# Patient Record
Sex: MALE | Race: WHITE | Employment: OTHER | URBAN - METROPOLITAN AREA
[De-identification: names, ages, dates, MRNs, and addresses within clinical notes are randomized per-mention and may not be internally consistent; named-entity substitution may affect disease eponyms.]

---

## 2019-09-27 ENCOUNTER — OFFICE VISIT (OUTPATIENT)
Dept: NEUROLOGY | Age: 69
End: 2019-09-27

## 2019-09-27 VITALS
RESPIRATION RATE: 12 BRPM | DIASTOLIC BLOOD PRESSURE: 60 MMHG | OXYGEN SATURATION: 97 % | HEART RATE: 105 BPM | HEIGHT: 65 IN | SYSTOLIC BLOOD PRESSURE: 134 MMHG | WEIGHT: 177 LBS | BODY MASS INDEX: 29.49 KG/M2

## 2019-09-27 DIAGNOSIS — G70.00 OCULAR MYASTHENIA GRAVIS (HCC): Primary | ICD-10-CM

## 2019-09-27 DIAGNOSIS — H53.2 DIPLOPIA: ICD-10-CM

## 2019-09-27 RX ORDER — PYRIDOSTIGMINE BROMIDE 60 MG/1
TABLET ORAL
Qty: 135 TAB | Refills: 2 | Status: SHIPPED | OUTPATIENT
Start: 2019-09-27 | End: 2019-12-13 | Stop reason: SDUPTHER

## 2019-09-27 NOTE — PROGRESS NOTES
Chief Complaint   Patient presents with    Neurologic Problem     Double vision       Referred by: Dr. Hannah Cortez      HPI    Mr. Mahnaz Bruno is a 56-year-old gentleman with diabetes who is here referred by ophthalmology for double vision. I reviewed the medical records and he has been having intermittent double vision for about 6 months and left eye drooping. He has a history of prostate cancer in remission. Ophthalmology did a ice pack test with immediate reversal of symptoms consistent with myasthenia. Blood work ordered and is pending. MRI pending. He denies any shortness of breath or difficulty swallowing. No weakness in the extremities. 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 Review of Systems   Eyes: Positive for double vision. Respiratory: Negative for shortness of breath. Neurological: Negative for weakness. All other systems reviewed and are negative.       Past Medical History:   Diagnosis Date    Anxiety disorder     Cancer ([facility] [address]     PROSTATE; BCCA    Diabetes ([facility] [address]     GERD (gastroesophageal reflux disease)     Hypertension     Neurological disorder      Family History   Problem Relation Age of Onset    Cancer Brother         LUNG; THROAT    Anesth Problems Neg Hx      Social History     Socioeconomic History    Marital status:      Spouse name: Not on file    Number of children: Not on file    Years of education: Not on file    Highest education level: Not on file   Occupational History    Not on file   Social Needs    Financial resource strain: Not on file    Food insecurity:     Worry: Not on file     Inability: Not on file    Transportation needs:     Medical: Not on file     Non-medical: Not on file   Tobacco Use    Smoking status: Never Smoker    Smokeless tobacco: Never Used   Substance and Sexual Activity    Alcohol use: Yes     Comment: RARE    Drug use: No    Sexual activity: Yes     Partners: Female   Lifestyle    Physical activity:     Days per week: Not on file     Minutes per session: Not on file    Stress: Not on file   Relationships    Social connections:     Talks on phone: Not on file     Gets together: Not on file     Attends Anabaptism service: Not on file     Active member of club or organization: Not on file     Attends meetings of clubs or organizations: Not on file     Relationship status: Not on file    Intimate partner violence:     Fear of current or ex partner: Not on file     Emotionally abused: Not on file     Physically abused: Not on file     Forced sexual activity: Not on file   Other Topics Concern    Not on file   Social History Narrative    Not on file     Current Outpatient Medications   Medication Sig    pyridostigmine (MESTINON) 60 mg tablet 1/2 tab three times daily    omeprazole (PRILOSEC) 40 mg capsule Take 40 mg by mouth daily.  lisinopril (PRINIVIL, ZESTRIL) 20 mg tablet Take  by mouth daily.  atorvastatin (LIPITOR) 40 mg tablet Take  by mouth daily.  glipiZIDE (GLUCOTROL) 5 mg tablet Take  by mouth daily.  MULTIVITAMIN PO Take  by mouth daily.  HYDROcodone-acetaminophen (NORCO) 7.5-325 mg per tablet Take 1 Tab by mouth every six (6) hours as needed for Pain. Max Daily Amount: 4 Tabs.  ZOLPIDEM TARTRATE (AMBIEN PO) Take  by mouth as needed. No current facility-administered medications for this visit. Allergies   Allergen Reactions    Codeine Nausea Only         Neurologic Exam     Mental Status   Oriented to person, place, and time. Cranial Nerves   Cranial nerves II through XII intact. Left ptosis with decreased adduction left eye     Motor Exam   Muscle bulk: normal    Strength   Strength 5/5 throughout.      Sensory Exam   Light touch normal.     Gait, Coordination, and Reflexes     Gait  Gait: normal    Coordination   Romberg: negative  Finger to nose coordination: normal    Physical Exam   Constitutional: He is oriented to person, place, and time. He appears well-developed and well-nourished. Cardiovascular: Normal rate. Pulmonary/Chest: Effort normal.   Neurological: He is oriented to person, place, and time. He has normal strength. He has a normal Finger-Nose-Finger Test and a normal Romberg Test. Gait normal.   Skin: Skin is warm and dry. Psychiatric: His behavior is normal.   Vitals reviewed. Visit Vitals  /60   Pulse (!) 105   Resp 12   Ht 5' 5\" (1.651 m)   Wt 80.3 kg (177 lb)   SpO2 97%   BMI 29.45 kg/m²       Lab Results   Component Value Date/Time    WBC 17.1 (H) 03/10/2015 07:45 AM    HGB 10.6 (L) 03/11/2015 03:19 AM    HCT 31.9 (L) 03/11/2015 03:19 AM    PLATELET 197 96/13/0001 07:45 AM    MCV 90.5 03/10/2015 07:45 AM     Lab Results   Component Value Date/Time    Glucose 147 (H) 03/10/2015 07:45 AM    Glucose (POC) 129 (H) 03/11/2015 11:25 AM    Creatinine 1.20 (H) 03/10/2015 07:45 AM      No results found for: CHOL, CHOLPOCT, HDL, LDL, LDLC, LDLCPOC, LDLCEXT, TRIGL, TGLPOCT, CHHD, CHHDX  Lab Results   Component Value Date/Time    ALT (SGPT) 21 03/09/2015 01:38 PM    AST (SGOT) 13 (L) 03/09/2015 01:38 PM    Alk. phosphatase 43 (L) 03/09/2015 01:38 PM    Bilirubin, total 0.7 03/09/2015 01:38 PM    Albumin 3.5 03/10/2015 07:45 AM    Protein, total 6.0 (L) 03/09/2015 01:38 PM    INR 1.0 02/24/2015 10:59 AM    Prothrombin time 10.2 02/24/2015 10:59 AM    PLATELET 157 06/20/5412 07:45 AM        CT Results (maximum last 3): Results from [place] encounter on 01/23/15   CT ABD PELV W CONT    Narrative **Final Report**       ICD Codes / Adm. Diagnosis: 185   / Malignant neoplasm of prostate    Examination:  CT ABD PELVIS W CON  - [id number] - Jan 23 2015  2:45PM  Accession No:  [id number]  Reason:  Malignant neoplasm of prostate      REPORT:  INDICATION: Prostate cancer. COMPARISON: None.     TECHNIQUE:   Multislice helical CT was performed from the diaphragm to the symphysis   pubis during uneventful rapid bolus intravenous administration of 97 mL of   Isovue-370. . Oral contrast was also administered. Delayed images of the   abdomen were acquired. Contiguous 5 mm axial images were reconstructed and   lung and soft tissue windows were generated. Coronal reformations were   generated. FINDINGS:  The visualized portions of the lung bases are clear. There are no focal abnormalities within the spleen pancreas or adrenal   glands. There is a vague 4 mm low-density in the liver in segment 5 too   small to characterize but could represent tiny cyst. No renal masses are   identified. There is a 4 mm calcification lower pole left kidney. There is a   retroaortic left renal vein. There is an accessory right hepatic artery off of the SMA. .    The aorta tapers without aneurysm. There is no retroperitoneal adenopathy or   mass. The bowel is normal. The appendix is normal. There is no ascites or free   intraperitoneal air. There is no pelvic mass or adenopathy. IMPRESSION:   1. No evidence of metastatic disease. No adenopathy is identified. 2. 4 mm nonobstructing calculus lower pole left kidney. 3. There is a 4 mm low-density in segment 5 of the liver which is too small   to characterize but most likely a tiny cyst..              Signing/Reading Doctor: Portia Bello ([id number])    Approved: Portia Bello ([id number])  Jan 23 2015  3:44PM                                    MRI Results (maximum last 3): No results found for this or any previous visit. PET Results (maximum last 3): No results found for this or any previous visit. Assessment and Plan   Diagnoses and all orders for this visit:    1. Ocular myasthenia gravis ([facility] [address]  -     CT CHEST W WO CONT; Future    2. Diplopia    Other orders  -     pyridostigmine (MESTINON) 60 mg tablet; 1/2 tab three times daily      71-year-old gentleman with ocular myasthenia clinically who did respond to an ice pack test very appropriately. The remainder of his exam is benign. There may be some subtle right triceps weakness but nothing convincing to suggest generalized myasthenia today. We had a long conversation about the disease in general.  At this juncture because he is minimally symptomatic I will start Mestinon 30 mg 3 times daily for now to see if we can control the symptoms with this medication exclusively. He is diabetic and I would like to avoid steroids if possible but if he does start to show any generalization of symptoms such as weakness or shortness of breath or difficulty swallowing we are going to have to proceed with steroids immediately. I will await the MRI which is pending. CT chest ordered rule out thymoma. We will try to obtain the labs done last week. Questions answered in detail and I will see him at his next visit. I reviewed and decided to continue the current medications. This clinical note was dictated with an electronic dictation software that can make unintentional errors. If there are any questions, please contact me directly for clarification. A notice of this visit/encounter being completed has been sent electronically to the patient's PCP and/or referring provider.      [address] [place] [address], [facility] [address]  Diplomate ABPN

## 2019-09-27 NOTE — PATIENT INSTRUCTIONS
Computed Tomography (CT) Scan: About This Test 
What is it? A computed tomography (CT) scan uses X-rays to make detailed pictures of parts of your body and the structures inside your body. During the test, you will lie on a table that is attached to the [employer]. The CT scanner is a large doughnut-shaped machine. Why is this test done? Doctors use CT scans to study areas of the body, such as the brain, chest, or belly. CT scans are also used to assist or check on the success of a procedure or surgery. How can you prepare for the test? 
Talk to your doctor about all your health conditions before the test. For example, tell your doctor if: 
· You are or might be pregnant. · You are breastfeeding. · You have diabetes. · You take metformin. · You are allergic to any medicines. · You have had an X-ray test using barium contrast material in the past 4 days. · You get nervous in confined spaces. You may need medicine to help you relax. What happens before the test? 
· You may be asked to take off your jewelry. · You will take off all or most of your clothes and then change into a gown. · If you do leave some clothes on, make sure you take everything out of your pockets. · You may have contrast materials (dye) put into an IV in your arm. In some cases, you may have to drink a contrast material. Contrast material helps doctors see specific organs, blood vessels, and most tumors. What happens during the test? 
· You will lie on a table that is attached to the CT scanner. · The table slides into the round opening of the scanner. The table will move during the scan. The scanner moves within the doughnut-shaped casing around your body. · You will be asked to hold still during the scan. You may be asked to hold your breath for short periods.  
· You may be alone in the scanning room, but a technologist will be watching you through a window and talking with you during the test. 
 What else should you know about the test? 
· A CT scan does not hurt. · If a dye is used, you may feel a quick sting or pinch when the IV is started. The dye may make you feel warm and flushed and give you a metallic taste in your mouth. Some people feel sick to their stomach or get a headache. · If you breastfeed and are concerned about whether the dye used in this test is safe, talk to your doctor. Most experts believe that very little dye passes into breast milk and even less is passed on to the baby. But if you prefer, you can store some of your breast milk ahead of time and use it for a day or two after the test. 
· There is a small chance of getting cancer from some types of CT scans. The risk is higher in children, young adults, and people who have many radiation tests. If you are concerned about this risk, talk to your doctor about the benefits and risks of a CT scan and confirm that the test is needed. How long does the test take? The test will take about 30 to 60 minutes. Most of this time is spent getting ready for the scan. The actual test takes about 10 minutes. What happens after the test? 
· Depending on the reason for the test, you will probably be able to go home right away. · If contrast material was used, drink lots of liquids for 24 hours after the test unless your doctor tells you not to. Follow-up care is a key part of your treatment and safety. Be sure to make and go to all appointments, and call your doctor if you are having problems. It's also a good idea to keep a list of the medicines you take. Ask your doctor when you can expect to have your test results. Where can you learn more? Go to http://beatriz-lev.info/. Enter [id number] in the search box to learn more about \"Computed Tomography (CT) Scan: About This Test.\" Current as of: March 28, 2019 Content Version: 12.2 © 7369-4633 [employer].  Care instructions adapted under license by [address] (which disclaims liability or warranty for this information). If you have questions about a medical condition or this instruction, always ask your healthcare professional. [address] any warranty or liability for your use of this information.

## 2019-10-05 ENCOUNTER — HOSPITAL ENCOUNTER (OUTPATIENT)
Dept: MRI IMAGING | Age: 69
Discharge: HOME | End: 2019-10-05
Attending: OPHTHALMOLOGY
Payer: MEDICARE

## 2019-10-05 VITALS — WEIGHT: 170 LBS | BODY MASS INDEX: 28.29 KG/M2

## 2019-10-05 DIAGNOSIS — H53.2 DIPLOPIA: ICD-10-CM

## 2019-10-05 DIAGNOSIS — H02.402 UNSPECIFIED PTOSIS OF LEFT EYELID: ICD-10-CM

## 2019-10-05 PROCEDURE — 74011250636 HC RX REV CODE- 250/636

## 2019-10-05 PROCEDURE — A9575 INJ GADOTERATE MEGLUMI 0.1ML: HCPCS

## 2019-10-05 PROCEDURE — 70553 MRI BRAIN STEM W/O & W/DYE: CPT

## 2019-10-05 RX ORDER — GADOTERATE MEGLUMINE 376.9 MG/ML
15 INJECTION INTRAVENOUS
Status: COMPLETED | OUTPATIENT
Start: 2019-10-05 | End: 2019-10-05

## 2019-10-05 RX ADMIN — GADOTERATE MEGLUMINE 15 ML: 376.9 INJECTION INTRAVENOUS at 18:00

## 2019-10-07 ENCOUNTER — HOSPITAL ENCOUNTER (OUTPATIENT)
Dept: CT IMAGING | Age: 69
Discharge: HOME | End: 2019-10-07
Attending: PSYCHIATRY & NEUROLOGY
Payer: MEDICARE

## 2019-10-07 DIAGNOSIS — G70.00 OCULAR MYASTHENIA GRAVIS (HCC): ICD-10-CM

## 2019-10-07 LAB — CREAT BLD-MCNC: 0.9 MG/DL (ref 0.6–1.3)

## 2019-10-07 PROCEDURE — 82565 ASSAY OF CREATININE: CPT

## 2019-10-07 PROCEDURE — 74011636320 HC RX REV CODE- 636/320: Performed by: RADIOLOGY

## 2019-10-07 PROCEDURE — 71260 CT THORAX DX C+: CPT

## 2019-10-07 PROCEDURE — 74011000258 HC RX REV CODE- 258: Performed by: RADIOLOGY

## 2019-10-07 RX ORDER — SODIUM CHLORIDE 0.9 % (FLUSH) 0.9 %
10 SYRINGE (ML) INJECTION
Status: COMPLETED | OUTPATIENT
Start: 2019-10-07 | End: 2019-10-07

## 2019-10-07 RX ADMIN — SODIUM CHLORIDE 100 ML: 900 INJECTION, SOLUTION INTRAVENOUS at 14:22

## 2019-10-07 RX ADMIN — IOPAMIDOL 100 ML: 612 INJECTION, SOLUTION INTRAVENOUS at 14:22

## 2019-10-07 RX ADMIN — Medication 10 ML: at 14:22

## 2019-11-04 ENCOUNTER — TELEPHONE (OUTPATIENT)
Dept: NEUROLOGY | Age: 69
End: 2019-11-04

## 2019-11-04 NOTE — TELEPHONE ENCOUNTER
Pt's PCP. Dr. Martinez Friendly, calling to speak with Dr. Nadir Resendiz re pt. Pt is in the office now.  Please call back

## 2019-12-13 ENCOUNTER — OFFICE VISIT (OUTPATIENT)
Dept: NEUROLOGY | Age: 69
End: 2019-12-13

## 2019-12-13 VITALS
OXYGEN SATURATION: 95 % | SYSTOLIC BLOOD PRESSURE: 152 MMHG | HEIGHT: 65 IN | HEART RATE: 82 BPM | WEIGHT: 177 LBS | BODY MASS INDEX: 29.49 KG/M2 | RESPIRATION RATE: 14 BRPM | DIASTOLIC BLOOD PRESSURE: 80 MMHG

## 2019-12-13 DIAGNOSIS — G70.00 MYASTHENIA GRAVIS (HCC): Primary | ICD-10-CM

## 2019-12-13 RX ORDER — PREDNISONE 20 MG/1
40 TABLET ORAL
Qty: 180 TAB | Refills: 1 | Status: SHIPPED | OUTPATIENT
Start: 2019-12-13 | End: 2020-07-20

## 2019-12-13 RX ORDER — MYCOPHENOLATE MOFETIL 500 MG/1
500 TABLET ORAL 2 TIMES DAILY
Qty: 180 TAB | Refills: 1 | Status: SHIPPED | OUTPATIENT
Start: 2019-12-13 | End: 2020-12-07 | Stop reason: SDUPTHER

## 2019-12-13 RX ORDER — PYRIDOSTIGMINE BROMIDE 60 MG/1
TABLET ORAL
Qty: 135 TAB | Refills: 2 | Status: SHIPPED | OUTPATIENT
Start: 2019-12-13 | End: 2020-12-07 | Stop reason: SDUPTHER

## 2019-12-13 NOTE — PROGRESS NOTES
Chief Complaint   Patient presents with    Neurologic Problem     Myasthenia       HPI    Jeff Swan is a 56-year-old gentleman here to follow-up. He is antibody positive (B/B/B, anti-striatal positivity) myasthenia gravis here to follow-up. CT negative for thymoma. Since I saw him last he remains on Mestinon half tablet 3 times daily with minimal benefit short-lived. Intermittent GI symptoms. Today he is symptomatic with left ptosis and double vision. No weakness in extremities or shortness of breath. Review of Systems   Eyes: Positive for double vision. Respiratory: Negative for shortness of breath. Neurological: Negative for weakness. All other systems reviewed and are negative.       Past Medical History:   Diagnosis Date    Anxiety disorder     Cancer ([facility] [address]     PROSTATE; BCCA    Diabetes ([facility] [address]     GERD (gastroesophageal reflux disease)     Hypertension     Neurological disorder      Family History   Problem Relation Age of Onset    Cancer Brother         LUNG; THROAT    Anesth Problems Neg Hx      Social History     Socioeconomic History    Marital status:      Spouse name: Not on file    Number of children: Not on file    Years of education: Not on file    Highest education level: Not on file   Occupational History    Not on file   Social Needs    Financial resource strain: Not on file    Food insecurity:     Worry: Not on file     Inability: Not on file    Transportation needs:     Medical: Not on file     Non-medical: Not on file   Tobacco Use    Smoking status: Never Smoker    Smokeless tobacco: Never Used   Substance and Sexual Activity    Alcohol use: Yes     Comment: RARE    Drug use: No    Sexual activity: Yes     Partners: Female   Lifestyle    Physical activity:     Days per week: Not on file     Minutes per session: Not on file    Stress: Not on file   Relationships    Social connections:     Talks on phone: Not on file     Gets together: Not on file Attends Yazidism service: Not on file     Active member of club or organization: Not on file     Attends meetings of clubs or organizations: Not on file     Relationship status: Not on file    Intimate partner violence:     Fear of current or ex partner: Not on file     Emotionally abused: Not on file     Physically abused: Not on file     Forced sexual activity: Not on file   Other Topics Concern    Not on file   Social History Narrative    Not on file     Allergies   Allergen Reactions    Codeine Nausea Only         Current Outpatient Medications   Medication Sig    pyridostigmine (MESTINON) 60 mg tablet 1/2 tab three times daily    predniSONE (DELTASONE) 20 mg tablet Take 40 mg by mouth daily (with breakfast).  mycophenolate (CELLCEPT) 500 mg tablet Take 1 Tab by mouth two (2) times a day.  omeprazole (PRILOSEC) 40 mg capsule Take 40 mg by mouth daily.  lisinopril (PRINIVIL, ZESTRIL) 20 mg tablet Take  by mouth daily.  atorvastatin (LIPITOR) 40 mg tablet Take  by mouth daily.  glipiZIDE (GLUCOTROL) 5 mg tablet Take  by mouth daily.  MULTIVITAMIN PO Take  by mouth daily.  HYDROcodone-acetaminophen (NORCO) 7.5-325 mg per tablet Take 1 Tab by mouth every six (6) hours as needed for Pain. Max Daily Amount: 4 Tabs.  ZOLPIDEM TARTRATE (AMBIEN PO) Take  by mouth as needed. No current facility-administered medications for this visit. Neurologic Exam     Mental Status   WD/WN adult in NAD, normal grooming  VSS  A&O x 3    PERRL, nonicteric, left ptosis, right reduced adduction  Face is symmetric, tongue midline  Speech is fluent and clear  No limb ataxia. No abnl movements.   5/5 strength in the extremities  Moving all extemities spontaneously and symmetric  Normal gait    CVS RRR  Lungs nonlabored  Skin is warm and dry         Visit Vitals  /80   Pulse 82   Resp 14   Ht 5' 5\" (1.651 m)   Wt 80.3 kg (177 lb)   SpO2 95%   BMI 29.45 kg/m²       Assessment and Plan Diagnoses and all orders for this visit:    1. Myasthenia gravis (HCC)  -     mycophenolate (CELLCEPT) 500 mg tablet; Take 1 Tab by mouth two (2) times a day. Other orders  -     pyridostigmine (MESTINON) 60 mg tablet; 1/2 tab three times daily  -     predniSONE (DELTASONE) 20 mg tablet; Take 40 mg by mouth daily (with breakfast). 28-year-old gentleman with ocular myasthenia symptomatic currently despite Mestinon. We now have to add immune management and we will start with prednisone and bridge while we get CellCept approved and on board. He has no weakness in extremities which is reassuring. If he does develop any shortness of breath or weakness I need him to come to the emergency room. He is at risk for generalization of his condition. I will see him in 3 months or sooner if anything changes. We spoke about his disease in detail to include the diagnostic work-up and the current medications and plans moving forward. Careful management of his diabetes. I reviewed and decided to continue the current medications. This clinical note was dictated with an electronic dictation software that can make unintentional errors. If there are any questions, please contact me directly for clarification.       [address] [facility], [facility] [address]  Diplomate SUNITAN

## 2019-12-13 NOTE — LETTER
12/13/19 Patient: Roxanne Welch YOB: 1950 Date of Visit: 12/13/2019 Morris Torres MD 
1 [address] [address] VIA Facsimile: [phone] Dear Morris Torres MD, Thank you for referring Mr. Roxanne Welch to [address] [place] [address] for evaluation. My notes for this consultation are attached. If you have questions, please do not hesitate to call me. I look forward to following your patient along with you. Sincerely, [address] [place] [address], DO 
 
12/13/2019 Patient:  Roxanne Welch YOB: 1950 Date of Visit: 12/13/2019 Dear Morris Torres MD 
1 [address] [address] VIA Facsimile: [phone] Nicole James I was requested by Sosa Green MD to evaluate Mr. Roxanne Welch  for Chief Complaint Patient presents with  Neurologic Problem Myasthenia Devante Leader I am recommending the following:  
 
Diagnoses and all orders for this visit: 
 
1. Myasthenia gravis (HCC) 
-     mycophenolate (CELLCEPT) 500 mg tablet; Take 1 Tab by mouth two (2) times a day. Other orders -     pyridostigmine (MESTINON) 60 mg tablet; 1/2 tab three times daily -     predniSONE (DELTASONE) 20 mg tablet; Take 40 mg by mouth daily (with breakfast). ---------------------------------------------------------------------------------------------------------------------- Below is my encounter: Chief Complaint Patient presents with  Neurologic Problem Myasthenia KEYSHA Lopez is a 52-year-old gentleman here to follow-up. He is antibody positive (B/B/B, anti-striatal positivity) myasthenia gravis here to follow-up. CT negative for thymoma. Since I saw him last he remains on Mestinon half tablet 3 times daily with minimal benefit short-lived. Intermittent GI symptoms.   Today he is symptomatic with left ptosis and double vision. No weakness in extremities or shortness of breath. Review of Systems Eyes: Positive for double vision. Respiratory: Negative for shortness of breath. Neurological: Negative for weakness. All other systems reviewed and are negative. Past Medical History:  
Diagnosis Date  Anxiety disorder  Cancer ([facility] PROSTATE; BCCA  Diabetes ([facility]  GERD (gastroesophageal reflux disease)  Hypertension  Neurological disorder Family History Problem Relation Age of Onset  Cancer Brother LUNG; THROAT  Anesth Problems Neg Hx Social History Socioeconomic History  Marital status:  Spouse name: Not on file  Number of children: Not on file  Years of education: Not on file  Highest education level: Not on file Occupational History  Not on file Social Needs  Financial resource strain: Not on file  Food insecurity:  
  Worry: Not on file Inability: Not on file  Transportation needs:  
  Medical: Not on file Non-medical: Not on file Tobacco Use  Smoking status: Never Smoker  Smokeless tobacco: Never Used Substance and Sexual Activity  Alcohol use: Yes Comment: RARE  Drug use: No  
 Sexual activity: Yes  
  Partners: Female Lifestyle  Physical activity:  
  Days per week: Not on file Minutes per session: Not on file  Stress: Not on file Relationships  Social connections:  
  Talks on phone: Not on file Gets together: Not on file Attends Jewish service: Not on file Active member of club or organization: Not on file Attends meetings of clubs or organizations: Not on file Relationship status: Not on file  Intimate partner violence:  
  Fear of current or ex partner: Not on file Emotionally abused: Not on file Physically abused: Not on file Forced sexual activity: Not on file Other Topics Concern  Not on file Social History Narrative  Not on file Allergies Allergen Reactions  Codeine Nausea Only Current Outpatient Medications Medication Sig  pyridostigmine (MESTINON) 60 mg tablet 1/2 tab three times daily  predniSONE (DELTASONE) 20 mg tablet Take 40 mg by mouth daily (with breakfast).  mycophenolate (CELLCEPT) 500 mg tablet Take 1 Tab by mouth two (2) times a day.  omeprazole (PRILOSEC) 40 mg capsule Take 40 mg by mouth daily.  lisinopril (PRINIVIL, ZESTRIL) 20 mg tablet Take  by mouth daily.  atorvastatin (LIPITOR) 40 mg tablet Take  by mouth daily.  glipiZIDE (GLUCOTROL) 5 mg tablet Take  by mouth daily.  MULTIVITAMIN PO Take  by mouth daily.  HYDROcodone-acetaminophen (NORCO) 7.5-325 mg per tablet Take 1 Tab by mouth every six (6) hours as needed for Pain. Max Daily Amount: 4 Tabs.  ZOLPIDEM TARTRATE (AMBIEN PO) Take  by mouth as needed. No current facility-administered medications for this visit. Neurologic Exam  
 
Mental Status WD/WN adult in NAD, normal grooming VSS 
A&O x 3 PERRL, nonicteric, left ptosis, right reduced adduction Face is symmetric, tongue midline Speech is fluent and clear No limb ataxia. No abnl movements. 5/5 strength in the extremities Moving all extemities spontaneously and symmetric Normal gait CVS RRR Lungs nonlabored Skin is warm and dry Visit Vitals /80 Pulse 82 Resp 14 Ht 5' 5\" (1.651 m) Wt 80.3 kg (177 lb) SpO2 95% BMI 29.45 kg/m² Assessment and Plan Diagnoses and all orders for this visit: 
 
1. Myasthenia gravis (HCC) 
-     mycophenolate (CELLCEPT) 500 mg tablet; Take 1 Tab by mouth two (2) times a day. Other orders -     pyridostigmine (MESTINON) 60 mg tablet; 1/2 tab three times daily -     predniSONE (DELTASONE) 20 mg tablet; Take 40 mg by mouth daily (with breakfast).  
 
 
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 We now have to add immune management and we will start with prednisone and bridge while we get CellCept approved and on board. He has no weakness in extremities which is reassuring. If he does develop any shortness of breath or weakness I need him to come to the emergency room. He is at risk for generalization of his condition. I will see him in 3 months or sooner if anything changes. We spoke about his disease in detail to include the diagnostic work-up and the current medications and plans moving forward. Careful management of his diabetes. I reviewed and decided to continue the current medications. This clinical note was dictated with an electronic dictation software that can make unintentional errors. If there are any questions, please contact me directly for clarification. Thank you for giving me the opportunity to assist in the care of Mr. Jessica Barron. If you have questions, please do not hesitate to contact me. Sincerely, 2 [facility], DO Neurologist 
Brain Injury Medicine Diplomate STEFANIA

## 2020-07-06 ENCOUNTER — TELEPHONE (OUTPATIENT)
Dept: NEUROLOGY | Age: 70
End: 2020-07-06

## 2020-07-06 NOTE — TELEPHONE ENCOUNTER
----- Message from Jacky Dada Rhoda sent at 7/6/2020 12:58 PM EDT -----  Regarding: dr anderson/ refill  Medication Refill    Caller (if not patient): Jackson Huerta      Relationship of caller (if not patient): spouse      Best contact number(s): [phone]      Name of medication and dosage if known: prednisone 20mg tablets      Is patient out of this medication (yes/no): yes      Pharmacy name: [facility]     Pharmacy listed in chart? (yes/no): yes  Pharmacy phone number: [phone]      Details to clarify the request:      Jacky LuxHellen

## 2020-07-07 NOTE — TELEPHONE ENCOUNTER
Yes, he should remain on that medication until he is seen in the office. If he would like a sooner appointment he can see the nurse practitioner in advance of September.

## 2020-07-20 ENCOUNTER — OFFICE VISIT (OUTPATIENT)
Dept: URGENT CARE | Age: 70
End: 2020-07-20

## 2020-07-20 VITALS — OXYGEN SATURATION: 94 % | RESPIRATION RATE: 20 BRPM | HEART RATE: 78 BPM | TEMPERATURE: 98.3 F

## 2020-07-20 DIAGNOSIS — Z20.822 SUSPECTED COVID-19 VIRUS INFECTION: Primary | ICD-10-CM

## 2020-07-20 RX ORDER — PREDNISONE 20 MG/1
TABLET ORAL
Qty: 180 TAB | Refills: 1 | Status: SHIPPED | OUTPATIENT
Start: 2020-07-20 | End: 2020-12-07 | Stop reason: SDUPTHER

## 2020-07-20 NOTE — PROGRESS NOTES
This patient was seen in [facility] at [address] [place] [address] Urgent Care while in their vehicle due to COVID-19 pandemic with PPE and focused examination in order to decrease community viral transmission. The patient/guardian gave verbal consent to treat. The history is provided by the patient.       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    Past Medical History:   Diagnosis Date    Anxiety disorder     Cancer ([facility]     PROSTATE; BCCA    Diabetes ([facility]     GERD (gastroesophageal reflux disease)     Hypertension     Neurological disorder         Past Surgical History:   Procedure Laterality Date    HX OTHER SURGICAL      Horse Accident-surgery to face for fx    HX OTHER SURGICAL Right     Ear Surgery post facial surgery for scar tissue removal    HX UROLOGICAL           Family History   Problem Relation Age of Onset    Cancer Brother         LUNG; THROAT    Anesth Problems Neg Hx         Social History     Socioeconomic History    Marital status:      Spouse name: Not on file    Number of children: Not on file    Years of education: Not on file    Highest education level: Not on file   Occupational History    Not on file   Social Needs    Financial resource strain: Not on file    Food insecurity     Worry: Not on file     Inability: Not on file    Transportation needs     Medical: Not on file     Non-medical: Not on file   Tobacco Use    Smoking status: Never Smoker    Smokeless tobacco: Never Used   Substance and Sexual Activity    Alcohol use: Yes     Comment: RARE    Drug use: No    Sexual activity: Yes     Partners: Female   Lifestyle    Physical activity     Days per week: Not on file     Minutes per session: Not on file    Stress: Not on file   Relationships    Social connections     Talks on phone: Not on file     Gets together: Not on file     Attends Mormon service: Not on file     Active member of club or organization: Not on file Attends meetings of clubs or organizations: Not on file     Relationship status: Not on file    Intimate partner violence     Fear of current or ex partner: Not on file     Emotionally abused: Not on file     Physically abused: Not on file     Forced sexual activity: Not on file   Other Topics Concern    Not on file   Social History Narrative    Not on file                ALLERGIES: Codeine    Review of Systems   All other systems reviewed and are negative. Vitals:    07/20/20 1356   Pulse: 78   Resp: 20   Temp: 98.3 °F (36.8 °C)   SpO2: 94%       Physical Exam  Vitals signs and nursing note reviewed. Constitutional:       General: He is not in acute distress. Appearance: He is not ill-appearing. Pulmonary:      Effort: Pulmonary effort is normal. No respiratory distress. Breath sounds: Normal breath sounds. MDM    Procedures      ICD-10-CM ICD-9-CM    1. Suspected COVID-19 virus infection  Z20.828 V01.79 NOVEL CORONAVIRUS (COVID-19)      Tested for Covid-19 and advised to quarantine until result comes back. Use Mucinex DM twice a day    No orders of the defined types were placed in this encounter. No results found for any visits on 07/20/20. The patients condition was discussed with the patient and they understand. The patient is to follow up with primary care doctor. If signs and symptoms become worse the pt is to go to the ER. The patient is to take medications as prescribed.

## 2020-07-24 LAB — SARS-COV-2, NAA: NOT DETECTED

## 2020-10-09 ENCOUNTER — TRANSCRIBE ORDER (OUTPATIENT)
Dept: GENERAL RADIOLOGY | Age: 70
End: 2020-10-09

## 2020-10-09 ENCOUNTER — HOSPITAL ENCOUNTER (OUTPATIENT)
Dept: GENERAL RADIOLOGY | Age: 70
Discharge: HOME | End: 2020-10-09
Payer: MEDICARE

## 2020-10-09 DIAGNOSIS — M54.2 NECK PAIN: Primary | ICD-10-CM

## 2020-10-09 DIAGNOSIS — M25.512 LEFT ANTERIOR SHOULDER PAIN: ICD-10-CM

## 2020-10-09 DIAGNOSIS — M54.2 NECK PAIN: ICD-10-CM

## 2020-10-09 DIAGNOSIS — M54.50 ACUTE RIGHT-SIDED LOW BACK PAIN WITHOUT SCIATICA: ICD-10-CM

## 2020-10-09 DIAGNOSIS — M25.512 LEFT ANTERIOR SHOULDER PAIN: Primary | ICD-10-CM

## 2020-10-09 PROCEDURE — 73030 X-RAY EXAM OF SHOULDER: CPT | Performed by: NURSE PRACTITIONER

## 2020-10-09 PROCEDURE — 72100 X-RAY EXAM L-S SPINE 2/3 VWS: CPT | Performed by: NURSE PRACTITIONER

## 2020-10-09 PROCEDURE — 72050 X-RAY EXAM NECK SPINE 4/5VWS: CPT | Performed by: NURSE PRACTITIONER

## 2020-12-07 ENCOUNTER — OFFICE VISIT (OUTPATIENT)
Dept: NEUROLOGY | Age: 70
End: 2020-12-07
Payer: MEDICARE

## 2020-12-07 VITALS
HEART RATE: 88 BPM | BODY MASS INDEX: 29.16 KG/M2 | WEIGHT: 175 LBS | SYSTOLIC BLOOD PRESSURE: 132 MMHG | HEIGHT: 65 IN | DIASTOLIC BLOOD PRESSURE: 84 MMHG | OXYGEN SATURATION: 98 %

## 2020-12-07 DIAGNOSIS — G70.00 MYASTHENIA GRAVIS (HCC): ICD-10-CM

## 2020-12-07 DIAGNOSIS — G70.00 OCULAR MYASTHENIA GRAVIS (HCC): Primary | ICD-10-CM

## 2020-12-07 PROCEDURE — 3017F COLORECTAL CA SCREEN DOC REV: CPT | Performed by: PSYCHIATRY & NEUROLOGY

## 2020-12-07 PROCEDURE — 1101F PT FALLS ASSESS-DOCD LE1/YR: CPT | Performed by: PSYCHIATRY & NEUROLOGY

## 2020-12-07 PROCEDURE — G8510 SCR DEP NEG, NO PLAN REQD: HCPCS | Performed by: PSYCHIATRY & NEUROLOGY

## 2020-12-07 PROCEDURE — G8427 DOCREV CUR MEDS BY ELIG CLIN: HCPCS | Performed by: PSYCHIATRY & NEUROLOGY

## 2020-12-07 PROCEDURE — G8419 CALC BMI OUT NRM PARAM NOF/U: HCPCS | Performed by: PSYCHIATRY & NEUROLOGY

## 2020-12-07 PROCEDURE — 99214 OFFICE O/P EST MOD 30 MIN: CPT | Performed by: PSYCHIATRY & NEUROLOGY

## 2020-12-07 PROCEDURE — G8536 NO DOC ELDER MAL SCRN: HCPCS | Performed by: PSYCHIATRY & NEUROLOGY

## 2020-12-07 RX ORDER — MYCOPHENOLATE MOFETIL 500 MG/1
500 TABLET ORAL 2 TIMES DAILY
Qty: 180 TAB | Refills: 2 | Status: SHIPPED | OUTPATIENT
Start: 2020-12-07 | End: 2021-04-21 | Stop reason: SDUPTHER

## 2020-12-07 RX ORDER — PREDNISONE 20 MG/1
TABLET ORAL
Qty: 180 TAB | Refills: 1 | Status: SHIPPED | OUTPATIENT
Start: 2020-12-07 | End: 2021-04-21

## 2020-12-07 RX ORDER — PYRIDOSTIGMINE BROMIDE 60 MG/1
TABLET ORAL
Qty: 135 TAB | Refills: 2 | Status: SHIPPED | OUTPATIENT
Start: 2020-12-07 | End: 2021-04-21 | Stop reason: SDUPTHER

## 2020-12-07 NOTE — PROGRESS NOTES
Chief Complaint   Patient presents with    Neurologic Problem     Myasthenia Gravis, follow up     Visit Vitals  /84 (BP 1 Location: Left arm, BP Patient Position: Sitting)   Pulse 88   Ht 5' 5\" (1.651 m)   Wt 175 lb (79.4 kg)   SpO2 98%   BMI 29.12 kg/m²

## 2020-12-07 NOTE — PROGRESS NOTES
Chief Complaint   Patient presents with    Neurologic Problem     Myasthenia Gravis, follow up       HPI        Rosie Quiñones is a 80-year-old gentleman here to follow-up. He is antibody positive (B/B/B, anti-striatal positivity) myasthenia gravis here to follow-up. CT negative for thymoma. Since I saw him last he remains on Mestinon half tablet 3 times daily with minimal benefit short-lived. Intermittent GI symptoms. Additionally he has run out of his medication both prednisone and CellCept and has become symptomatic. Left eye ptosis with diplopia. No shortness of breath or weakness. Review of Systems   Eyes: Positive for double vision. Respiratory: Negative for shortness of breath. All other systems reviewed and are negative.       Past Medical History:   Diagnosis Date    Anxiety disorder     Cancer ([facility]     PROSTATE; BCCA    Diabetes ([facility]     GERD (gastroesophageal reflux disease)     Hypertension     Neurological disorder      Family History   Problem Relation Age of Onset    Cancer Brother         LUNG; THROAT    Anesth Problems Neg Hx      Social History     Socioeconomic History    Marital status:      Spouse name: Not on file    Number of children: Not on file    Years of education: Not on file    Highest education level: Not on file   Occupational History    Not on file   Social Needs    Financial resource strain: Not on file    Food insecurity     Worry: Not on file     Inability: Not on file   [employer] needs     Medical: Not on file     Non-medical: Not on file   Tobacco Use    Smoking status: Never Smoker    Smokeless tobacco: Never Used   Substance and Sexual Activity    Alcohol use: Yes     Comment: RARE    Drug use: No    Sexual activity: Yes     Partners: Female   Lifestyle    Physical activity     Days per week: Not on file     Minutes per session: Not on file    Stress: Not on file   Relationships    Social connections     Talks on phone: Not on file     Gets together: Not on file     Attends Lutheran service: Not on file     Active member of club or organization: Not on file     Attends meetings of clubs or organizations: Not on file     Relationship status: Not on file    Intimate partner violence     Fear of current or ex partner: Not on file     Emotionally abused: Not on file     Physically abused: Not on file     Forced sexual activity: Not on file   Other Topics Concern    Not on file   Social History Narrative    Not on file     Allergies   Allergen Reactions    Codeine Nausea Only         Current Outpatient Medications   Medication Sig    mycophenolate (CELLCEPT) 500 mg tablet Take 1 Tab by mouth two (2) times a day.  predniSONE (DELTASONE) 20 mg tablet 2 tablets daily for 90 days then reduce to 1 tablet daily and continue    pyridostigmine (MESTINON) 60 mg tablet 1/2 tab three times daily    omeprazole (PRILOSEC) 40 mg capsule Take 40 mg by mouth daily.  lisinopril (PRINIVIL, ZESTRIL) 20 mg tablet Take  by mouth daily.  atorvastatin (LIPITOR) 40 mg tablet Take  by mouth daily.  glipiZIDE (GLUCOTROL) 5 mg tablet Take  by mouth daily.  MULTIVITAMIN PO Take  by mouth daily.  HYDROcodone-acetaminophen (NORCO) 7.5-325 mg per tablet Take 1 Tab by mouth every six (6) hours as needed for Pain. Max Daily Amount: 4 Tabs.  ZOLPIDEM TARTRATE (AMBIEN PO) Take  by mouth as needed. No current facility-administered medications for this visit. Neurologic Exam     Mental Status   WD/WN adult in NAD, normal grooming  VSS  A&O x 3    PERRL, nonicteric, EOMI, left ptosis fluctuating during exam  Face is symmetric, tongue midline  Speech is fluent and clear  No limb ataxia. No abnl movements.   Moving all extemities spontaneously and symmetric  Normal gait    CVS RRR  Lungs nonlabored  Skin is warm and dry         Visit Vitals  /84 (BP 1 Location: Left arm, BP Patient Position: Sitting)   Pulse 88   Ht 5' 5\" (1.651 m) Wt 175 lb (79.4 kg)   SpO2 98%   BMI 29.12 kg/m²       Assessment and Plan   Diagnoses and all orders for this visit:    1. Ocular myasthenia gravis ([address]    2. Myasthenia gravis (HCC)  -     mycophenolate (CELLCEPT) 500 mg tablet; Take 1 Tab by mouth two (2) times a day. Other orders  -     predniSONE (DELTASONE) 20 mg tablet; 2 tablets daily for 90 days then reduce to 1 tablet daily and continue  -     pyridostigmine (MESTINON) 60 mg tablet; 1/2 tab three times daily      72year-old gentleman with myasthenia symptomatic today since he has been off of prednisone and CellCept. We will resume today 40 mg daily for 90 days then reduce to 20 mg daily and continue. He will start CellCept concomitantly. Continue Mestinon. ER precautions discussed, if he has any shortness of breath difficulty swallowing or weakness please come to the hospital immediately. I will see him in about 3-4 months. I reviewed and decided to continue the current medications. This clinical note was dictated with an electronic dictation software that can make unintentional errors. If there are any questions, please contact me directly for clarification.       2 [facility], [facility] [address]  Diplomate SUNITAN

## 2020-12-07 NOTE — LETTER
12/7/20 Patient: Tete Galaviz YOB: 1950 Date of Visit: 12/7/2020 Rebel Mazariegos MD 
1 [address] [address] VIA Facsimile: [phone] Dear Rebel Mazariegos MD, Thank you for referring Mr. Tete Galaviz to [address] [place] [address] for evaluation. My notes for this consultation are attached. If you have questions, please do not hesitate to call me. I look forward to following your patient along with you. Sincerely, 2 [facility], DO 
 
12/7/2020 Patient:  Tete Galaviz YOB: 1950 Date of Visit: 12/7/2020 Dear Rebel Mazariegos MD 
1 [address] [address] VIA Facsimile: [phone]: I was requested by Kaleb Garces MD to evaluate Mr. Tete Galaviz  for Chief Complaint Patient presents with  Neurologic Problem Myasthenia Gravis, follow up Dangelo Fong I am recommending the following:  
 
Diagnoses and all orders for this visit: 
 
1. Ocular myasthenia gravis ([address] 2. Myasthenia gravis (HCC) 
-     mycophenolate (CELLCEPT) 500 mg tablet; Take 1 Tab by mouth two (2) times a day. Other orders -     predniSONE (DELTASONE) 20 mg tablet; 2 tablets daily for 90 days then reduce to 1 tablet daily and continue -     pyridostigmine (MESTINON) 60 mg tablet; 1/2 tab three times daily 
 
 
 
---------------------------------------------------------------------------------------------------------------------- Below is my encounter: Chief Complaint Patient presents with  Neurologic Problem Myasthenia Gravis, follow up KEYSHA Qamar Lambert is a 27-year-old gentleman here to follow-up. He is antibody positive (B/B/B, anti-striatal positivity) myasthenia gravis here to follow-up. CT negative for thymoma. Since I saw him last he remains on Mestinon half tablet 3 times daily with minimal benefit short-lived. Intermittent GI symptoms. Additionally he has run out of his medication both prednisone and CellCept and has become symptomatic. Left eye ptosis with diplopia. No shortness of breath or weakness. Review of Systems Eyes: Positive for double vision. Respiratory: Negative for shortness of breath. All other systems reviewed and are negative. Past Medical History:  
Diagnosis Date  Anxiety disorder  [facility] PROSTATE; BCCA  Diabetes ([facility]  GERD (gastroesophageal reflux disease)  Hypertension  Neurological disorder Family History Problem Relation Age of Onset  Cancer Brother LUNG; THROAT  Anesth Problems Neg Hx Social History Socioeconomic History  Marital status:  Spouse name: Not on file  Number of children: Not on file  Years of education: Not on file  Highest education level: Not on file Occupational History  Not on file Social Needs  Financial resource strain: Not on file  Food insecurity Worry: Not on file Inability: Not on file  Transportation needs Medical: Not on file Non-medical: Not on file Tobacco Use  Smoking status: Never Smoker  Smokeless tobacco: Never Used Substance and Sexual Activity  Alcohol use: Yes Comment: RARE  Drug use: No  
 Sexual activity: Yes  
  Partners: Female Lifestyle  Physical activity Days per week: Not on file Minutes per session: Not on file  Stress: Not on file Relationships  Social connections Talks on phone: Not on file Gets together: Not on file Attends Orthodoxy service: Not on file Active member of club or organization: Not on file Attends meetings of clubs or organizations: Not on file Relationship status: Not on file  Intimate partner violence Fear of current or ex partner: Not on file Emotionally abused: Not on file Physically abused: Not on file Forced sexual activity: Not on file Other Topics Concern  Not on file Social History Narrative  Not on file Allergies Allergen Reactions  Codeine Nausea Only Current Outpatient Medications Medication Sig  
 mycophenolate (CELLCEPT) 500 mg tablet Take 1 Tab by mouth two (2) times a day.  predniSONE (DELTASONE) 20 mg tablet 2 tablets daily for 90 days then reduce to 1 tablet daily and continue  pyridostigmine (MESTINON) 60 mg tablet 1/2 tab three times daily  omeprazole (PRILOSEC) 40 mg capsule Take 40 mg by mouth daily.  lisinopril (PRINIVIL, ZESTRIL) 20 mg tablet Take  by mouth daily.  atorvastatin (LIPITOR) 40 mg tablet Take  by mouth daily.  glipiZIDE (GLUCOTROL) 5 mg tablet Take  by mouth daily.  MULTIVITAMIN PO Take  by mouth daily.  HYDROcodone-acetaminophen (NORCO) 7.5-325 mg per tablet Take 1 Tab by mouth every six (6) hours as needed for Pain. Max Daily Amount: 4 Tabs.  ZOLPIDEM TARTRATE (AMBIEN PO) Take  by mouth as needed. No current facility-administered medications for this visit. Neurologic Exam  
 
Mental Status WD/WN adult in NAD, normal grooming VSS 
A&O x 3 PERRL, nonicteric, EOMI, left ptosis fluctuating during exam 
Face is symmetric, tongue midline Speech is fluent and clear No limb ataxia. No abnl movements. Moving all extemities spontaneously and symmetric Normal gait CVS RRR Lungs nonlabored Skin is warm and dry Visit Vitals /84 (BP 1 Location: Left arm, BP Patient Position: Sitting) Pulse 88 Ht 5' 5\" (1.651 m) Wt 175 lb (79.4 kg) SpO2 98% BMI 29.12 kg/m² Assessment and Plan Diagnoses and all orders for this visit: 
 
1. Ocular myasthenia gravis ([facility] 2. Myasthenia gravis (HCC) 
-     mycophenolate (CELLCEPT) 500 mg tablet; Take 1 Tab by mouth two (2) times a day. Other orders -     predniSONE (DELTASONE) 20 mg tablet; 2 tablets daily for 90 days then reduce to 1 tablet daily and continue -     pyridostigmine (MESTINON) 60 mg tablet; 1/2 tab three times daily 41-year-old gentleman with myasthenia symptomatic today since he has been off of prednisone and CellCept. We will resume today 40 mg daily for 90 days then reduce to 20 mg daily and continue. He will start CellCept concomitantly. Continue Mestinon. ER precautions discussed, if he has any shortness of breath difficulty swallowing or weakness please come to the hospital immediately. I will see him in about 3-4 months. I reviewed and decided to continue the current medications. This clinical note was dictated with an electronic dictation software that can make unintentional errors. If there are any questions, please contact me directly for clarification. Thank you for giving me the opportunity to assist in the care of Mr. Jean Pierre De Leon. If you have questions, please do not hesitate to contact me. Sincerely, [address] [facility], [facility], DO Neurologist 
Brain Injury Medicine Diplomate STEFANIA

## 2021-04-21 ENCOUNTER — OFFICE VISIT (OUTPATIENT)
Dept: NEUROLOGY | Age: 71
End: 2021-04-21
Payer: MEDICARE

## 2021-04-21 VITALS
HEART RATE: 76 BPM | OXYGEN SATURATION: 98 % | SYSTOLIC BLOOD PRESSURE: 154 MMHG | DIASTOLIC BLOOD PRESSURE: 84 MMHG | RESPIRATION RATE: 18 BRPM

## 2021-04-21 DIAGNOSIS — G70.00 MYASTHENIA GRAVIS (HCC): ICD-10-CM

## 2021-04-21 DIAGNOSIS — G70.00 OCULAR MYASTHENIA GRAVIS (HCC): Primary | ICD-10-CM

## 2021-04-21 PROCEDURE — 99214 OFFICE O/P EST MOD 30 MIN: CPT | Performed by: PSYCHIATRY & NEUROLOGY

## 2021-04-21 PROCEDURE — G8536 NO DOC ELDER MAL SCRN: HCPCS | Performed by: PSYCHIATRY & NEUROLOGY

## 2021-04-21 PROCEDURE — G8419 CALC BMI OUT NRM PARAM NOF/U: HCPCS | Performed by: PSYCHIATRY & NEUROLOGY

## 2021-04-21 PROCEDURE — 1101F PT FALLS ASSESS-DOCD LE1/YR: CPT | Performed by: PSYCHIATRY & NEUROLOGY

## 2021-04-21 PROCEDURE — G8432 DEP SCR NOT DOC, RNG: HCPCS | Performed by: PSYCHIATRY & NEUROLOGY

## 2021-04-21 PROCEDURE — 3017F COLORECTAL CA SCREEN DOC REV: CPT | Performed by: PSYCHIATRY & NEUROLOGY

## 2021-04-21 PROCEDURE — G8427 DOCREV CUR MEDS BY ELIG CLIN: HCPCS | Performed by: PSYCHIATRY & NEUROLOGY

## 2021-04-21 RX ORDER — ROSUVASTATIN CALCIUM 20 MG/1
20 TABLET, COATED ORAL
COMMUNITY
Start: 2020-12-02

## 2021-04-21 RX ORDER — TRAZODONE HYDROCHLORIDE 50 MG/1
50 TABLET ORAL
COMMUNITY
Start: 2020-12-01

## 2021-04-21 RX ORDER — MYCOPHENOLATE MOFETIL 500 MG/1
500 TABLET ORAL 2 TIMES DAILY
Qty: 180 TAB | Refills: 2 | Status: SHIPPED | OUTPATIENT
Start: 2021-04-21 | End: 2021-10-05

## 2021-04-21 RX ORDER — PREDNISONE 10 MG/1
10 TABLET ORAL DAILY
Qty: 90 TAB | Refills: 3 | Status: ON HOLD | OUTPATIENT
Start: 2021-04-21 | End: 2021-05-13

## 2021-04-21 RX ORDER — PYRIDOSTIGMINE BROMIDE 60 MG/1
TABLET ORAL
Qty: 135 TAB | Refills: 2 | Status: SHIPPED | OUTPATIENT
Start: 2021-04-21 | End: 2021-10-05 | Stop reason: SDUPTHER

## 2021-04-21 RX ORDER — ESCITALOPRAM OXALATE 20 MG/1
20 TABLET ORAL DAILY
COMMUNITY
Start: 2021-03-11 | End: 2021-05-24

## 2021-04-21 RX ORDER — LISINOPRIL AND HYDROCHLOROTHIAZIDE 20; 25 MG/1; MG/1
1 TABLET ORAL DAILY
COMMUNITY
Start: 2021-03-12 | End: 2021-05-24

## 2021-04-21 NOTE — PATIENT INSTRUCTIONS
PRESCRIPTION REFILL POLICY [facility] Statement to Patients April 1, 2014 In an effort to ensure the large volume of patient prescription refills is processed in the most efficient and expeditious manner, we are asking our patients to assist us by calling your Pharmacy for all prescription refills, this will include also your  Mail Order Pharmacy. The pharmacy will contact our office electronically to continue the refill process. Please do not wait until the last minute to call your pharmacy. We need at least 48 hours (2days) to fill prescriptions. We also encourage you to call your pharmacy before going to  your prescription to make sure it is ready. With regard to controlled substance prescription refill requests (narcotic refills) that need to be picked up at our office, we ask your cooperation by providing us with at least 72 hours (3days) notice that you will need a refill. We will not refill narcotic prescription refill requests after 4:00pm on any weekday, Monday through Thursday, or after 2:00pm on Fridays, or on the weekends. We encourage everyone to explore another way of getting your prescription refill request processed using [employer], our patient web portal through our electronic medical record system. [employer] is an efficient and effective way to communicate your medication request directly to the office and  downloadable as an trinidad on your smart phone . [employer] also features a review functionality that allows you to view your medication list as well as leave messages for your physician. Are you ready to get connected? If so please review the attatched instructions or speak to any of our staff to get you set up right away! Thank you so much for your cooperation. Should you have any questions please contact our Practice Administrator. The Physicians and Staff,  [facility]

## 2021-04-21 NOTE — PROGRESS NOTES
Chief Complaint   Patient presents with    Neurologic Problem     Myasthenia       HPI        Tabitha Azar is a 81-year-old gentleman here to follow-up. He has antibody positive (B/B/B, anti-striatal positivity) myasthenia gravis   CT negative for thymoma. Since I saw him last he actually has had some new developments. In late December or early January he contracted Covid resulting in Covid pneumonia. He was at a local hospital in the ICU but did not require ventilation. He was there for 2 weeks. He did receive IVIG while he was there. He was discharged and is still recovering. He has some scarring in the lung he tells me which is slowly getting better. No unusual shortness of breath or difficulty swallowing at this time. No diplopia. Having some left ptosis. He ran out of CellCept a couple weeks ago. He is taking prednisone 20 mg daily. He is using Mestinon only twice a day without needing midday dosing. During his hospitalization he lost about 20 pounds of weight which has been beneficial for him. Review of Systems   Eyes: Negative for double vision. Left ptosis   Respiratory: Negative for shortness of breath. Neurological: Positive for focal weakness. All other systems reviewed and are negative.       Past Medical History:   Diagnosis Date    Anxiety disorder     Cancer ([address]     PROSTATE; BCCA    Diabetes ([facility]     GERD (gastroesophageal reflux disease)     Hypertension     Neurological disorder      Family History   Problem Relation Age of Onset    Cancer Brother         LUNG; THROAT    Anesth Problems Neg Hx      Social History     Socioeconomic History    Marital status:      Spouse name: Not on file    Number of children: Not on file    Years of education: Not on file    Highest education level: Not on file   Occupational History    Not on file   Social Needs    Financial resource strain: Not on file    Food insecurity     Worry: Not on file     Inability: Not on file    Transportation needs     Medical: Not on file     Non-medical: Not on file   Tobacco Use    Smoking status: Never Smoker    Smokeless tobacco: Never Used   Substance and Sexual Activity    Alcohol use: Yes     Comment: RARE    Drug use: No    Sexual activity: Yes     Partners: Female   Lifestyle    Physical activity     Days per week: Not on file     Minutes per session: Not on file    Stress: Not on file   Relationships    Social connections     Talks on phone: Not on file     Gets together: Not on file     Attends Confucianism service: Not on file     Active member of club or organization: Not on file     Attends meetings of clubs or organizations: Not on file     Relationship status: Not on file    Intimate partner violence     Fear of current or ex partner: Not on file     Emotionally abused: Not on file     Physically abused: Not on file     Forced sexual activity: Not on file   Other Topics Concern    Not on file   Social History Narrative    Not on file     Allergies   Allergen Reactions    Codeine Nausea Only         Current Outpatient Medications   Medication Sig    escitalopram oxalate (LEXAPRO) 20 mg tablet TAKE 1 TABLET BY MOUTH EVERY DAY    rosuvastatin (CRESTOR) 20 mg tablet TK 1 T PO QD    lisinopril-hydroCHLOROthiazide (PRINZIDE, ZESTORETIC) 20-25 mg per tablet TAKE 1 TABLET BY MOUTH EVERY DAY    traZODone (DESYREL) 50 mg tablet TK 1 T PO QD HS PRN    mycophenolate (CELLCEPT) 500 mg tablet Take 1 Tab by mouth two (2) times a day.  predniSONE (DELTASONE) 10 mg tablet Take 10 mg by mouth daily.  pyridostigmine (MESTINON) 60 mg tablet 1/2 tab three times daily    ZOLPIDEM TARTRATE (AMBIEN PO) Take  by mouth as needed.  omeprazole (PRILOSEC) 40 mg capsule Take 40 mg by mouth daily.  lisinopril (PRINIVIL, ZESTRIL) 20 mg tablet Take  by mouth daily.  atorvastatin (LIPITOR) 40 mg tablet Take  by mouth daily.     glipiZIDE (GLUCOTROL) 5 mg tablet Take  by mouth daily.    MULTIVITAMIN PO Take  by mouth daily.  HYDROcodone-acetaminophen (NORCO) 7.5-325 mg per tablet Take 1 Tab by mouth every six (6) hours as needed for Pain. Max Daily Amount: 4 Tabs. No current facility-administered medications for this visit. Neurologic Exam     Mental Status   WD/WN adult in NAD, normal grooming  VSS  A&O x 3    PERRL, nonicteric, EOMI with left ptosis  Face is symmetric, tongue midline  Speech is fluent and clear  No limb ataxia. No abnl movements. Left upper extremity 4/5 giveaway secondary to shoulder pain otherwise other extremities are 5/5  Moving all extemities spontaneously and symmetric  Normal gait             Visit Vitals  BP (!) 154/84   Pulse 76   Resp 18   SpO2 98%       Assessment and Plan   Diagnoses and all orders for this visit:    1. Ocular myasthenia gravis ([facility]    2. Myasthenia gravis (HCC)  -     mycophenolate (CELLCEPT) 500 mg tablet; Take 1 Tab by mouth two (2) times a day. Other orders  -     predniSONE (DELTASONE) 10 mg tablet; Take 10 mg by mouth daily. -     pyridostigmine (MESTINON) 60 mg tablet; 1/2 tab three times daily      20-year-old gentleman who has antibody positive myasthenia gravis who sounds like he had an acute exacerbation subsequent to a Covid pneumonia at the end of 2020. Fortunately on exam today he does not have any weakness in extremities but he does have left ptosis. I want him to stay on steroids for now at the 20 mg dose until that bottle is completed after couple weeks which after he can then take 10 mg daily for maintenance. Concomitantly now I want him back on CellCept 500 twice a day and I placed the order. He needs to let me know before he runs out of medication so we will have a lapse in treatment. Stay on Mestinon 30 mg twice daily or 3 times daily. He does know his symptoms well.   If he has any difficulty breathing or unusual weakness he understands he needs to come to the hospital.  I believe he is high risk to have symptoms and we discussed ER precautions. I would like to see him in about 4 or 5 months. Call with any questions. 45 minutes of time was spent reviewing the medical record discussing the recent hospitalization and complications, educating on medication compliance and instructions, examination, documentation. I reviewed and decided to continue the current medications. This clinical note was dictated with an electronic dictation software that can make unintentional errors. If there are any questions, please contact me directly for clarification.       Claudio Mario, [address]  Diplomate ABPN

## 2021-04-21 NOTE — LETTER
4/21/2021 Patient: Laina Tsai YOB: 1950 Date of Visit: 4/21/2021 Juliane Hughes MD 
[address] [address] [address] Fax: [phone] Dear Juliane Hughes MD, Thank you for referring Mr. Laina Tsai to [address] [place] [address] for evaluation. My notes for this consultation are attached. If you have questions, please do not hesitate to call me. I look forward to following your patient along with you. Sincerely, [address] [place] [address], DO 
 
4/21/2021 Patient:  Laina Tsai YOB: 1950 Date of Visit: 4/21/2021 Dear Juliane Hughes MD 
[address] [address] [address] Fax: [phone]: I was requested by Jay Warren MD to evaluate Mr. Laina Tsai  for Chief Complaint Patient presents with  Neurologic Problem Myasthenia Ree Ware I am recommending the following:  
 
Diagnoses and all orders for this visit: 
 
1. Ocular myasthenia gravis ([address] 2. Myasthenia gravis (HCC) 
-     mycophenolate (CELLCEPT) 500 mg tablet; Take 1 Tab by mouth two (2) times a day. Other orders -     predniSONE (DELTASONE) 10 mg tablet; Take 10 mg by mouth daily. -     pyridostigmine (MESTINON) 60 mg tablet; 1/2 tab three times daily 
 
 
 
---------------------------------------------------------------------------------------------------------------------- Below is my encounter: Chief Complaint Patient presents with  Neurologic Problem Myasthenia KEYSHA Pierson is a 73-year-old gentleman here to follow-up. He has antibody positive (B/B/B, anti-striatal positivity) myasthenia gravis   CT negative for thymoma. Since I saw him last he actually has had some new developments. In late December or early January he contracted Covid resulting in Covid pneumonia. He was at a local hospital in the ICU but did not require ventilation. He was there for 2 weeks.   He did receive IVIG while he was there. He was discharged and is still recovering. He has some scarring in the lung he tells me which is slowly getting better. No unusual shortness of breath or difficulty swallowing at this time. No diplopia. Having some left ptosis. He ran out of CellCept a couple weeks ago. He is taking prednisone 20 mg daily. He is using Mestinon only twice a day without needing midday dosing. During his hospitalization he lost about 20 pounds of weight which has been beneficial for him. Review of Systems Eyes: Negative for double vision. Left ptosis Respiratory: Negative for shortness of breath. Neurological: Positive for focal weakness. All other systems reviewed and are negative. Past Medical History:  
Diagnosis Date  Anxiety disorder  Cancer ([facility] [address] PROSTATE; BCCA  Diabetes ([facility]  GERD (gastroesophageal reflux disease)  Hypertension  Neurological disorder Family History Problem Relation Age of Onset  Cancer Brother LUNG; THROAT  Anesth Problems Neg Hx Social History Socioeconomic History  Marital status:  Spouse name: Not on file  Number of children: Not on file  Years of education: Not on file  Highest education level: Not on file Occupational History  Not on file Social Needs  Financial resource strain: Not on file  Food insecurity Worry: Not on file Inability: Not on file  Transportation needs Medical: Not on file Non-medical: Not on file Tobacco Use  Smoking status: Never Smoker  Smokeless tobacco: Never Used Substance and Sexual Activity  Alcohol use: Yes Comment: RARE  Drug use: No  
 Sexual activity: Yes  
  Partners: Female Lifestyle  Physical activity Days per week: Not on file Minutes per session: Not on file  Stress: Not on file Relationships  Social connections Talks on phone: Not on file Gets together: Not on file Attends Alevism service: Not on file Active member of club or organization: Not on file Attends meetings of clubs or organizations: Not on file Relationship status: Not on file  Intimate partner violence Fear of current or ex partner: Not on file Emotionally abused: Not on file Physically abused: Not on file Forced sexual activity: Not on file Other Topics Concern  Not on file Social History Narrative  Not on file Allergies Allergen Reactions  Codeine Nausea Only Current Outpatient Medications Medication Sig  escitalopram oxalate (LEXAPRO) 20 mg tablet TAKE 1 TABLET BY MOUTH EVERY DAY  rosuvastatin (CRESTOR) 20 mg tablet TK 1 T PO QD  
 lisinopril-hydroCHLOROthiazide (PRINZIDE, ZESTORETIC) 20-25 mg per tablet TAKE 1 TABLET BY MOUTH EVERY DAY  traZODone (DESYREL) 50 mg tablet TK 1 T PO QD HS PRN  
 mycophenolate (CELLCEPT) 500 mg tablet Take 1 Tab by mouth two (2) times a day.  predniSONE (DELTASONE) 10 mg tablet Take 10 mg by mouth daily.  pyridostigmine (MESTINON) 60 mg tablet 1/2 tab three times daily  ZOLPIDEM TARTRATE (AMBIEN PO) Take  by mouth as needed.  omeprazole (PRILOSEC) 40 mg capsule Take 40 mg by mouth daily.  lisinopril (PRINIVIL, ZESTRIL) 20 mg tablet Take  by mouth daily.  atorvastatin (LIPITOR) 40 mg tablet Take  by mouth daily.  glipiZIDE (GLUCOTROL) 5 mg tablet Take  by mouth daily.  MULTIVITAMIN PO Take  by mouth daily.  HYDROcodone-acetaminophen (NORCO) 7.5-325 mg per tablet Take 1 Tab by mouth every six (6) hours as needed for Pain. Max Daily Amount: 4 Tabs. No current facility-administered medications for this visit. Neurologic Exam  
 
Mental Status WD/WN adult in NAD, normal grooming VSS 
A&O x 3 PERRL, nonicteric, EOMI with left ptosis Face is symmetric, tongue midline Speech is fluent and clear No limb ataxia. No abnl movements.  
Left upper extremity 4/5 giveaway secondary to shoulder pain otherwise other extremities are 5/5 Moving all extemities spontaneously and symmetric Normal gait Visit Vitals BP (!) 154/84 Pulse 76 Resp 18 SpO2 98% Assessment and Plan Diagnoses and all orders for this visit: 
 
1. Ocular myasthenia gravis ([address] 2. Myasthenia gravis (HCC) 
-     mycophenolate (CELLCEPT) 500 mg tablet; Take 1 Tab by mouth two (2) times a day. Other orders -     predniSONE (DELTASONE) 10 mg tablet; Take 10 mg by mouth daily. -     pyridostigmine (MESTINON) 60 mg tablet; 1/2 tab three times daily 81-year-old gentleman who has antibody positive myasthenia gravis who sounds like he had an acute exacerbation subsequent to a Covid pneumonia at the end of 2020. Fortunately on exam today he does not have any weakness in extremities but he does have left ptosis. I want him to stay on steroids for now at the 20 mg dose until that bottle is completed after couple weeks which after he can then take 10 mg daily for maintenance. Concomitantly now I want him back on CellCept 500 twice a day and I placed the order. He needs to let me know before he runs out of medication so we will have a lapse in treatment. Stay on Mestinon 30 mg twice daily or 3 times daily. He does know his symptoms well. If he has any difficulty breathing or unusual weakness he understands he needs to come to the hospital.  I believe he is high risk to have symptoms and we discussed ER precautions. I would like to see him in about 4 or 5 months. Call with any questions. 45 minutes of time was spent reviewing the medical record discussing the recent hospitalization and complications, educating on medication compliance and instructions, examination, documentation. I reviewed and decided to continue the current medications. This clinical note was dictated with an electronic dictation software that can make unintentional errors.   If there are any questions, please contact me directly for clarification. Thank you for giving me the opportunity to assist in the care of Mr. Karissa Wallace. If you have questions, please do not hesitate to contact me. Sincerely, [address] [facility], DO Neurologist 
Brain Injury Medicine Diplomate ABPN

## 2021-05-12 ENCOUNTER — APPOINTMENT (OUTPATIENT)
Dept: CT IMAGING | Age: 71
End: 2021-05-12
Attending: EMERGENCY MEDICINE
Payer: MEDICARE

## 2021-05-12 ENCOUNTER — HOSPITAL ENCOUNTER (INPATIENT)
Age: 71
LOS: 12 days | Discharge: HOME | DRG: 885 | End: 2021-05-24
Attending: PSYCHIATRY & NEUROLOGY | Admitting: PSYCHIATRY & NEUROLOGY
Payer: MEDICARE

## 2021-05-12 ENCOUNTER — HOSPITAL ENCOUNTER (EMERGENCY)
Age: 71
Discharge: OTHER INSTITUTION | End: 2021-05-12
Attending: EMERGENCY MEDICINE
Payer: MEDICARE

## 2021-05-12 VITALS
SYSTOLIC BLOOD PRESSURE: 142 MMHG | WEIGHT: 160 LBS | DIASTOLIC BLOOD PRESSURE: 100 MMHG | RESPIRATION RATE: 18 BRPM | OXYGEN SATURATION: 96 % | TEMPERATURE: 98.8 F | HEIGHT: 65 IN | HEART RATE: 94 BPM | BODY MASS INDEX: 26.66 KG/M2

## 2021-05-12 DIAGNOSIS — C61 MALIGNANT NEOPLASM OF PROSTATE (HCC): ICD-10-CM

## 2021-05-12 DIAGNOSIS — R41.82 ALTERED MENTAL STATUS, UNSPECIFIED ALTERED MENTAL STATUS TYPE: Primary | ICD-10-CM

## 2021-05-12 DIAGNOSIS — F30.9 MANIA (HCC): ICD-10-CM

## 2021-05-12 LAB
ALBUMIN SERPL-MCNC: 3.6 G/DL (ref 3.5–5)
ALBUMIN/GLOB SERPL: 1.1 {RATIO} (ref 1.1–2.2)
ALP SERPL-CCNC: 50 U/L (ref 45–117)
ALT SERPL-CCNC: 20 U/L (ref 12–78)
AMPHET UR QL SCN: NEGATIVE
ANION GAP SERPL CALC-SCNC: 6 MMOL/L (ref 5–15)
APAP SERPL-MCNC: <2 UG/ML (ref 10–30)
APPEARANCE UR: CLEAR
AST SERPL-CCNC: 12 U/L (ref 15–37)
BACTERIA URNS QL MICRO: NEGATIVE /HPF
BARBITURATES UR QL SCN: NEGATIVE
BASOPHILS # BLD: 0.1 K/UL (ref 0–0.1)
BASOPHILS NFR BLD: 1 % (ref 0–1)
BENZODIAZ UR QL: NEGATIVE
BILIRUB SERPL-MCNC: 0.4 MG/DL (ref 0.2–1)
BILIRUB UR QL: NEGATIVE
BUN SERPL-MCNC: 13 MG/DL (ref 6–20)
BUN/CREAT SERPL: 19 (ref 12–20)
CALCIUM SERPL-MCNC: 9.1 MG/DL (ref 8.5–10.1)
CANNABINOIDS UR QL SCN: NEGATIVE
CHLORIDE SERPL-SCNC: 105 MMOL/L (ref 97–108)
CO2 SERPL-SCNC: 25 MMOL/L (ref 21–32)
COCAINE UR QL SCN: NEGATIVE
COLOR UR: ABNORMAL
COVID-19 RAPID TEST, COVR: NOT DETECTED
CREAT SERPL-MCNC: 0.69 MG/DL (ref 0.7–1.3)
DIFFERENTIAL METHOD BLD: NORMAL
DRUG SCRN COMMENT,DRGCM: NORMAL
EOSINOPHIL # BLD: 0.3 K/UL (ref 0–0.4)
EOSINOPHIL NFR BLD: 3 % (ref 0–7)
EPITH CASTS URNS QL MICRO: ABNORMAL /LPF
ERYTHROCYTE [DISTWIDTH] IN BLOOD BY AUTOMATED COUNT: 12.5 % (ref 11.5–14.5)
ETHANOL SERPL-MCNC: <10 MG/DL
FLUAV RNA SPEC QL NAA+PROBE: NOT DETECTED
FLUBV RNA SPEC QL NAA+PROBE: NOT DETECTED
GLOBULIN SER CALC-MCNC: 3.3 G/DL (ref 2–4)
GLUCOSE SERPL-MCNC: 105 MG/DL (ref 65–100)
GLUCOSE UR STRIP.AUTO-MCNC: NEGATIVE MG/DL
HCT VFR BLD AUTO: 46.7 % (ref 36.6–50.3)
HGB BLD-MCNC: 15.6 G/DL (ref 12.1–17)
HGB UR QL STRIP: NEGATIVE
IMM GRANULOCYTES # BLD AUTO: 0 K/UL (ref 0–0.04)
IMM GRANULOCYTES NFR BLD AUTO: 0 % (ref 0–0.5)
KETONES UR QL STRIP.AUTO: 15 MG/DL
LEUKOCYTE ESTERASE UR QL STRIP.AUTO: NEGATIVE
LYMPHOCYTES # BLD: 1.3 K/UL (ref 0.8–3.5)
LYMPHOCYTES NFR BLD: 14 % (ref 12–49)
MCH RBC QN AUTO: 31.4 PG (ref 26–34)
MCHC RBC AUTO-ENTMCNC: 33.4 G/DL (ref 30–36.5)
MCV RBC AUTO: 94 FL (ref 80–99)
METHADONE UR QL: NEGATIVE
MONOCYTES # BLD: 1 K/UL (ref 0–1)
MONOCYTES NFR BLD: 10 % (ref 5–13)
MUCOUS THREADS URNS QL MICRO: ABNORMAL /LPF
NEUTS SEG # BLD: 7.1 K/UL (ref 1.8–8)
NEUTS SEG NFR BLD: 72 % (ref 32–75)
NITRITE UR QL STRIP.AUTO: NEGATIVE
NRBC # BLD: 0 K/UL (ref 0–0.01)
NRBC BLD-RTO: 0 PER 100 WBC
OPIATES UR QL: NEGATIVE
PCP UR QL: NEGATIVE
PH UR STRIP: 5.5 [PH] (ref 5–8)
PLATELET # BLD AUTO: 310 K/UL (ref 150–400)
PMV BLD AUTO: 9.6 FL (ref 8.9–12.9)
POTASSIUM SERPL-SCNC: 3.9 MMOL/L (ref 3.5–5.1)
PROT SERPL-MCNC: 6.9 G/DL (ref 6.4–8.2)
PROT UR STRIP-MCNC: NEGATIVE MG/DL
RBC # BLD AUTO: 4.97 M/UL (ref 4.1–5.7)
RBC #/AREA URNS HPF: ABNORMAL /HPF (ref 0–5)
SALICYLATES SERPL-MCNC: <1.7 MG/DL (ref 2.8–20)
SARS-COV-2, COV2: NORMAL
SARS-COV-2, COV2: NOT DETECTED
SODIUM SERPL-SCNC: 136 MMOL/L (ref 136–145)
SOURCE, COVRS: NORMAL
SP GR UR REFRACTOMETRY: 1.02 (ref 1–1.03)
UA: UC IF INDICATED,UAUC: ABNORMAL
UROBILINOGEN UR QL STRIP.AUTO: 1 EU/DL (ref 0.2–1)
WBC # BLD AUTO: 9.8 K/UL (ref 4.1–11.1)
WBC URNS QL MICRO: ABNORMAL /HPF (ref 0–4)

## 2021-05-12 PROCEDURE — 82077 ASSAY SPEC XCP UR&BREATH IA: CPT

## 2021-05-12 PROCEDURE — 36415 COLL VENOUS BLD VENIPUNCTURE: CPT

## 2021-05-12 PROCEDURE — 80053 COMPREHEN METABOLIC PANEL: CPT

## 2021-05-12 PROCEDURE — 85025 COMPLETE CBC W/AUTO DIFF WBC: CPT

## 2021-05-12 PROCEDURE — 99284 EMERGENCY DEPT VISIT MOD MDM: CPT

## 2021-05-12 PROCEDURE — 80179 DRUG ASSAY SALICYLATE: CPT

## 2021-05-12 PROCEDURE — 74011250637 HC RX REV CODE- 250/637: Performed by: NURSE PRACTITIONER

## 2021-05-12 PROCEDURE — 65220000001 HC RM PRIVATE PSYCH

## 2021-05-12 PROCEDURE — 80143 DRUG ASSAY ACETAMINOPHEN: CPT

## 2021-05-12 PROCEDURE — 87636 SARSCOV2 & INF A&B AMP PRB: CPT

## 2021-05-12 PROCEDURE — 80307 DRUG TEST PRSMV CHEM ANLYZR: CPT

## 2021-05-12 PROCEDURE — 81001 URINALYSIS AUTO W/SCOPE: CPT

## 2021-05-12 PROCEDURE — 70450 CT HEAD/BRAIN W/O DYE: CPT

## 2021-05-12 PROCEDURE — 87635 SARS-COV-2 COVID-19 AMP PRB: CPT

## 2021-05-12 RX ORDER — TRAZODONE HYDROCHLORIDE 50 MG/1
50 TABLET ORAL
Status: DISCONTINUED | OUTPATIENT
Start: 2021-05-12 | End: 2021-05-24 | Stop reason: HOSPADM

## 2021-05-12 RX ORDER — GLIPIZIDE 5 MG/1
5 TABLET ORAL
Status: DISCONTINUED | OUTPATIENT
Start: 2021-05-13 | End: 2021-05-13

## 2021-05-12 RX ORDER — ACETAMINOPHEN 325 MG/1
650 TABLET ORAL
Status: DISCONTINUED | OUTPATIENT
Start: 2021-05-12 | End: 2021-05-24 | Stop reason: HOSPADM

## 2021-05-12 RX ORDER — CLONIDINE HYDROCHLORIDE 0.1 MG/1
0.1 TABLET ORAL
Status: DISCONTINUED | OUTPATIENT
Start: 2021-05-12 | End: 2021-05-24 | Stop reason: HOSPADM

## 2021-05-12 RX ORDER — ADHESIVE BANDAGE
30 BANDAGE TOPICAL DAILY PRN
Status: DISCONTINUED | OUTPATIENT
Start: 2021-05-12 | End: 2021-05-24 | Stop reason: HOSPADM

## 2021-05-12 RX ORDER — HALOPERIDOL 5 MG/ML
2.5 INJECTION INTRAMUSCULAR
Status: DISCONTINUED | OUTPATIENT
Start: 2021-05-12 | End: 2021-05-24 | Stop reason: HOSPADM

## 2021-05-12 RX ORDER — BENZTROPINE MESYLATE 1 MG/1
0.5 TABLET ORAL
Status: DISCONTINUED | OUTPATIENT
Start: 2021-05-12 | End: 2021-05-24 | Stop reason: HOSPADM

## 2021-05-12 RX ORDER — OLANZAPINE 2.5 MG/1
2.5 TABLET ORAL
Status: DISCONTINUED | OUTPATIENT
Start: 2021-05-12 | End: 2021-05-24 | Stop reason: HOSPADM

## 2021-05-12 RX ORDER — DIPHENHYDRAMINE HYDROCHLORIDE 50 MG/ML
25 INJECTION, SOLUTION INTRAMUSCULAR; INTRAVENOUS
Status: DISCONTINUED | OUTPATIENT
Start: 2021-05-12 | End: 2021-05-24 | Stop reason: HOSPADM

## 2021-05-12 RX ADMIN — OLANZAPINE 2.5 MG: 2.5 TABLET, FILM COATED ORAL at 23:13

## 2021-05-12 RX ADMIN — TRAZODONE HYDROCHLORIDE 50 MG: 50 TABLET ORAL at 23:13
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
## 2021-05-12 NOTE — ED NOTES
Upon arrival to room  at bedside evaluating pt     Assumed care of pt via Police officers (EC[employer])  where pt is in restraints. Pt is A&O x 4 and states \" he is in the hospital to see if he is crazy to evaluate his mental health due to wanting to save kids from a drug dealer\" Per officer pt has been having eradicate behavior since last Thursday, Pt also stated that is when he recive his second [address] vaccination, Per officers have been  Called to the patient home 3x this week (may 9th,10th, and 12th) Officers reported when arriving today, pt had ran out his house screaming \"help\" and was not able to be redirected by pt son.  Officers states pt has no Pysch or dementia hx     1349 Mental health crisis counselor Nathaly Brennan at bedside evaluating pt     1500 Pt resting in stretcher in POC, no complaints at this time, Forensic restraints evaluated no sustained injury pt was assisted to the bathroom, Pt is NPO until CT scan     1538  at bedside reevaluating pt     1610 Pt is provided with  Food and drinks     1701 Forensic restraints evaluated no sustained injury

## 2021-05-13 ENCOUNTER — APPOINTMENT (OUTPATIENT)
Dept: GENERAL RADIOLOGY | Age: 71
DRG: 885 | End: 2021-05-13
Attending: STUDENT IN AN ORGANIZED HEALTH CARE EDUCATION/TRAINING PROGRAM
Payer: MEDICARE

## 2021-05-13 PROBLEM — F30.9 MANIA (HCC): Status: ACTIVE | Noted: 2021-05-13

## 2021-05-13 PROBLEM — F29 PSYCHOSIS (HCC): Status: ACTIVE | Noted: 2021-05-13

## 2021-05-13 PROBLEM — F29 PSYCHOSIS (HCC): Status: RESOLVED | Noted: 2021-05-13 | Resolved: 2021-05-13

## 2021-05-13 LAB
CHOLEST SERPL-MCNC: 138 MG/DL
HDLC SERPL-MCNC: 42 MG/DL
HDLC SERPL: 3.3 {RATIO} (ref 0–5)
LDLC SERPL CALC-MCNC: 66.2 MG/DL (ref 0–100)
TRIGL SERPL-MCNC: 149 MG/DL (ref ?–150)
TROPONIN I SERPL-MCNC: <0.05 NG/ML
TROPONIN I SERPL-MCNC: <0.05 NG/ML
VLDLC SERPL CALC-MCNC: 29.8 MG/DL

## 2021-05-13 PROCEDURE — 74011250637 HC RX REV CODE- 250/637: Performed by: NURSE PRACTITIONER

## 2021-05-13 PROCEDURE — 84484 ASSAY OF TROPONIN QUANT: CPT

## 2021-05-13 PROCEDURE — 36415 COLL VENOUS BLD VENIPUNCTURE: CPT

## 2021-05-13 PROCEDURE — 65220000001 HC RM PRIVATE PSYCH

## 2021-05-13 PROCEDURE — 74011250636 HC RX REV CODE- 250/636: Performed by: NURSE PRACTITIONER

## 2021-05-13 PROCEDURE — 71045 X-RAY EXAM CHEST 1 VIEW: CPT

## 2021-05-13 PROCEDURE — 99223 1ST HOSP IP/OBS HIGH 75: CPT | Performed by: PSYCHIATRY & NEUROLOGY

## 2021-05-13 PROCEDURE — 93005 ELECTROCARDIOGRAM TRACING: CPT

## 2021-05-13 PROCEDURE — 74011250637 HC RX REV CODE- 250/637: Performed by: PSYCHIATRY & NEUROLOGY

## 2021-05-13 PROCEDURE — 80061 LIPID PANEL: CPT

## 2021-05-13 RX ORDER — THERA TABS 400 MCG
1 TAB ORAL DAILY
Status: DISCONTINUED | OUTPATIENT
Start: 2021-05-14 | End: 2021-05-24 | Stop reason: HOSPADM

## 2021-05-13 RX ORDER — EZETIMIBE 10 MG/1
10 TABLET ORAL DAILY
COMMUNITY

## 2021-05-13 RX ORDER — EZETIMIBE 10 MG/1
10 TABLET ORAL DAILY
Status: DISCONTINUED | OUTPATIENT
Start: 2021-05-14 | End: 2021-05-24 | Stop reason: HOSPADM

## 2021-05-13 RX ORDER — GLIPIZIDE 5 MG/1
5 TABLET ORAL DAILY
Status: DISCONTINUED | OUTPATIENT
Start: 2021-05-14 | End: 2021-05-24 | Stop reason: HOSPADM

## 2021-05-13 RX ORDER — ESOMEPRAZOLE MAGNESIUM 40 MG/1
40 CAPSULE, DELAYED RELEASE ORAL DAILY
Status: ON HOLD | COMMUNITY
End: 2021-05-13

## 2021-05-13 RX ORDER — PREDNISONE 20 MG/1
20 TABLET ORAL DAILY
COMMUNITY
End: 2021-05-24

## 2021-05-13 RX ORDER — PYRIDOSTIGMINE BROMIDE 60 MG/1
30 TABLET ORAL 3 TIMES DAILY
Status: DISCONTINUED | OUTPATIENT
Start: 2021-05-13 | End: 2021-05-24 | Stop reason: HOSPADM

## 2021-05-13 RX ORDER — PREDNISONE 10 MG/1
10 TABLET ORAL
Status: ON HOLD | COMMUNITY
End: 2021-05-13

## 2021-05-13 RX ORDER — THERA TABS 400 MCG
1 TAB ORAL DAILY
COMMUNITY

## 2021-05-13 RX ORDER — GLIPIZIDE 5 MG/1
5 TABLET, FILM COATED, EXTENDED RELEASE ORAL DAILY
COMMUNITY

## 2021-05-13 RX ORDER — ROSUVASTATIN CALCIUM 10 MG/1
20 TABLET, COATED ORAL
Status: DISCONTINUED | OUTPATIENT
Start: 2021-05-13 | End: 2021-05-24 | Stop reason: HOSPADM

## 2021-05-13 RX ORDER — MYCOPHENOLATE MOFETIL 250 MG/1
500 CAPSULE ORAL 2 TIMES DAILY
Status: DISCONTINUED | OUTPATIENT
Start: 2021-05-13 | End: 2021-05-24 | Stop reason: HOSPADM

## 2021-05-13 RX ORDER — GUAIFENESIN 100 MG/5ML
81 LIQUID (ML) ORAL DAILY
Status: DISCONTINUED | OUTPATIENT
Start: 2021-05-14 | End: 2021-05-24 | Stop reason: HOSPADM

## 2021-05-13 RX ADMIN — HALOPERIDOL LACTATE 2.5 MG: 5 INJECTION, SOLUTION INTRAMUSCULAR at 12:23

## 2021-05-13 RX ADMIN — LISINOPRIL: 20 TABLET ORAL at 16:16

## 2021-05-13 RX ADMIN — GLIPIZIDE 5 MG: 5 TABLET ORAL at 05:58

## 2021-05-13 RX ADMIN — BENZTROPINE MESYLATE 0.5 MG: 1 TABLET ORAL at 12:22

## 2021-05-13 RX ADMIN — DIPHENHYDRAMINE HYDROCHLORIDE 25 MG: 50 INJECTION, SOLUTION INTRAMUSCULAR; INTRAVENOUS at 12:23

## 2021-05-13 RX ADMIN — OLANZAPINE 2.5 MG: 2.5 TABLET, FILM COATED ORAL at 11:44

## 2021-05-13 RX ADMIN — TRAZODONE HYDROCHLORIDE 50 MG: 50 TABLET ORAL at 21:14

## 2021-05-13 NOTE — BH NOTES
GROUP THERAPY PROGRESS NOTE    Patient did not participate in Substance Abuse/Coping Skills group.      Mk Smith LPC LSATP CSAC

## 2021-05-13 NOTE — GROUP NOTE
BRADLY  GROUP DOCUMENTATION INDIVIDUAL Group Therapy Note Date: 5/13/2021 Group Start Time: 1400 Group End Time: 1500 Group Topic: Recreational/Music Therapy [facility] 3 ACUTE [address] [facility], [facility] GROUP DOCUMENTATION GROUP Group Therapy Note Attendees: 8 Attendance: Did not attend Patient's Goal: Interventions/techniques: 
Radha Garcia

## 2021-05-13 NOTE — PROGRESS NOTES
Behavioral Services  Medicare Certification Upon Admission    I certify that this patient's inpatient psychiatric hospital admission is medically necessary for:    [x] (1) Treatment which could reasonably be expected to improve this patient's condition,       [x] (2) Or for diagnostic study;     AND     [x](2) The inpatient psychiatric services are provided while the individual is under the care of a physician and are included in the individualized plan of care.     Estimated length of stay/service 5-7 days    Plan for post-hospital care home    Electronically signed by Yancy Bustos MD on 5/13/2021 at 10:48 AM

## 2021-05-13 NOTE — ED NOTES
TRANSFER - OUT REPORT:    Verbal report given to Bear Zhu (name) on Karissa Wallace  being transferred to [address] [place] [address] (unit) for routine progression of care       Report consisted of patients Situation, Background, Assessment and   Recommendations(SBAR). Information from the following report(s) SBAR, Kardex, ED Summary and Recent Results was reviewed with the receiving nurse. Lines:       Opportunity for questions and clarification was provided.       Patient transported with:  [facility]

## 2021-05-13 NOTE — BH NOTES
A Rapid Response was called at 15:32. Patient was complaining of chest pain. The unit manager of [place], MST and hospitalist as well as the RR team came to the event in 317. Patients EKG was negative and the trop was negative. Patient states he has a problem with GERDS. Cardiology was consulted. Patient was joking during the event. His vials were 135/83, RR 18, Oxygen level 99% and heart rate was 98.   Patient will remain on BHU for now per MD.
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
## 2021-05-13 NOTE — GROUP NOTE
BRADLY  GROUP DOCUMENTATION INDIVIDUAL Group Therapy Note Date: 5/13/2021 Group Start Time: 1000 Group End Time: 1100 Group Topic: Topic Group [facility] 3 ACUTE BEHAV [facility] [place], [address], [place]. \Hospital Has a New Name and Outlook.\"" GROUP DOCUMENTATION GROUP Group Therapy Note Attendees: 5 Attendance: Did not attend Patient's Goal: Interventions/techniques: 
Geovani Ashley

## 2021-05-13 NOTE — PROGRESS NOTES
Problem: Manic Behavior (Adult/Pediatric)  Goal: *LTG: Verbalizes insights regarding recovery  Outcome: Progressing Towards Goal

## 2021-05-13 NOTE — PROGRESS NOTES
[facility] Medication Reconciliation     Information obtained from: Patient interview, [address], [address] [place] [address] pharmacy ([phone]), Dr. Marjan Garcia office ([phone])  RxQuery data Cathy Benedict: Yes    Comments/recommendations:  Patient was seen by his neurologist on 4/21/21 (see Chart Review -->Encounters for note) with recommendations to re-start mycophenolate (patient had let prescription lapse), continue with pyridostigmine for symptom management, and to continue prednisone 20 mg daily with plan to decrease dose to 10 mg daily once out of the 20 mg dose. Patient has not picked-up the most recent prednisone or mycophenolate prescriptions from his pharmacy. Patient reports stopping one of his cholesterol medications but unable to tell writer which one. Patient instructed writer to call Dr. Marjan Garcia office in [place] to clarify. Per Dr. Serena Lara office, he is still supposed to be taking both cholesterol medications (rosuvastatin, ezetimibe). Medication changes (since last review): Added  Ezetimibe  Removed  Atorvastatin  Hydrocodone-acetaminophen  Lisinopril   Omeprazole   Zolpidem  Adjusted  Glipizide dosage form adjusted from IR to XR  Prednisone dose adjusted to 20 mg daily    The [facility] Program () was accessed to determine fill history of any controlled medications. No records found for past 2 years. 1[employer]Query pharmacy benefit data reflects medications filled and processed through the patient's insurance, however                this data does NOT capture whether the medication was picked up or is currently being taken by the patient.        Total Time Spent: 40 minutes    Past Medical History/Disease States:  Past Medical History:   Diagnosis Date    Anxiety disorder     Cancer ([facility] [address]     PROSTATE; BCCA    COVID-19 01/01/2021    Diabetes ([facility] [address]     type II    GERD (gastroesophageal reflux disease)     Hypertension     Neurological disorder          Patient allergies: Allergies as of 2021 - Review Complete 2021   Allergen Reaction Noted    Codeine Nausea Only 2015         Prior to Admission Medications   Prescriptions Last Dose Informant Patient Reported? Taking?   escitalopram oxalate (LEXAPRO) 20 mg tablet  Other Yes Yes   Sig: Take 20 mg by mouth daily. Indications: major depressive disorder   ezetimibe (ZETIA) 10 mg tablet Unknown at Unknown time Other Yes No   Sig: Take 10 mg by mouth daily. Indications: excessive fat in the blood   glipiZIDE SR (GLUCOTROL XL) 5 mg CR tablet  Other Yes Yes   Sig: Take 5 mg by mouth daily. Indications: type 2 diabetes mellitus   lisinopril-hydroCHLOROthiazide (PRINZIDE, ZESTORETIC) 20-25 mg per tablet  Other Yes Yes   Sig: Take 1 Tab by mouth daily. Indications: high blood pressure   mycophenolate (CELLCEPT) 500 mg tablet Unknown at Unknown time Other No No   Sig: Take 1 Tab by mouth two (2) times a day. Patient taking differently: Take 500 mg by mouth two (2) times a day. Indications: myasthenia gravis   predniSONE (DELTASONE) 10 mg tablet Unknown at Unknown time Other Yes No   Sig: Take 10 mg by mouth daily (with breakfast). pyridostigmine (MESTINON) 60 mg tablet Unknown at Unknown time Other No No   Si/2 tab three times daily   Patient taking differently: Take 30 mg by mouth three (3) times daily. 1/2 tab three times daily  Indications: myasthenia gravis, a skeletal muscle disorder   rosuvastatin (CRESTOR) 20 mg tablet Unknown at Unknown time Other Yes No   Sig: Take 20 mg by mouth nightly. Indications: excessive fat in the blood   therapeutic multivitamin (THERAGRAN) tablet  Other Yes Yes   Sig: Take 1 Tab by mouth daily. traZODone (DESYREL) 50 mg tablet 2021 at Unknown time Other Yes Yes   Sig: Take 50 mg by mouth nightly as needed for Sleep.       Facility-Administered Medications: None          Thank you,  Lucas Edwards, PHARMD, BCPS  Clinical Pharmacy Specialist, Behavioral Health  Desk: [phone] ([id number]  Pharmacy: [phone] ([id number])

## 2021-05-13 NOTE — ED NOTES
Pt transported out of the ER by [facility] to be transported to [address] [place] [address].

## 2021-05-13 NOTE — PROGRESS NOTES
AKA \"Eusebio\". Pt is A&Ox4. Pt is very talkative, grandiose. Continues to speak of who he knows, what judges he knows, and how they will get him out of here. He is very adamant about his child or children in saving them from alcoholism. He wants to save all of the children from substance abuse. Pt denies SI/HI, and A/VH. Last BM this morning. Pt BP this morning elevated at 150/88, . Will continue to monitor. Pt phone priveleges restricted due to calling police and [employer] Rec'd a call to unit from [facility] [employer] stating that Americo Lopez" called them and stated, \" I'm gonna act the fool so that they can give me shots\"  RN and RN Supervisor agreed to themselves that \"Eusebio\" is not acting. Pt given PRN zyprexa for escalating and agitation, disrupting milieu. Pt hyperverbal ways began to escalate further. Pt became loud,agitated, started shaking. Pt given Haldol 2.5mg IM, Benadryl 25mg IM, and Cogentin 0.5mg. Pt eventually calmed down and went to sleep shortly after. 15:32 code rapid response called on pt. Pt c/o chest pain and left-sided weakness. /83, HR 99. Pt was given lisinopril-20mg and HCTYZ-25mg. Vitals retaken at 1800, /67, HR 99. Pt refused evening medications, cellcept and mestinon. Pt was educated on medications. Pt stated he did not need them, and that he felt fine. Documented as \"refused\". Pt is diet compliant, eats very well. Pt is calm and in bed toward end of shift.

## 2021-05-13 NOTE — PROGRESS NOTES
2100: Pt accepted report from charge nurse. 2313: Pt accepted PRN zyprexa for racing thoughts and trazodone for sleep. 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 \"    0000: Pt appears asleep, breathing visible. PRN effective.     0300: Pt appears asleep, breathing visible    0600: Pt accepted scheduled medication. 0700: Gave off shift report to oncoming nurse.     Pt slept for 5.5 hours

## 2021-05-13 NOTE — PROGRESS NOTES
Problem: Manic Behavior (Adult/Pediatric)  Goal: *STG: Demonstrates improvement in thought process and speech pattern  Outcome: Progressing Towards Goal  Goal: *STG: Maintains appropriate boundaries  Outcome: Progressing Towards Goal     Problem: Altered Thought Process (Adult/Pediatric)  Goal: *STG: Remains safe in hospital  Outcome: Progressing Towards Goal

## 2021-05-13 NOTE — CONSULTS
Hospitalist Consultation Note    NAME:  Shawnee Kayser   :   1950   MRN:   [id number]   Room Number: 935/59  @ [facility]     ATTENDING: Susana Lemus MD  PCP:  Kelly Tneorio MD    Date/Time:  2021 4:11 PM      Recommendations/Plan:       Active Problems:    Psychosis ([address] (2021)         #New onset chest pain   - RR called due to sudden onset of chest pain   - patient states he was again when he had left-sided chest pain radiating to his left arm  -Patient denied any radiation to his left side of the jaw  -Patient said he was handcuffed yesterday and it might be soreness from that  -Denied any history of heart attack or chest pain in the past  -Start aspirin and statin  -Check troponin stat every 6 hours x 3  -Check lipid profile  - Check CXR   -Repeat EKG in the morning  -EKG reviewed independently nonspecific T wave noted in V1 normal sinus rhythm  -Echo in the morning  -Cardiology consult for possible stress test  -Risk factors include hypertension , diabetes mellitus and hyperlipidemia    #Myasthenia gravis  -On CellCept and pyridostigmine    #Diabetes mellitus type 2  -A1c pending  -On glipizide     #Hyperlipidemia  -On Crestor 20 mg    #Hypertension  -On HCTZ and lisinopril    #Psychosis              Subjective:   REQUESTING PHYSICIAN:  REASON FOR CONSULT:      Marisela Moya is a 80 y.o.  male who I was asked to see for rapid response. Patient states that he was walking in the hallway when he has a chest pain left side rating to his left arm. Patient denied any active chest pain during the encounter. Patient states that he was handcuffed yesterday and he believes that his left arm is hurting because of that. Patient denied any history of chest pain in the past.  Patient denied any diaphoresis. Patient stated he has a history of diabetes mellitus high blood pressure and hyperlipidemia.   Patient denied any tobacco use            Past Medical History: Diagnosis Date    Anxiety disorder     Cancer ([facility] [address]     PROSTATE; BCCA    COVID-19 01/01/2021    Diabetes ([facility]     type II    GERD (gastroesophageal reflux disease)     Hypertension     Neurological disorder       Past Surgical History:   Procedure Laterality Date    HX OTHER SURGICAL      Horse Accident-surgery to face for fx    HX OTHER SURGICAL Right     Ear Surgery post facial surgery for scar tissue removal    HX UROLOGICAL       Social History     Tobacco Use    Smoking status: Never Smoker    Smokeless tobacco: Never Used   Substance Use Topics    Alcohol use: Yes     Comment: 1 drink 3x a week      Family History   Problem Relation Age of Onset    Cancer Brother         LUNG; THROAT    Anesth Problems Neg Hx        Allergies   Allergen Reactions    Codeine Nausea Only      Prior to Admission medications    Medication Sig Start Date End Date Taking? Authorizing Provider   glipiZIDE SR (GLUCOTROL XL) 5 mg CR tablet Take 5 mg by mouth daily. Indications: type 2 diabetes mellitus   Yes Provider, Historical   therapeutic multivitamin (THERAGRAN) tablet Take 1 Tab by mouth daily. Yes Provider, Historical   predniSONE (DELTASONE) 20 mg tablet Take 20 mg by mouth daily. Yes Provider, Historical   escitalopram oxalate (LEXAPRO) 20 mg tablet Take 20 mg by mouth daily. Indications: major depressive disorder 3/11/21  Yes Provider, Historical   lisinopril-hydroCHLOROthiazide (PRINZIDE, ZESTORETIC) 20-25 mg per tablet Take 1 Tab by mouth daily. Indications: high blood pressure 3/12/21  Yes Provider, Historical   traZODone (DESYREL) 50 mg tablet Take 50 mg by mouth nightly as needed for Sleep. 12/1/20  Yes Provider, Historical   ezetimibe (ZETIA) 10 mg tablet Take 10 mg by mouth daily. Indications: excessive fat in the blood    Provider, Historical   rosuvastatin (CRESTOR) 20 mg tablet Take 20 mg by mouth nightly.  Indications: excessive fat in the blood 12/2/20   Provider, Historical   mycophenolate (CELLCEPT) 500 mg tablet Take 1 Tab by mouth two (2) times a day. Patient taking differently: Take 500 mg by mouth two (2) times a day. Indications: myasthenia gravis 4/21/21   Mónica Urrutia DO   pyridostigmine (MESTINON) 60 mg tablet 1/2 tab three times daily  Patient taking differently: Take 30 mg by mouth three (3) times daily. 1/2 tab three times daily  Indications: myasthenia gravis, a skeletal muscle disorder 4/21/21   Mónica Urrutia DO       REVIEW OF SYSTEMS:     Total of 12 systems reviewed as follows:   I am not able to complete the review of systems because:    The patient is intubated and sedated    The patient has altered mental status due to his acute medical problems    The patient has baseline aphasia from prior stroke(s)    The patient has baseline dementia and is not reliable historian                 POSITIVE= underlined text  Negative = text not underlined  General:  fever, chills, sweats, generalized weakness, weight loss/gain,      loss of appetite   Eyes:    blurred vision, eye pain, loss of vision, double vision  ENT:    rhinorrhea, pharyngitis   Respiratory:   cough, sputum production, SOB, wheezing, SUAREZ, pleuritic pain   Cardiology:   chest pain, palpitations, orthopnea, PND, edema, syncope   Gastrointestinal:  abdominal pain , N/V, dysphagia, diarrhea, constipation, bleeding   Genitourinary:  frequency, urgency, dysuria, hematuria, incontinence   Muskuloskeletal :  arthralgia, myalgia   Hematology:  easy bruising, nose or gum bleeding, lymphadenopathy   Dermatological: rash, ulceration, pruritis   Endocrine:   hot flashes or polydipsia   Neurological:  headache, dizziness, confusion, focal weakness, paresthesia,     Speech difficulties, memory loss, gait disturbance  Psychological: Feelings of anxiety, depression, agitation    Objective:   VITALS:    Visit Vitals  BP (!) 150/88 (BP Patient Position: Standing)   Pulse (!) 110   Temp 98.2 °F (36.8 °C)   Resp 18   Ht 5' 5\" (1.651 m)   Wt 71.4 kg (157 lb 4.8 oz)   SpO2 98%   BMI 26.18 kg/m²     Temp (24hrs), Av.5 °F (36.9 °C), Min:98.2 °F (36.8 °C), Max:98.8 °F (37.1 °C)      PHYSICAL EXAM:   General:    Alert, cooperative, no distress, appears stated age. HEENT: Atraumatic, anicteric sclerae, pink conjunctivae     No oral ulcers, mucosa moist, throat clear  Neck:  Supple, symmetrical,  thyroid: non tender  Lungs:   Clear to auscultation bilaterally. No Wheezing or Rhonchi. No rales. Chest wall:  +  tenderness left side no Accessory muscle use. Heart:   Regular  rhythm,  No  murmur   No edema  Abdomen:   Soft, non-tender. Not distended. Bowel sounds normal  Extremities: No cyanosis. No clubbing  Skin:     Not pale. Not Jaundiced  No rashes   Psych:  . Not anxious or agitated. Neurologic: EOMs intact. No facial asymmetry. No aphasia or slurred speech. Symmetrical strength, Alert and oriented X 4.     _______________________________________________________________________  Care Plan discussed with:    Comments   Patient x    Family      RN x    Care Manager                    Consultant:  [facility]    ____________________________________________________________________  TOTAL TIME:     35 mins    Comments    x Reviewed previous records   >50% of visit spent in counseling and coordination of care x Discussion with patient and/or family and questions answered       Critical Care Provided     Minutes non procedure based  ________________________________________________________________________  Signed: Mandy Arana MD      Procedures: see electronic medical records for all procedures/Xrays and details which were not copied into this note but were reviewed prior to creation of Plan. LAB DATA REVIEWED:    No results found for this or any previous visit (from the past 24 hour(s)).     _____________________________  Hospitalist: Mandy Arana MD

## 2021-05-13 NOTE — H&P
INITIAL PSYCHIATRIC EVALUATION            IDENTIFICATION:    Patient Name  Karissa Wallace   Date of Birth 1950   [facility] [id number]   Medical Record Number  [id number]      Age  80 y.o. PCP Alan Patel MD   Admit date:  5/12/2021    Room Number  317/01  @ [facility]   Date of Service  5/13/2021            HISTORY         REASON FOR HOSPITALIZATION:  CC: \"nick\". Pt admitted under a temporary senior care order (TDO) for severe nick proving to be an imminent danger to self and an inability to care for self. HISTORY OF PRESENT ILLNESS:    The patient, Karissa Wallace, is a 80 y.o. WHITE male with a past psychiatric history significant for major depressive disorder, who presents at this time with complaints of (and/or evidence of) the following emotional symptoms: delusions and nick. Additional symptomatology include poor self care. The above symptoms have been present for 2+ weeks. These symptoms are of moderate to high severity. These symptoms are constant in nature. The patient's condition has been precipitated by psychosocial stressors. Patient's condition made worse by treatment noncompliance. UDS: negative; BAL=0. The patient is a tangential historian. The patient corroborates the above narrative. The patient contracts for safety on the unit and gives consent for the team to contact collateral. The patient is amenable to initiating treatment while on the unit. On interview, the patient is grossly manic, preoccupied with his son potentially using marijuana and the legality of the substance in general. He speaks at length, and is focused on discharge. He has some insight into how he is presenting as he keeps staying 'this all seems crazy I know.' He at times accusing the staff of holding him illegally, threatens to zion MD for his senior care and does not voice understanding of the TDO process. He is otherwise euphoric with ongoing difficulty to redirect.  After interview, patient returns to treatment team demanding someone call his local political official to help get him out of the hospital. Patient given access to the unit phone to speak with friends and family. ALLERGIES:   Allergies   Allergen Reactions    Codeine Nausea Only      MEDICATIONS PRIOR TO ADMISSION:   Medications Prior to Admission   Medication Sig    glipiZIDE SR (GLUCOTROL XL) 5 mg CR tablet Take 5 mg by mouth daily. Indications: type 2 diabetes mellitus    therapeutic multivitamin (THERAGRAN) tablet Take 1 Tab by mouth daily.  ezetimibe (ZETIA) 10 mg tablet Take 10 mg by mouth daily. Indications: excessive fat in the blood    esomeprazole (NEXIUM) 40 mg capsule Take 40 mg by mouth daily. Indications: gastroesophageal reflux disease    escitalopram oxalate (LEXAPRO) 20 mg tablet Take 20 mg by mouth daily. Indications: major depressive disorder    rosuvastatin (CRESTOR) 20 mg tablet Take 20 mg by mouth nightly. Indications: excessive fat in the blood    lisinopril-hydroCHLOROthiazide (PRINZIDE, ZESTORETIC) 20-25 mg per tablet Take 1 Tab by mouth daily. Indications: high blood pressure    traZODone (DESYREL) 50 mg tablet Take 50 mg by mouth nightly as needed for Sleep.  mycophenolate (CELLCEPT) 500 mg tablet Take 1 Tab by mouth two (2) times a day. (Patient taking differently: Take 500 mg by mouth two (2) times a day. Indications: myasthenia gravis)    predniSONE (DELTASONE) 10 mg tablet Take 10 mg by mouth daily. (Patient taking differently: Take 10 mg by mouth daily. Indications: myasthenia gravis, a skeletal muscle disorder)    pyridostigmine (MESTINON) 60 mg tablet 1/2 tab three times daily (Patient taking differently: Take 30 mg by mouth three (3) times daily.  1/2 tab three times daily  Indications: myasthenia gravis, a skeletal muscle disorder)      PAST MEDICAL HISTORY:   Past Medical History:   Diagnosis Date    Anxiety disorder     Cancer ([address]     PROSTATE; BCCA    COVID-19 01/01/2021    Diabetes ([facility] [address]     type II    GERD (gastroesophageal reflux disease)     Hypertension     Neurological disorder      Past Surgical History:   Procedure Laterality Date    HX OTHER SURGICAL      Horse Accident-surgery to face for fx    HX OTHER SURGICAL Right     Ear Surgery post facial surgery for scar tissue removal    HX UROLOGICAL        SOCIAL HISTORY:  The patient is currently retired; the patient is not a smoker; the patient's marital status is ; the patient has two adult children with whom he lives along with his wife; the patient reports the highest level of education achieved is GED. FAMILY HISTORY: History reviewed, pertinent family history as below:   Family History   Problem Relation Age of Onset    Cancer Brother         LUNG; THROAT    Anesth Problems Neg Hx        REVIEW OF SYSTEMS:   Pertinent items are noted in the History of Present Illness. All other Systems reviewed and are considered negative. MENTAL STATUS EXAM & VITALS     MENTAL STATUS EXAM (MSE):    MSE FINDINGS ARE WITHIN NORMAL LIMITS (WNL) UNLESS OTHERWISE STATED BELOW. ( ALL OF THE BELOW CATEGORIES OF THE MSE HAVE BEEN REVIEWED (reviewed 5/13/2021) AND UPDATED AS DEEMED APPROPRIATE )  General Presentation age appropriate, uncooperative   Orientation oriented to time, place and person   Vital Signs  See below (reviewed 5/13/2021); Vital Signs (BP, Pulse, & Temp) are within normal limits if not listed below.    Gait and Station Stable/steady, no ataxia   Musculoskeletal System No extrapyramidal symptoms (EPS); no abnormal muscular movements or Tardive Dyskinesia (TD); muscle strength and tone are within normal limits   Language No aphasia or dysarthria   Speech:  hyperverbal and pressured   Thought Processes illogical; fast rate of thoughts; poor abstract reasoning/computation   Thought Associations tangential   Thought Content obsessions  and preoccupations   Suicidal Ideations none   Homicidal Ideations none Mood:  euphoric and labile    Affect:  full range and increased in intensity   Memory recent  fair   Memory remote:  fair   Concentration/Attention:  intact   Fund of Knowledge average   Insight:  poor   Reliability fair   Judgment:  poor          VITALS:     Patient Vitals for the past 24 hrs:   Temp Pulse Resp BP SpO2   05/13/21 0800 98.2 °F (36.8 °C) (!) 110 18 (!) 150/88 98 %   05/12/21 2051 98.5 °F (36.9 °C) (!) 101 18 (!) 142/75 96 %     Wt Readings from Last 3 Encounters:   05/12/21 71.4 kg (157 lb 4.8 oz)   05/12/21 72.6 kg (160 lb)   12/07/20 79.4 kg (175 lb)     Temp Readings from Last 3 Encounters:   05/13/21 98.2 °F (36.8 °C)   05/12/21 98.8 °F (37.1 °C)   07/20/20 98.3 °F (36.8 °C)     BP Readings from Last 3 Encounters:   05/13/21 (!) 150/88   05/12/21 (!) 142/100   04/21/21 (!) 154/84     Pulse Readings from Last 3 Encounters:   05/13/21 (!) 110   05/12/21 94   04/21/21 76            DATA     LABORATORY DATA:  Labs Reviewed - No data to display  Admission on 05/12/2021, Discharged on 05/12/2021   Component Date Value Ref Range Status    WBC 05/12/2021 9.8  4.1 - 11.1 K/uL Final    RBC 05/12/2021 4.97  4.10 - 5.70 M/uL Final    HGB 05/12/2021 15.6  12.1 - 17.0 g/dL Final    HCT 05/12/2021 46.7  36.6 - 50.3 % Final    MCV 05/12/2021 94.0  80.0 - 99.0 FL Final    MCH 05/12/2021 31.4  26.0 - 34.0 PG Final    MCHC 05/12/2021 33.4  30.0 - 36.5 g/dL Final    RDW 05/12/2021 12.5  11.5 - 14.5 % Final    PLATELET 75/62/9236 940  150 - 400 K/uL Final    MPV 05/12/2021 9.6  8.9 - 12.9 FL Final    NRBC 05/12/2021 0.0  0  WBC Final    ABSOLUTE NRBC 05/12/2021 0.00  0.00 - 0.01 K/uL Final    NEUTROPHILS 05/12/2021 72  32 - 75 % Final    LYMPHOCYTES 05/12/2021 14  12 - 49 % Final    MONOCYTES 05/12/2021 10  5 - 13 % Final    EOSINOPHILS 05/12/2021 3  0 - 7 % Final    BASOPHILS 05/12/2021 1  0 - 1 % Final    IMMATURE GRANULOCYTES 05/12/2021 0  0.0 - 0.5 % Final    ABS.  NEUTROPHILS 05/12/2021 7.1  1.8 - 8.0 K/UL Final    ABS. LYMPHOCYTES 05/12/2021 1.3  0.8 - 3.5 K/UL Final    ABS. MONOCYTES 05/12/2021 1.0  0.0 - 1.0 K/UL Final    ABS. EOSINOPHILS 05/12/2021 0.3  0.0 - 0.4 K/UL Final    ABS. BASOPHILS 05/12/2021 0.1  0.0 - 0.1 K/UL Final    ABS. IMM. GRANS. 05/12/2021 0.0  0.00 - 0.04 K/UL Final    DF 05/12/2021 AUTOMATED    Final    Sodium 05/12/2021 136  136 - 145 mmol/L Final    Potassium 05/12/2021 3.9  3.5 - 5.1 mmol/L Final    Chloride 05/12/2021 105  97 - 108 mmol/L Final    CO2 05/12/2021 25  21 - 32 mmol/L Final    Anion gap 05/12/2021 6  5 - 15 mmol/L Final    Glucose 05/12/2021 105* 65 - 100 mg/dL Final    BUN 05/12/2021 13  6 - 20 MG/DL Final    Creatinine 05/12/2021 0.69* 0.70 - 1.30 MG/DL Final    BUN/Creatinine ratio 05/12/2021 19  12 - 20   Final    GFR est AA 05/12/2021 >60  >60 ml/min/1.73m2 Final    GFR est non-AA 05/12/2021 >60  >60 ml/min/1.73m2 Final    Calcium 05/12/2021 9.1  8.5 - 10.1 MG/DL Final    Bilirubin, total 05/12/2021 0.4  0.2 - 1.0 MG/DL Final    ALT (SGPT) 05/12/2021 20  12 - 78 U/L Final    AST (SGOT) 05/12/2021 12* 15 - 37 U/L Final    Alk.  phosphatase 05/12/2021 50  45 - 117 U/L Final    Protein, total 05/12/2021 6.9  6.4 - 8.2 g/dL Final    Albumin 05/12/2021 3.6  3.5 - 5.0 g/dL Final    Globulin 05/12/2021 3.3  2.0 - 4.0 g/dL Final    A-G Ratio 05/12/2021 1.1  1.1 - 2.2   Final    Salicylate level 35/92/8328 <1.7* 2.8 - 20.0 MG/DL Final    Acetaminophen level 05/12/2021 <2* 10 - 30 ug/mL Final    ALCOHOL(ETHYL),SERUM 05/12/2021 <10  <10 MG/DL Final    Color 05/12/2021 YELLOW/STRAW    Final    Appearance 05/12/2021 CLEAR  CLEAR   Final    Specific gravity 05/12/2021 1.019  1.003 - 1.030   Final    pH (UA) 05/12/2021 5.5  5.0 - 8.0   Final    Protein 05/12/2021 Negative  NEG mg/dL Final    Glucose 05/12/2021 Negative  NEG mg/dL Final    Ketone 05/12/2021 15* NEG mg/dL Final    Bilirubin 05/12/2021 Negative  NEG   Final  Blood 05/12/2021 Negative  NEG   Final    Urobilinogen 05/12/2021 1.0  0.2 - 1.0 EU/dL Final    Nitrites 05/12/2021 Negative  NEG   Final    Leukocyte Esterase 05/12/2021 Negative  NEG   Final    WBC 05/12/2021 0-4  0 - 4 /hpf Final    RBC 05/12/2021 0-5  0 - 5 /hpf Final    Epithelial cells 05/12/2021 FEW  FEW /lpf Final    Bacteria 05/12/2021 Negative  NEG /hpf Final    UA:UC IF INDICATED 05/12/2021 CULTURE NOT INDICATED BY UA RESULT  CNI   Final    Mucus 05/12/2021 1+* NEG /lpf Final    AMPHETAMINES 05/12/2021 Negative  NEG   Final    BARBITURATES 05/12/2021 Negative  NEG   Final    BENZODIAZEPINES 05/12/2021 Negative  NEG   Final    COCAINE 05/12/2021 Negative  NEG   Final    METHADONE 05/12/2021 Negative  NEG   Final    OPIATES 05/12/2021 Negative  NEG   Final    PCP(PHENCYCLIDINE) 05/12/2021 Negative  NEG   Final    THC (TH-CANNABINOL) 05/12/2021 Negative  NEG   Final    Drug screen comment 05/12/2021 (NOTE)   Final    Specimen source 05/12/2021 Nasopharyngeal    Final    COVID-19 rapid test 05/12/2021 Not detected  NOTD   Final    SARS-CoV-2 05/12/2021 Please find results under separate order    Final    SARS-CoV-2 05/12/2021 Not detected  NOTD   Final    Influenza A by PCR 05/12/2021 Not detected  NOTD   Final    Influenza B by PCR 05/12/2021 Not detected  NOTD   Final        RADIOLOGY REPORTS:  Results from Hospital Encounter encounter on 10/09/20   XR SHOULDER LT AP/LAT MIN 2 V    Narrative EXAM: XR SHOULDER LT AP/LAT MIN 2 V    INDICATION: Left anterior shoulder pain. COMPARISON: None. FINDINGS: Three views of the left shoulder demonstrate no fracture, dislocation  or other acute abnormality. Impression IMPRESSION: No acute abnormality. Ct Head Wo Cont    Result Date: 5/12/2021  INDICATION: Altered mental status Exam: Noncontrast CT of the brain is performed with 5 mm collimation.  CT dose reduction was achieved with the use of the standardized protocol tailored for this examination and automatic exposure control for dose modulation. Direct comparison is made to prior MR dated 10/2019. FINDINGS: There is no acute intracranial hemorrhage, mass, mass effect or herniation. Ventricular system is normal. The gray-white matter differentiation is well-preserved. The mastoid air cells are well pneumatized. No acute intracranial hemorrhage, mass or infarct. MEDICATIONS       ALL MEDICATIONS  Current Facility-Administered Medications   Medication Dose Route Frequency    OLANZapine (ZyPREXA) tablet 2.5 mg  2.5 mg Oral Q6H PRN    haloperidol lactate (HALDOL) injection 2.5 mg  2.5 mg IntraMUSCular Q6H PRN    benztropine (COGENTIN) tablet 0.5 mg  0.5 mg Oral BID PRN    diphenhydrAMINE (BENADRYL) injection 25 mg  25 mg IntraMUSCular BID PRN    acetaminophen (TYLENOL) tablet 650 mg  650 mg Oral Q4H PRN    magnesium hydroxide (MILK OF MAGNESIA) 400 mg/5 mL oral suspension 30 mL  30 mL Oral DAILY PRN    cloNIDine HCL (CATAPRES) tablet 0.1 mg  0.1 mg Oral Q4H PRN    glipiZIDE (GLUCOTROL) tablet 5 mg  5 mg Oral ACB    traZODone (DESYREL) tablet 50 mg  50 mg Oral QHS PRN      SCHEDULED MEDICATIONS  Current Facility-Administered Medications   Medication Dose Route Frequency    glipiZIDE (GLUCOTROL) tablet 5 mg  5 mg Oral ACB                ASSESSMENT & PLAN        The patient, Devon Perales, is a 80 y.o.  male who presents at this time for treatment of the following diagnoses:  Patient Active Hospital Problem List:   Sarah    Assessment: patient floridly manic, preoccupied with his children using drugs amongst other ongoing preoccupations. Per collateral, he has decompensated in the community, inappropriate behaviors have been noted at various public places. CT WNL, labwork unremarkable. Diff Dx includes neuropsychiatric sequelae of COVID-19 / malignancy, or steroid / SSRI induced sarah.  Will observe for now, obtain further information regarding psychiatric history, consider mood stabilizer. Plan:  - Observation  - Consider VPA ER for nick  - IGM therapy as tolerated  - Expand database / obtain collateral  - Dispo planning (home)           A coordinated, multidisplinary treatment team (includes the nurse, unit pharmacist,  and writer) round was conducted for this initial evaluation with the patient present. The following regarding medications was addressed during rounds with patient: the risks and benefits of the proposed medication. The patient was given the opportunity to ask questions. Informed consent given to the use of the above medications. I will continue to adjust psychiatric and non-psychiatric medications (see above \"medication\" section and orders section for details) as deemed appropriate & based upon diagnoses and response to treatment. I have reviewed admission (and previous/old) labs and medical tests in the EHR and or transferring hospital documents. I will continue to order blood tests/labs and diagnostic tests as deemed appropriate and review results as they become available (see orders for details). I have reviewed old psychiatric and medical records available in the EHR. I Will order additional psychiatric records from other institutions to further elucidate the nature of patient's psychopathology and review once available. I will gather additional collateral information from friends, family and o/p treatment team to further elucidate the nature of patient's psychopathology and baselline level of psychiatric functioning. I certify that this patient's inpatient psychiatric hospital services are required for treatment that could reasonably be expected to improve the patient's condition, or for diagnostic study, and that the patient continues to need, on a daily basis, active treatment furnished directly by or requiring the supervision of inpatient psychiatric facility personnel.  In addition, the hospital records show that services furnished were intensive treatment services, admission or related services, or equivalent services.       ESTIMATED LENGTH OF STAY:  305 days       STRENGTHS:  Exercising self-direction/Resourceful, Access to housing/residential stability and Interpersonal/supportive relationships (family, friends, peers)                                        SIGNED:    Mahad Stock MD  5/13/2021

## 2021-05-14 ENCOUNTER — APPOINTMENT (OUTPATIENT)
Dept: NON INVASIVE DIAGNOSTICS | Age: 71
DRG: 885 | End: 2021-05-14
Attending: STUDENT IN AN ORGANIZED HEALTH CARE EDUCATION/TRAINING PROGRAM
Payer: MEDICARE

## 2021-05-14 LAB
ATRIAL RATE: 82 BPM
CALCULATED P AXIS, ECG09: 69 DEGREES
CALCULATED R AXIS, ECG10: 56 DEGREES
CALCULATED T AXIS, ECG11: 62 DEGREES
CHOLEST SERPL-MCNC: 147 MG/DL
DIAGNOSIS, 93000: NORMAL
ECHO AO ROOT DIAM: 2.35 CM
ECHO AV AREA PLAN: 2.84 CM2
ECHO EST RA PRESSURE: 5 MMHG
ECHO LA AREA 4C: 11.88 CM2
ECHO LA MAJOR AXIS: 2.98 CM
ECHO LA MINOR AXIS: 1.67 CM
ECHO LA VOL 4C: 24.69 ML (ref 18–58)
ECHO LA VOLUME INDEX A4C: 13.82 ML/M2 (ref 16–28)
ECHO LV EDV A4C: 79.21 ML
ECHO LV EDV INDEX A4C: 44.3 ML/M2
ECHO LV EJECTION FRACTION A4C: 83 PERCENT
ECHO LV ESV A4C: 13.16 ML
ECHO LV ESV INDEX A4C: 7.4 ML/M2
ECHO LV INTERNAL DIMENSION DIASTOLIC: 3.58 CM (ref 4.2–5.9)
ECHO LV INTERNAL DIMENSION SYSTOLIC: 2.01 CM
ECHO LV IVSD: 1.37 CM (ref 0.6–1)
ECHO LV MASS 2D: 134.2 G (ref 88–224)
ECHO LV MASS INDEX 2D: 75.1 G/M2 (ref 49–115)
ECHO LV POSTERIOR WALL DIASTOLIC: 0.96 CM (ref 0.6–1)
ECHO LVOT DIAM: 1.81 CM
ECHO LVOT PEAK GRADIENT: 6.96 MMHG
ECHO LVOT PEAK VELOCITY: 131.87 CM/S
ECHO MV A VELOCITY: 55.09 CM/S
ECHO MV AREA PHT: 4.86 CM2
ECHO MV AREA PHT: 5.8 CM2
ECHO MV AREA PLAN: 5.29 CM2
ECHO MV E DECELERATION TIME (DT): 130.79 MS
ECHO MV E VELOCITY: 28.22 CM/S
ECHO MV E/A RATIO: 0.51
ECHO MV MAX VELOCITY: 70.51 CM/S
ECHO MV MEAN GRADIENT: 0.88 MMHG
ECHO MV PEAK GRADIENT: 1.99 MMHG
ECHO MV PRESSURE HALF TIME (PHT): 37.93 MS
ECHO MV PRESSURE HALF TIME (PHT): 45.24 MS
ECHO MV VTI: 12.68 CM
ECHO PV PEAK INSTANTANEOUS GRADIENT SYSTOLIC: 3.46 MMHG
ECHO PV REGURGITANT MAX VELOCITY: 92.94 CM/S
ECHO RA AREA 4C: 13.49 CM2
ECHO RIGHT VENTRICULAR SYSTOLIC PRESSURE (RVSP): 25.8 MMHG
ECHO TV REGURGITANT MAX VELOCITY: 197 CM/S
ECHO TV REGURGITANT MAX VELOCITY: 227.93 CM/S
ECHO TV REGURGITANT PEAK GRADIENT: 20.8 MMHG
EST. AVERAGE GLUCOSE BLD GHB EST-MCNC: 120 MG/DL
GLUCOSE BLD STRIP.AUTO-MCNC: 185 MG/DL (ref 65–117)
HBA1C MFR BLD: 5.8 % (ref 4–5.6)
HDLC SERPL-MCNC: 46 MG/DL
HDLC SERPL: 3.2 {RATIO} (ref 0–5)
LDLC SERPL CALC-MCNC: 76.6 MG/DL (ref 0–100)
P-R INTERVAL, ECG05: 154 MS
Q-T INTERVAL, ECG07: 346 MS
QRS DURATION, ECG06: 92 MS
QTC CALCULATION (BEZET), ECG08: 404 MS
SERVICE CMNT-IMP: ABNORMAL
TRIGL SERPL-MCNC: 122 MG/DL (ref ?–150)
TROPONIN I SERPL-MCNC: <0.05 NG/ML
TSH SERPL DL<=0.05 MIU/L-ACNC: 0.97 UIU/ML (ref 0.36–3.74)
VENTRICULAR RATE, ECG03: 82 BPM
VLDLC SERPL CALC-MCNC: 24.4 MG/DL

## 2021-05-14 PROCEDURE — 83036 HEMOGLOBIN GLYCOSYLATED A1C: CPT

## 2021-05-14 PROCEDURE — 93306 TTE W/DOPPLER COMPLETE: CPT

## 2021-05-14 PROCEDURE — 84443 ASSAY THYROID STIM HORMONE: CPT

## 2021-05-14 PROCEDURE — 74011250637 HC RX REV CODE- 250/637: Performed by: NURSE PRACTITIONER

## 2021-05-14 PROCEDURE — 74011250636 HC RX REV CODE- 250/636: Performed by: PSYCHIATRY & NEUROLOGY

## 2021-05-14 PROCEDURE — 74011250637 HC RX REV CODE- 250/637: Performed by: PSYCHIATRY & NEUROLOGY

## 2021-05-14 PROCEDURE — 82962 GLUCOSE BLOOD TEST: CPT

## 2021-05-14 PROCEDURE — 65220000001 HC RM PRIVATE PSYCH

## 2021-05-14 PROCEDURE — 36415 COLL VENOUS BLD VENIPUNCTURE: CPT

## 2021-05-14 PROCEDURE — 80061 LIPID PANEL: CPT

## 2021-05-14 PROCEDURE — 74011250637 HC RX REV CODE- 250/637: Performed by: STUDENT IN AN ORGANIZED HEALTH CARE EDUCATION/TRAINING PROGRAM

## 2021-05-14 PROCEDURE — 84484 ASSAY OF TROPONIN QUANT: CPT

## 2021-05-14 PROCEDURE — 99233 SBSQ HOSP IP/OBS HIGH 50: CPT | Performed by: PSYCHIATRY & NEUROLOGY

## 2021-05-14 RX ORDER — DIVALPROEX SODIUM 500 MG/1
1000 TABLET, EXTENDED RELEASE ORAL
Status: DISCONTINUED | OUTPATIENT
Start: 2021-05-14 | End: 2021-05-18

## 2021-05-14 RX ADMIN — OLANZAPINE 2.5 MG: 2.5 TABLET, FILM COATED ORAL at 00:00

## 2021-05-14 RX ADMIN — ASPIRIN 81 MG CHEWABLE TABLET 81 MG: 81 TABLET CHEWABLE at 09:24

## 2021-05-14 RX ADMIN — ROSUVASTATIN CALCIUM 20 MG: 10 TABLET, FILM COATED ORAL at 22:03

## 2021-05-14 RX ADMIN — PYRIDOSTIGMINE BROMIDE 30 MG: 60 TABLET ORAL at 11:37

## 2021-05-14 RX ADMIN — MAGNESIUM HYDROXIDE 30 ML: 2400 SUSPENSION ORAL at 03:57

## 2021-05-14 RX ADMIN — LISINOPRIL: 20 TABLET ORAL at 09:24

## 2021-05-14 RX ADMIN — TRAZODONE HYDROCHLORIDE 50 MG: 50 TABLET ORAL at 22:03

## 2021-05-14 RX ADMIN — GLIPIZIDE 5 MG: 5 TABLET ORAL at 09:24

## 2021-05-14 RX ADMIN — MYCOPHENOLATE MOFETIL 500 MG: 250 CAPSULE ORAL at 18:00

## 2021-05-14 RX ADMIN — THERA TABS 1 TABLET: TAB at 09:24

## 2021-05-14 RX ADMIN — PYRIDOSTIGMINE BROMIDE 30 MG: 60 TABLET ORAL at 18:01

## 2021-05-14 RX ADMIN — EZETIMIBE 10 MG: 10 TABLET ORAL at 09:25

## 2021-05-14 NOTE — PROGRESS NOTES
1930: Pt in room resting. Pt denied SI/HI/AH/VH. Pt denied pain. PT denied anxiety. Pt denied depression. Pt talked with writer how he was feeling better but still was talking about \"saving the kids\". Pt talked about his health with writer. Writer educated pt regarding pulling labs for his heart. 2114: Pt refused Crestor. Pt accepted PRN trazodone. 2150: Pt accepted labs. 2300: Pt appears asleep, breathing visible. PRN effective. 0000: Pt stated he is having broken sleep due to racing thoughts. PRN zyprexa provided     0115: Pt appears asleep, PRN effective. 0350: Pt accepted labs. No issues at this time. 0700: Gave off shift report to oncoming nurse. Pt slept for 5 hours. Problem: Falls - Risk of  Goal: *Absence of Falls  Description: Document Radha Frye Fall Risk and appropriate interventions in the flowsheet.   Outcome: Progressing Towards Goal  Note: Fall Risk Interventions:            Medication Interventions: Teach patient to arise slowly                   Problem: Manic Behavior (Adult/Pediatric)  Goal: *STG: Participates in treatment plan  Outcome: Progressing Towards Goal  Goal: *STG: Maintains appropriate boundaries  Outcome: Progressing Towards Goal  Goal: *STG/LTG: Complies with medication therapy  Outcome: Progressing Towards Goal     Problem: Altered Thought Process (Adult/Pediatric)  Goal: *STG: Participates in treatment plan  Outcome: Progressing Towards Goal  Goal: *STG: Remains safe in hospital  Outcome: Progressing Towards Goal  Goal: *STG: Complies with medication therapy  Outcome: Progressing Towards Goal  Goal: *STG: Attends activities and groups  Outcome: Progressing Towards Goal  Goal: *STG: Absence of lethality  Outcome: Progressing Towards Goal     Problem: Discharge Planning  Goal: *Discharge to safe environment  Outcome: Progressing Towards Goal

## 2021-05-14 NOTE — PROGRESS NOTES
Discussed with Dr. Dirk Devlin from cardiology  Patient to have stress test on Monday per cardiology consult in place  Medicine will sign off and will be available as needed    Ginny Mclaughlin MD

## 2021-05-14 NOTE — BH NOTES
GROUP THERAPY PROGRESS NOTE    Patient is participating in Discharge Planning Group. Group time: 30 minutes    Personal goal for participation: Process feelings related to discharge and/or feelings/goals for today. Goal orientation: Personal    Group therapy participation: passive    Therapeutic interventions reviewed and discussed: Group discussion was focused on discharge plans and anxiety related to this. Group members discussed what they planned to do once discharge and discharge plans. Patients discussed their goals for today as well as the weekend and what they are working on with treatment team to get ready for discharge. Impression of participation: Andre Paz was present in the dayroom but listened from across the room. He was called out of the dayroom and did not return before group ended.      Brenda Speaks LPC [place] [facility]

## 2021-05-14 NOTE — GROUP NOTE
BRADLY  GROUP DOCUMENTATION INDIVIDUAL Group Therapy Note Date: 5/14/2021 Group Start Time: 0900 Group End Time: 1000 Group Topic: Topic Group [facility] 3 ACUTE BEHAV [facility] [place], [address] GROUP DOCUMENTATION GROUP Group Therapy Note Attendees: 6 Attendance: Did not attend Patient's Goal: Interventions/techniquesMinnie Call

## 2021-05-14 NOTE — GROUP NOTE
BRADLY  GROUP DOCUMENTATION INDIVIDUAL Group Therapy Note Date: 5/14/2021 Group Start Time: 1500 Group End Time: 7538 Group Topic: Recreational/Music Therapy [facility] [place] [address] ACUTE BEHAV [facility] [place], [address] GROUP DOCUMENTATION GROUP Group Therapy Note Attendees: 9 Attendance: Attended Patient's Goal:  To concentrate on selected task Interventions/techniques: Supported-crafts,games,music Follows Directions: Followed directions Interactions: Interacted appropriately Mental Status: Calm Behavior/appearance: Attentive, Cooperative and Needed prompting Goals Achieved: Able to engage in interactions and Able to listen to others Additional Notes:   
 
Senia Corona

## 2021-05-14 NOTE — GROUP NOTE
BRADLY  GROUP DOCUMENTATION INDIVIDUAL Group Therapy Note Date: 5/14/2021 Group Start Time: 1100 Group End Time: 1200 Group Topic: Topic Group [facility] 3 ACUTE BEHAV [facility] [place], [address] GROUP DOCUMENTATION GROUP Group Therapy Note Attendees: 7 Attendance: Attended Patient's Goal:  To participate in relaxation activity Interventions/techniques: Supported-game Follows Directions: Followed directions Interactions: Interacted appropriately Mental Status: Calm Behavior/appearance: Attentive, Cooperative and Needed prompting Goals Achieved: Able to engage in interactions and Able to listen to others Additional Notes: Actively participated in game-pleasant/cooperative Anahy Pena

## 2021-05-14 NOTE — BH NOTES
Behavioral Health Treatment Team Note       Patient goal(s) for today: TDO hearing and take medications as prescribed. Treatment team focus/goals: Monitoring and medication adjustments. Progress note:  Pt was seen in treatment team this morning. Pt is alert and oriented. Pt denies SI/HI. Pt's mood is labile, affect is WNL. Pt's thought process is illogical.  Pt's insight and judgment is poor, reliability is fair. SW updated pt's wife. Pt committed during TDO hearing. Social work department will continue to coordinate discharge plans. LOS:  2  Expected LOS: TBD    Financial concerns/prescription coverage: [employer] Medicare Part A&B  Date of last family contact: Ezra Medina - wife 5/14     Family requesting physician contact today:  Yes - MD notified. Discharge plan: Home  Guns in the home: None involved. Outpatient provider(s): To be linked.      Participating treatment team members: Deborah Cooper MSW and Dr. Kerrie Miller MD.

## 2021-05-14 NOTE — PROGRESS NOTES
Problem: Manic Behavior (Adult/Pediatric)  Goal: *STG: Participates in treatment plan  Outcome: Progressing Towards Goal  Goal: *STG: Maintains appropriate boundaries  Outcome: Progressing Towards Goal  Goal: *STG: Attends activities and groups  Outcome: Progressing Towards Goal  Goal: *STG: Remains safe in hospital  Outcome: Progressing Towards Goal  Goal: *STG/LTG: Complies with medication therapy  Outcome: Progressing Towards Goal       0700: Shift change report given to Tootie VALENTINE (oncoming nurse) by Jacob Grimm (offgoing nurse). Report included the following information SBAR, Kardex, Intake/Output and Recent Results. 5026-9406: Assumed care of patient. Patient is alert and verbal. Patient denied pain, anxiety, depression, SI, HI, and AVH. Patient refused to take Cellcept and Mestinon, stating that his \"outside doctor did not prescribe it. \" Patient meal compliant. 5912-4495: Patient medication compliant. Patient in hallway quiet. No voiced concerns. 9618-1133: Patient to dayroom for lunch. Patient states he is \"excited to sit with new friends\"    9822-5798: Patient down to 1st floor for echo. Patient tolerated well.     7051-9713: Patient medication and meal compliant. Patient intermittently awake in bed and in dayroom sitting quietly. No issues noted at this moment.

## 2021-05-14 NOTE — BH NOTES
[employer] conducted a virtual TDO Hearing this afternoon. The ending result of hearing, patient Committed.

## 2021-05-14 NOTE — BH NOTES
PSYCHIATRIC PROGRESS NOTE         Patient Name  Shawnee Kayser   Date of Birth 1950   [facility] [id number]   Medical Record Number  [id number]      Age  80 y.o. PCP Kelly Tenorio MD   Admit date:  5/12/2021    Room Number  317/01  @ [facility]   Date of Service  5/14/2021         E & M PROGRESS NOTE:         HISTORY       CC:  \"nick\"  HISTORY OF PRESENT ILLNESS/INTERVAL HISTORY:  (reviewed/updated 5/14/2021). per initial evaluation: The patient, Shawnee Kayser, is a 80 y.o. WHITE male with a past psychiatric history significant for major depressive disorder, who presents at this time with complaints of (and/or evidence of) the following emotional symptoms: delusions and nick. Additional symptomatology include poor self care. The above symptoms have been present for 2+ weeks. These symptoms are of moderate to high severity. These symptoms are constant in nature. The patient's condition has been precipitated by psychosocial stressors. Patient's condition made worse by treatment noncompliance. UDS: negative; BAL=0.      The patient is a tangential historian. The patient corroborates the above narrative. The patient contracts for safety on the unit and gives consent for the team to contact collateral. The patient is amenable to initiating treatment while on the unit. On interview, the patient is grossly manic, preoccupied with his son potentially using marijuana and the legality of the substance in general. He speaks at length, and is focused on discharge. He has some insight into how he is presenting as he keeps staying 'this all seems crazy I know.' He at times accusing the staff of holding him illegally, threatens to zion MD for his shelter and does not voice understanding of the TDO process. He is otherwise euphoric with ongoing difficulty to redirect.  After interview, patient returns to treatment team demanding someone call his local political official to help get him out of the hospital. Patient given access to the unit phone to speak with friends and family. 5/14 - patient remains labile but euphoric. He is grossly manic, and slept 5 hours overnight. Patient noted to have an episode of chest pain, but cardiac workup was negative. Patient continues to perseverate on the need to stop the proliferation of marijuana use amongst young people. Patient ambivalent about starting mood stabilizer, stating he will do so only on the advice of his PCP. MD reached out to patient's PCP Dr. Anel Bustos ([phone]) who was in agreement with the plan to start a mood stabilizer. PCP states that patient's wife called him out of concern, followed by the patient who advised the doctor to discount his wife's concern. SIDE EFFECTS: (reviewed/updated 5/14/2021)  None reported or admitted to. ALLERGIES:(reviewed/updated 5/14/2021)  Allergies   Allergen Reactions    Codeine Nausea Only      MEDICATIONS PRIOR TO ADMISSION:(reviewed/updated 5/14/2021)  Medications Prior to Admission   Medication Sig    glipiZIDE SR (GLUCOTROL XL) 5 mg CR tablet Take 5 mg by mouth daily. Indications: type 2 diabetes mellitus    therapeutic multivitamin (THERAGRAN) tablet Take 1 Tab by mouth daily.  predniSONE (DELTASONE) 20 mg tablet Take 20 mg by mouth daily.  escitalopram oxalate (LEXAPRO) 20 mg tablet Take 20 mg by mouth daily. Indications: major depressive disorder    lisinopril-hydroCHLOROthiazide (PRINZIDE, ZESTORETIC) 20-25 mg per tablet Take 1 Tab by mouth daily. Indications: high blood pressure    traZODone (DESYREL) 50 mg tablet Take 50 mg by mouth nightly as needed for Sleep.  ezetimibe (ZETIA) 10 mg tablet Take 10 mg by mouth daily. Indications: excessive fat in the blood    rosuvastatin (CRESTOR) 20 mg tablet Take 20 mg by mouth nightly. Indications: excessive fat in the blood    mycophenolate (CELLCEPT) 500 mg tablet Take 1 Tab by mouth two (2) times a day.  (Patient taking differently: Take 500 mg by mouth two (2) times a day. Indications: myasthenia gravis)    pyridostigmine (MESTINON) 60 mg tablet 1/2 tab three times daily (Patient taking differently: Take 30 mg by mouth three (3) times daily. 1/2 tab three times daily  Indications: myasthenia gravis, a skeletal muscle disorder)      PAST MEDICAL HISTORY: Past medical history from the initial psychiatric evaluation has been reviewed (reviewed/updated 5/14/2021) with no additional updates (I asked patient and no additional past medical history provided). Past Medical History:   Diagnosis Date    Anxiety disorder     Cancer ([facility] [address]     PROSTATE; [facility]    COVID-19 01/01/2021    Diabetes ([email or web address]     type II    GERD (gastroesophageal reflux disease)     Hypertension     Neurological disorder      Past Surgical History:   Procedure Laterality Date    HX OTHER SURGICAL      Horse Accident-surgery to face for fx    HX OTHER SURGICAL Right     Ear Surgery post facial surgery for scar tissue removal    HX UROLOGICAL        SOCIAL HISTORY: Social history from the initial psychiatric evaluation has been reviewed (reviewed/updated 5/14/2021) with no additional updates (I asked patient and no additional social history provided).  Social History     Socioeconomic History    Marital status:      Spouse name: Not on file    Number of children: Not on file    Years of education: Not on file    Highest education level: Not on file   Occupational History    Not on file   Social Needs    Financial resource strain: Not on file    Food insecurity     Worry: Not on file     Inability: Not on file    Transportation needs     Medical: Not on file     Non-medical: Not on file   Tobacco Use    Smoking status: Never Smoker    Smokeless tobacco: Never Used   Substance and Sexual Activity    Alcohol use: Yes     Comment: 1 drink 3x a week    Drug use: No    Sexual activity: Yes     Partners: Female   Lifestyle    Physical activity     Days per week: Not on file     Minutes per session: Not on file    Stress: Not on file   Relationships    Social connections     Talks on phone: Not on file     Gets together: Not on file     Attends [employer] service: Not on file     Active member of club or organization: Not on file     Attends meetings of clubs or organizations: Not on file     Relationship status: Not on file    Intimate partner violence     Fear of current or ex partner: Not on file     Emotionally abused: Not on file     Physically abused: Not on file     Forced sexual activity: Not on file   Other Topics Concern     Service Not Asked    Blood Transfusions Not Asked    Caffeine Concern Not Asked    Occupational Exposure Not Asked   Nii [employer]ter Hazards Not Asked    Sleep Concern Not Asked    Stress Concern Not Asked    Weight Concern Not Asked    Special Diet Not Asked    Back Care Not Asked    Exercise Not Asked    Bike Helmet Not Asked   [address] Not Asked    Self-Exams Not Asked   Social History Narrative    Not on file      FAMILY HISTORY: Family history from the initial psychiatric evaluation has been reviewed (reviewed/updated 5/14/2021) with no additional updates (I asked patient and no additional family history provided).  Family History   Problem Relation Age of Onset    Cancer Brother         LUNG; THROAT    Anesth Problems Neg Hx        REVIEW OF SYSTEMS: (reviewed/updated 5/14/2021)  Appetite:no change from normal   Sleep: poor with DIMS (difficulty initiating & maintaining sleep)   All other Review of Systems: Negative except per HPI         [facility] (MSE):    MSE FINDINGS ARE WITHIN NORMAL LIMITS (WNL) UNLESS OTHERWISE STATED BELOW. ( ALL OF THE BELOW CATEGORIES OF THE MSE HAVE BEEN REVIEWED (reviewed 5/14/2021) AND UPDATED AS DEEMED APPROPRIATE )  General Presentation age appropriate, cooperative   Orientation oriented to time, place and person   Vital Signs  See below (reviewed 5/14/2021); Vital Signs (BP, Pulse, & Temp) are within normal limits if not listed below.    Gait and Station Stable/steady, no ataxia   Musculoskeletal System No extrapyramidal symptoms (EPS); no abnormal muscular movements or Tardive Dyskinesia (TD); muscle strength and tone are within normal limits   Language No aphasia or dysarthria   Speech:  hyperverbal and pressured   Thought Processes illogical; normal rate of thoughts; fair abstract reasoning/computation   Thought Associations tangential   Thought Content paranoid delusions and preoccupations   Suicidal Ideations none   Homicidal Ideations none   Mood:  euphoric and labile    Affect:  full range and increased in intensity   Memory recent  intact   Memory remote:  intact   Concentration/Attention:  intact   Fund of Knowledge average   Insight:  poor   Reliability fair   Judgment:  poor          VITALS:     Patient Vitals for the past 24 hrs:   Temp Pulse Resp BP SpO2   05/14/21 0750 97.5 °F (36.4 °C) 99 20 115/74 99 %   05/13/21 1930 97.2 °F (36.2 °C) 98 18 125/66 100 %     Wt Readings from Last 3 Encounters:   05/12/21 71.4 kg (157 lb 4.8 oz)   05/12/21 72.6 kg (160 lb)   12/07/20 79.4 kg (175 lb)     Temp Readings from Last 3 Encounters:   05/14/21 97.5 °F (36.4 °C)   05/12/21 98.8 °F (37.1 °C)   07/20/20 98.3 °F (36.8 °C)     BP Readings from Last 3 Encounters:   05/14/21 115/74   05/12/21 (!) 142/100   04/21/21 (!) 154/84     Pulse Readings from Last 3 Encounters:   05/14/21 99   05/12/21 94   04/21/21 76            DATA     LABORATORY DATA:(reviewed/updated 5/14/2021)  Recent Results (from the past 24 hour(s))   TROPONIN I    Collection Time: 05/13/21  9:53 PM   Result Value Ref Range    Troponin-I, Qt. <0.05 <0.05 ng/mL   TSH 3RD GENERATION    Collection Time: 05/14/21  3:38 AM   Result Value Ref Range    TSH 0.97 0.36 - 3.74 uIU/mL   LIPID PANEL    Collection Time: 05/14/21  3:38 AM   Result Value Ref Range    Cholesterol, total 147 <200 MG/DL Triglyceride 122 <150 MG/DL    HDL Cholesterol 46 MG/DL    LDL, calculated 76.6 0 - 100 MG/DL    VLDL, calculated 24.4 MG/DL    CHOL/HDL Ratio 3.2 0.0 - 5.0     HEMOGLOBIN A1C WITH EAG    Collection Time: 05/14/21  3:38 AM   Result Value Ref Range    Hemoglobin A1c 5.8 (H) 4.0 - 5.6 %    Est. average glucose 120 mg/dL   TROPONIN I    Collection Time: 05/14/21  3:38 AM   Result Value Ref Range    Troponin-I, Qt. <0.05 <0.05 ng/mL   GLUCOSE, POC    Collection Time: 05/14/21  9:15 AM   Result Value Ref Range    Glucose (POC) 185 (H) 65 - 117 mg/dL    Performed by Savage Sarabia    ECHO ADULT COMPLETE    Collection Time: 05/14/21  3:23 PM   Result Value Ref Range    IVSd 1.37 (A) 0.60 - 1.00 cm    LVIDd 3.58 (A) 4.20 - 5.90 cm    LVIDs 2.01 cm    LVOT d 1.81 cm    LVPWd 0.96 0.60 - 1.00 cm    LV Ejection Fraction MOD 4C 83 percent    LV ED Vol A4C 79.21 mL    LV ES Vol A4C 13.16 mL    LVOT Peak Gradient 6.96 mmHg    LVOT Peak Velocity 131.87 cm/s    RVSP 25.80 mmHg    Left Atrium Major Axis 2.98 cm    LA Area 4C 11.88 cm2    LA Vol 4C 24.69 18.00 - 58.00 mL    Right Atrial Area 4C 13.49 cm2    Est. RA Pressure 5.00 mmHg    Aortic Valve Area by Planimetry 2.84 cm2    Mitral Valve Area by Planimetry 5.29 cm2    MV A Chet 55.09 cm/s    Mitral Valve E Wave Deceleration Time 130.79 ms    MV E Chet 28.22 cm/s    Mitral Valve Pressure Half-time 37.93 ms    MVA (PHT) 5.80 cm2    MV Peak Gradient 1.99 mmHg    MV Mean Gradient 0.88 mmHg    Mitral Valve Pressure Half-time 45.24 ms    Mitral Valve Max Velocity 70.51 cm/s    Mitral Valve Annulus Velocity Time Integral 12.68 cm    MVA (PHT) 4.86 cm2    Pulmonic Valve Systolic Peak Instantaneous Gradient 3.46 mmHg    Pulmonic Regurgitant End Max Velocity 92.94 cm/s    TR Max Velocity 197.00 cm/s    Triscuspid Valve Regurgitation Peak Gradient 20.80 mmHg    TR Max Velocity 227.93 cm/s    Ao Root D 2.35 cm    MV E/A 0.51     LV Mass .2 88.0 - 224.0 g    LV Mass AL Index 75.1 49.0 - 115.0 g/m2    Left Atrium Minor Axis 1.67 cm    LA Vol Index 13.82 16.00 - 28.00 ml/m2    LVED Vol Index A4C 44.3 mL/m2    LVES Vol Index A4C 7.4 mL/m2     No results found for: VALF2, VALAC, VALP, VALPR, DS6, CRBAM, CRBAMP, CARB2, XCRBAM  No results found for: LITHM   RADIOLOGY REPORTS:(reviewed/updated 5/14/2021)  Ct Head Wo Cont    Result Date: 5/12/2021  INDICATION: Altered mental status Exam: Noncontrast CT of the brain is performed with 5 mm collimation. CT dose reduction was achieved with the use of the standardized protocol tailored for this examination and automatic exposure control for dose modulation. Direct comparison is made to prior MR dated 10/2019. FINDINGS: There is no acute intracranial hemorrhage, mass, mass effect or herniation. Ventricular system is normal. The gray-white matter differentiation is well-preserved. The mastoid air cells are well pneumatized. No acute intracranial hemorrhage, mass or infarct. Xr Chest Port    Result Date: 5/13/2021  EXAM:  XR CHEST PORT INDICATION:  chest pain COMPARISON:  2019 FINDINGS: A portable AP radiograph of the chest was obtained at 1643 hours. . Lungs are clear of an acute process. There is question of a small nodule right midlung. .  The cardiac and mediastinal contours and pulmonary vascularity are normal.  There is degenerative spurring mid lower thoracic spine. 1. Lungs are clear of an acute process. 2. There is question of a nodule in the right midlung versus a nipple shadow. PA and lateral views with nipple markers is recommended for further assessment. Echo Adult Complete    Result Date: 5/14/2021  · LV: Estimated LVEF is 60 - 65%. Visually measured ejection fraction. Normal cavity size, wall thickness and systolic function (ejection fraction normal).  Wall motion: normal.           MEDICATIONS     ALL MEDICATIONS:   Current Facility-Administered Medications   Medication Dose Route Frequency    divalproex ER (DEPAKOTE ER) 24 hour tablet 1,000 mg  1,000 mg Oral QHS    ezetimibe (ZETIA) tablet 10 mg  10 mg Oral DAILY    glipiZIDE (GLUCOTROL) tablet 5 mg  5 mg Oral DAILY    mycophenolate mofetil (CELLCEPT) capsule 500 mg  500 mg Oral BID    pyridostigmine (MESTINON) tablet 30 mg  30 mg Oral TID    rosuvastatin (CRESTOR) tablet 20 mg  20 mg Oral QHS    therapeutic multivitamin (THERAGRAN) tablet 1 Tab  1 Tab Oral DAILY    lisinopril/hydroCHLOROthiazide(PRINZIDE/ZESTORETIC) 20/25 mg   Oral DAILY    aspirin chewable tablet 81 mg  81 mg Oral DAILY    OLANZapine (ZyPREXA) tablet 2.5 mg  2.5 mg Oral Q6H PRN    haloperidol lactate (HALDOL) injection 2.5 mg  2.5 mg IntraMUSCular Q6H PRN    benztropine (COGENTIN) tablet 0.5 mg  0.5 mg Oral BID PRN    diphenhydrAMINE (BENADRYL) injection 25 mg  25 mg IntraMUSCular BID PRN    acetaminophen (TYLENOL) tablet 650 mg  650 mg Oral Q4H PRN    magnesium hydroxide (MILK OF MAGNESIA) 400 mg/5 mL oral suspension 30 mL  30 mL Oral DAILY PRN    cloNIDine HCL (CATAPRES) tablet 0.1 mg  0.1 mg Oral Q4H PRN    traZODone (DESYREL) tablet 50 mg  50 mg Oral QHS PRN      SCHEDULED MEDICATIONS:   Current Facility-Administered Medications   Medication Dose Route Frequency    divalproex ER (DEPAKOTE ER) 24 hour tablet 1,000 mg  1,000 mg Oral QHS    ezetimibe (ZETIA) tablet 10 mg  10 mg Oral DAILY    glipiZIDE (GLUCOTROL) tablet 5 mg  5 mg Oral DAILY    mycophenolate mofetil (CELLCEPT) capsule 500 mg  500 mg Oral BID    pyridostigmine (MESTINON) tablet 30 mg  30 mg Oral TID    rosuvastatin (CRESTOR) tablet 20 mg  20 mg Oral QHS    therapeutic multivitamin (THERAGRAN) tablet 1 Tab  1 Tab Oral DAILY    lisinopril/hydroCHLOROthiazide(PRINZIDE/ZESTORETIC) 20/25 mg   Oral DAILY    aspirin chewable tablet 81 mg  81 mg Oral DAILY          ASSESSMENT & PLAN     DIAGNOSES REQUIRING ACTIVE TREATMENT AND MONITORING: (reviewed/updated 5/14/2021)  Patient Active Hospital Problem List:   Sarah    Assessment: patient floridly manic, preoccupied with his children using drugs amongst other ongoing preoccupations. Per collateral, he has decompensated in the community, inappropriate behaviors have been noted at various public places. CT WNL, labwork unremarkable. Diff Dx includes neuropsychiatric sequelae of COVID-19 / malignancy, or steroid / SSRI induced sarah. Will observe for now, obtain further information regarding psychiatric history, consider mood stabilizer. Plan:  - Observation  - START VPA 1000 mg QHS for sarah  - IGM therapy as tolerated  - Expand database / obtain collateral  - Dispo planning (home)     I will continue to monitor blood levels (Depakote---a drug with a narrow therapeutic index= NTI) and associated labs for drug therapy implemented that require intense monitoring for toxicity as deemed appropriate based on current medication side effects and pharmacodynamically determined drug 1/2 lives. In summary, Jennifer Castanon, is a 80 y.o.  male who presents with a severe exacerbation of the principal diagnosis of Sraah ([address]    Patient's condition is not improving. Patient requires continued inpatient hospitalization for further stabilization, safety monitoring and medication management. I will continue to coordinate the provision of individual, milieu, occupational, group, and substance abuse therapies to address target symptoms/diagnoses as deemed appropriate for the individual patient. A coordinated, multidisplinary treatment team round was conducted with the patient (this team consists of the nurse, psychiatric unit pharmacist,  and writer). Complete current electronic health record for patient has been reviewed today including consultant notes, ancillary staff notes, nurses and psychiatric tech notes. Suicide risk assessment completed and patient deemed to be of low risk for suicide at this time.      The following regarding medications was addressed during rounds with patient: the risks and benefits of the proposed medication. The patient was given the opportunity to ask questions. Informed consent given to the use of the above medications. Will continue to adjust psychiatric and non-psychiatric medications (see above \"medication\" section and orders section for details) as deemed appropriate & based upon diagnoses and response to treatment. I will continue to order blood tests/labs and diagnostic tests as deemed appropriate and review results as they become available (see orders for details and above listed lab/test results). I will order psychiatric records from previous [facility] to further elucidate the nature of patient's psychopathology and review once available. I will gather additional collateral information from friends, family and o/p treatment team to further elucidate the nature of patient's psychopathology and baselline level of psychiatric functioning. I certify that this patient's inpatient psychiatric hospital services furnished since the previous certification were, and continue to be, required for treatment that could reasonably be expected to improve the patient's condition, or for diagnostic study, and that the patient continues to need, on a daily basis, active treatment furnished directly by or requiring the supervision of inpatient psychiatric facility personnel. In addition, the hospital records show that services furnished were intensive treatment services, admission or related services, or equivalent services.     EXPECTED DISCHARGE DATE/DAY: TBD     DISPOSITION: Home       Signed By:   Yun Melendez MD  5/14/2021

## 2021-05-15 LAB
GLUCOSE BLD STRIP.AUTO-MCNC: 154 MG/DL (ref 65–117)
SERVICE CMNT-IMP: ABNORMAL

## 2021-05-15 PROCEDURE — 74011250637 HC RX REV CODE- 250/637: Performed by: NURSE PRACTITIONER

## 2021-05-15 PROCEDURE — 74011250636 HC RX REV CODE- 250/636: Performed by: PSYCHIATRY & NEUROLOGY

## 2021-05-15 PROCEDURE — 74011250637 HC RX REV CODE- 250/637: Performed by: STUDENT IN AN ORGANIZED HEALTH CARE EDUCATION/TRAINING PROGRAM

## 2021-05-15 PROCEDURE — 82962 GLUCOSE BLOOD TEST: CPT

## 2021-05-15 PROCEDURE — 74011250637 HC RX REV CODE- 250/637: Performed by: PSYCHIATRY & NEUROLOGY

## 2021-05-15 PROCEDURE — 65220000001 HC RM PRIVATE PSYCH

## 2021-05-15 RX ADMIN — PYRIDOSTIGMINE BROMIDE 30 MG: 60 TABLET ORAL at 08:57

## 2021-05-15 RX ADMIN — ROSUVASTATIN CALCIUM 20 MG: 10 TABLET, FILM COATED ORAL at 22:17

## 2021-05-15 RX ADMIN — PYRIDOSTIGMINE BROMIDE 30 MG: 60 TABLET ORAL at 18:57

## 2021-05-15 RX ADMIN — LISINOPRIL: 20 TABLET ORAL at 08:45

## 2021-05-15 RX ADMIN — THERA TABS 1 TABLET: TAB at 08:45

## 2021-05-15 RX ADMIN — MYCOPHENOLATE MOFETIL 500 MG: 250 CAPSULE ORAL at 08:57

## 2021-05-15 RX ADMIN — ASPIRIN 81 MG CHEWABLE TABLET 81 MG: 81 TABLET CHEWABLE at 08:45

## 2021-05-15 RX ADMIN — GLIPIZIDE 5 MG: 5 TABLET ORAL at 08:45

## 2021-05-15 RX ADMIN — PYRIDOSTIGMINE BROMIDE 30 MG: 60 TABLET ORAL at 12:35

## 2021-05-15 RX ADMIN — MYCOPHENOLATE MOFETIL 500 MG: 250 CAPSULE ORAL at 16:43

## 2021-05-15 RX ADMIN — DIVALPROEX SODIUM 1000 MG: 500 TABLET, EXTENDED RELEASE ORAL at 22:17

## 2021-05-15 RX ADMIN — OLANZAPINE 2.5 MG: 2.5 TABLET, FILM COATED ORAL at 10:37

## 2021-05-15 RX ADMIN — EZETIMIBE 10 MG: 10 TABLET ORAL at 08:45

## 2021-05-15 NOTE — PROGRESS NOTES
2000 - assessment completed. Pt very cooperative and engaged in lengthy conversation w/ this writer. Bright affect, pleasant mood, denies SI/HI/AH/VH. Pt states he shouldn't be here but will make the best of it while he's here. Admits he got \"worked up\" when he was irritated with his son prior to admission. States his wife and son were worried about him, claiming he was acting bizarre. Pt denies Hx of inpatient admissions. States he doesn't have depression either. Does acknowledge that his 26 yo son and 22 yo daughter have been causing him increased stress lately. 2203 - refused to take Depakote. States he doesn't need meds for his mood.    Requested Trazadone for insomnia which was given to pt.     0700 - slept 8.5 hrs

## 2021-05-15 NOTE — PROGRESS NOTES
Problem: Falls - Risk of  Goal: *Absence of Falls  Description: Document Mirta Cervantes Fall Risk and appropriate interventions in the flowsheet.   Outcome: Progressing Towards Goal  Note: Fall Risk Interventions:    Medication Interventions: Teach patient to arise slowly    Problem: Manic Behavior (Adult/Pediatric)  Goal: *STG: Participates in treatment plan  Outcome: Progressing Towards Goal  Goal: *STG: Maintains appropriate boundaries  Outcome: Progressing Towards Goal  Goal: *STG: Remains safe in hospital  Outcome: Progressing Towards Goal  Goal: *STG/LTG: Complies with medication therapy  Outcome: Progressing Towards Goal

## 2021-05-15 NOTE — BH NOTES
GROUP THERAPY PROGRESS NOTE    Patient is participating in Coping Skills group. Group time: 1 hour    Personal goal for participation: Learn coping skills to improve mental health, reduce stress and work through uncomfortable emotions    Goal orientation: Personal    Group therapy participation: active, disruptive    Therapeutic interventions reviewed and discussed: Group discussion on ways patients are coping with mental health needs, stress and uncomfortable emotions. Discussion regarding alternative positive coping skills using each letter of the alphabet, resulting in patients having a list of 26+ positive coping skills to access. Impression of participation: Pt labile, hyperverbal, pressured speech, tangential. SW needed to redirect pt numerous times. Pt intrusive to others at times. Pt discussing his desire for everyone to overcome challenges, religiously preoccupied at times. Pt asked to open up the support group in prayer.      AJ Dawkins

## 2021-05-15 NOTE — BH NOTES
Pt's wife dropped off 3 shirts and 3 pants 3 books, and a letter with pitres in it. Stored in back closet.

## 2021-05-15 NOTE — BH NOTES
GROUP THERAPY PROGRESS NOTE     Patient is participating in Treatment Team Group. Group time: 60 minutes     Personal goal for participation: To participate in treatment plan and discharge      Goal orientation: Personal     Group therapy participation: active      Therapeutic interventions reviewed and discussed: Group members met with their treatment team individually and discussed their treatment plan, progress and concerns. Each patient was given the opportunity to ask questions related to their discharge and/or medications. Impression of participation: Patient participated in treatment team, presented with anxious mood, hyperverbal at times. Patient engaged in conversation and discussion. Patient asked questions about treatment plan and discharge.      AJ Beth

## 2021-05-15 NOTE — BH NOTES
PSYCHIATRIC PROGRESS NOTE         Patient Name  Jason Farah   Date of Birth 1950   [facility] [id number]   Medical Record Number  [id number]      Age  80 y.o. PCP Ariana Dai MD   Admit date:  5/12/2021    Room Number  317/01  @ [facility]   Date of Service  5/15/2021         E & M PROGRESS NOTE:         HISTORY       CC:  \"nick\"  HISTORY OF PRESENT ILLNESS/INTERVAL HISTORY:  (reviewed/updated 5/15/2021). per initial evaluation: The patient, Jason Farah, is a 80 y.o. WHITE male with a past psychiatric history significant for major depressive disorder, who presents at this time with complaints of (and/or evidence of) the following emotional symptoms: delusions and nick. Additional symptomatology include poor self care. The above symptoms have been present for 2+ weeks. These symptoms are of moderate to high severity. These symptoms are constant in nature. The patient's condition has been precipitated by psychosocial stressors. Patient's condition made worse by treatment noncompliance. UDS: negative; BAL=0.      The patient is a tangential historian. The patient corroborates the above narrative. The patient contracts for safety on the unit and gives consent for the team to contact collateral. The patient is amenable to initiating treatment while on the unit. On interview, the patient is grossly manic, preoccupied with his son potentially using marijuana and the legality of the substance in general. He speaks at length, and is focused on discharge. He has some insight into how he is presenting as he keeps staying 'this all seems crazy I know.' He at times accusing the staff of holding him illegally, threatens to zion MD for his half-way and does not voice understanding of the TDO process. He is otherwise euphoric with ongoing difficulty to redirect.  After interview, patient returns to treatment team demanding someone call his local political official to help get him out of the hospital. Patient given access to the unit phone to speak with friends and family. 5/14 - patient remains labile but euphoric. He is grossly manic, and slept 5 hours overnight. Patient noted to have an episode of chest pain, but cardiac workup was negative. Patient continues to perseverate on the need to stop the proliferation of marijuana use amongst young people. Patient ambivalent about starting mood stabilizer, stating he will do so only on the advice of his PCP. MD reached out to patient's PCP Dr. Tess Whitley ([phone]) who was in agreement with the plan to start a mood stabilizer. PCP states that patient's wife called him out of concern, followed by the patient who advised the doctor to discount his wife's concern. 5/15 - \"You're as ugly as homemade sin. \" Elena Shah reports feeling fine today. He has been refusing medications and has a very limited insight. He slept 8 1/2 hours. He is disorganized and irritable on interview. SIDE EFFECTS: (reviewed/updated 5/15/2021)  None reported or admitted to. ALLERGIES:(reviewed/updated 5/15/2021)  Allergies   Allergen Reactions    Codeine Nausea Only      MEDICATIONS PRIOR TO ADMISSION:(reviewed/updated 5/15/2021)  Medications Prior to Admission   Medication Sig    glipiZIDE SR (GLUCOTROL XL) 5 mg CR tablet Take 5 mg by mouth daily. Indications: type 2 diabetes mellitus    therapeutic multivitamin (THERAGRAN) tablet Take 1 Tab by mouth daily.  predniSONE (DELTASONE) 20 mg tablet Take 20 mg by mouth daily.  escitalopram oxalate (LEXAPRO) 20 mg tablet Take 20 mg by mouth daily. Indications: major depressive disorder    lisinopril-hydroCHLOROthiazide (PRINZIDE, ZESTORETIC) 20-25 mg per tablet Take 1 Tab by mouth daily. Indications: high blood pressure    traZODone (DESYREL) 50 mg tablet Take 50 mg by mouth nightly as needed for Sleep.  ezetimibe (ZETIA) 10 mg tablet Take 10 mg by mouth daily.  Indications: excessive fat in the blood    rosuvastatin (CRESTOR) 20 mg tablet Take 20 mg by mouth nightly. Indications: excessive fat in the blood    mycophenolate (CELLCEPT) 500 mg tablet Take 1 Tab by mouth two (2) times a day. (Patient taking differently: Take 500 mg by mouth two (2) times a day. Indications: myasthenia gravis)    pyridostigmine (MESTINON) 60 mg tablet 1/2 tab three times daily (Patient taking differently: Take 30 mg by mouth three (3) times daily. 1/2 tab three times daily  Indications: myasthenia gravis, a skeletal muscle disorder)      PAST MEDICAL HISTORY: Past medical history from the initial psychiatric evaluation has been reviewed (reviewed/updated 5/15/2021) with no additional updates (I asked patient and no additional past medical history provided). Past Medical History:   Diagnosis Date    Anxiety disorder     Cancer ([facility] [address]     PROSTATE; [facility]    COVID-19 01/01/2021    [facility]     type II    GERD (gastroesophageal reflux disease)     Hypertension     Neurological disorder      Past Surgical History:   Procedure Laterality Date    HX OTHER SURGICAL      Horse Accident-surgery to face for fx    HX OTHER SURGICAL Right     Ear Surgery post facial surgery for scar tissue removal    HX UROLOGICAL        SOCIAL HISTORY: Social history from the initial psychiatric evaluation has been reviewed (reviewed/updated 5/15/2021) with no additional updates (I asked patient and no additional social history provided).    Social History     Socioeconomic History    Marital status:      Spouse name: Not on file    Number of children: Not on file    Years of education: Not on file    Highest education level: Not on file   Occupational History    Not on file   Social Needs    Financial resource strain: Not on file    Food insecurity     Worry: Not on file     Inability: Not on file    Transportation needs     Medical: Not on file     Non-medical: Not on file   Tobacco Use    Smoking status: Never Smoker    Smokeless tobacco: Never Used   Substance and Sexual Activity    Alcohol use: Yes     Comment: 1 drink 3x a week    Drug use: No    Sexual activity: Yes     Partners: Female   Lifestyle    Physical activity     Days per week: Not on file     Minutes per session: Not on file    Stress: Not on file   Relationships    Social connections     Talks on phone: Not on file     Gets together: Not on file     Attends Mu-ism service: Not on file     Active member of club or organization: Not on file     Attends meetings of clubs or organizations: Not on file     Relationship status: Not on file    Intimate partner violence     Fear of current or ex partner: Not on file     Emotionally abused: Not on file     Physically abused: Not on file     Forced sexual activity: Not on file   Other Topics Concern     Service Not Asked    Blood Transfusions Not Asked    Caffeine Concern Not Asked    Occupational Exposure Not Asked   Trung Donning Hazards Not Asked    Sleep Concern Not Asked    Stress Concern Not Asked    Weight Concern Not Asked    Special Diet Not Asked    Back Care Not Asked    Exercise Not Asked    Bike Helmet Not Asked   [address] Not Asked    Self-Exams Not Asked   Social History Narrative    Not on file      FAMILY HISTORY: Family history from the initial psychiatric evaluation has been reviewed (reviewed/updated 5/15/2021) with no additional updates (I asked patient and no additional family history provided).    Family History   Problem Relation Age of Onset    Cancer Brother         LUNG; THROAT    Anesth Problems Neg Hx        REVIEW OF SYSTEMS: (reviewed/updated 5/15/2021)  Appetite:no change from normal   Sleep: poor with DIMS (difficulty initiating & maintaining sleep)   All other Review of Systems: Negative except per HPI         [facility] (MSE):    MSE FINDINGS ARE WITHIN NORMAL LIMITS (WNL) UNLESS OTHERWISE STATED BELOW. ( ALL OF THE BELOW CATEGORIES OF THE MSE HAVE BEEN REVIEWED (reviewed 5/15/2021) AND UPDATED AS DEEMED APPROPRIATE )  General Presentation age appropriate, cooperative   Orientation oriented to time, place and person   Vital Signs  See below (reviewed 5/15/2021); Vital Signs (BP, Pulse, & Temp) are within normal limits if not listed below.    Gait and Station Stable/steady, no ataxia   Musculoskeletal System No extrapyramidal symptoms (EPS); no abnormal muscular movements or Tardive Dyskinesia (TD); muscle strength and tone are within normal limits   Language No aphasia or dysarthria   Speech:  hyperverbal and pressured   Thought Processes illogical; normal rate of thoughts; fair abstract reasoning/computation   Thought Associations tangential   Thought Content paranoid delusions and preoccupations   Suicidal Ideations none   Homicidal Ideations none   Mood:  euphoric and labile    Affect:  full range and increased in intensity   Memory recent  intact   Memory remote:  intact   Concentration/Attention:  intact   Fund of Knowledge average   Insight:  poor   Reliability fair   Judgment:  poor          VITALS:     Patient Vitals for the past 24 hrs:   Temp Pulse Resp BP SpO2   05/15/21 1245 98 °F (36.7 °C) 100 18 (!) 138/104 100 %   05/14/21 2013 98 °F (36.7 °C) 90 18 100/66 99 %     Wt Readings from Last 3 Encounters:   05/12/21 71.4 kg (157 lb 4.8 oz)   05/12/21 72.6 kg (160 lb)   12/07/20 79.4 kg (175 lb)     Temp Readings from Last 3 Encounters:   05/15/21 98 °F (36.7 °C)   05/12/21 98.8 °F (37.1 °C)   07/20/20 98.3 °F (36.8 °C)     BP Readings from Last 3 Encounters:   05/15/21 (!) 138/104   05/12/21 (!) 142/100   04/21/21 (!) 154/84     Pulse Readings from Last 3 Encounters:   05/15/21 100   05/12/21 94   04/21/21 76            DATA     LABORATORY DATA:(reviewed/updated 5/15/2021)  Recent Results (from the past 24 hour(s))   ECHO ADULT COMPLETE    Collection Time: 05/14/21  3:23 PM   Result Value Ref Range    IVSd 1.37 (A) 0.60 - 1.00 cm    LVIDd 3.58 (A) 4.20 - 5.90 cm    LVIDs 2.01 cm    LVOT d 1.81 cm    LVPWd 0.96 0.60 - 1.00 cm    LV Ejection Fraction MOD 4C 83 percent    LV ED Vol A4C 79.21 mL    LV ES Vol A4C 13.16 mL    LVOT Peak Gradient 6.96 mmHg    LVOT Peak Velocity 131.87 cm/s    RVSP 25.80 mmHg    Left Atrium Major Axis 2.98 cm    LA Area 4C 11.88 cm2    LA Vol 4C 24.69 18.00 - 58.00 mL    Right Atrial Area 4C 13.49 cm2    Est. RA Pressure 5.00 mmHg    Aortic Valve Area by Planimetry 2.84 cm2    Mitral Valve Area by Planimetry 5.29 cm2    MV A Chet 55.09 cm/s    Mitral Valve E Wave Deceleration Time 130.79 ms    MV E Chet 28.22 cm/s    Mitral Valve Pressure Half-time 37.93 ms    MVA (PHT) 5.80 cm2    MV Peak Gradient 1.99 mmHg    MV Mean Gradient 0.88 mmHg    Mitral Valve Pressure Half-time 45.24 ms    Mitral Valve Max Velocity 70.51 cm/s    Mitral Valve Annulus Velocity Time Integral 12.68 cm    MVA (PHT) 4.86 cm2    Pulmonic Valve Systolic Peak Instantaneous Gradient 3.46 mmHg    Pulmonic Regurgitant End Max Velocity 92.94 cm/s    TR Max Velocity 197.00 cm/s    Triscuspid Valve Regurgitation Peak Gradient 20.80 mmHg    TR Max Velocity 227.93 cm/s    Ao Root D 2.35 cm    MV E/A 0.51     LV Mass .2 88.0 - 224.0 g    LV Mass AL Index 75.1 49.0 - 115.0 g/m2    Left Atrium Minor Axis 1.67 cm    LA Vol Index 13.82 16.00 - 28.00 ml/m2    LVED Vol Index A4C 44.3 mL/m2    LVES Vol Index A4C 7.4 mL/m2     No results found for: VALF2, VALAC, VALP, VALPR, DS6, CRBAM, CRBAMP, CARB2, XCRBAM  No results found for: LITHM   RADIOLOGY REPORTS:(reviewed/updated 5/15/2021)  Ct Head Wo Cont    Result Date: 5/12/2021  INDICATION: Altered mental status Exam: Noncontrast CT of the brain is performed with 5 mm collimation. CT dose reduction was achieved with the use of the standardized protocol tailored for this examination and automatic exposure control for dose modulation. Direct comparison is made to prior MR dated 10/2019.  FINDINGS: There is no acute intracranial hemorrhage, mass, mass effect or herniation. Ventricular system is normal. The gray-white matter differentiation is well-preserved. The mastoid air cells are well pneumatized. No acute intracranial hemorrhage, mass or infarct. Xr Chest Port    Result Date: 5/13/2021  EXAM:  XR CHEST PORT INDICATION:  chest pain COMPARISON:  2019 FINDINGS: A portable AP radiograph of the chest was obtained at 1643 hours. . Lungs are clear of an acute process. There is question of a small nodule right midlung. .  The cardiac and mediastinal contours and pulmonary vascularity are normal.  There is degenerative spurring mid lower thoracic spine. 1. Lungs are clear of an acute process. 2. There is question of a nodule in the right midlung versus a nipple shadow. PA and lateral views with nipple markers is recommended for further assessment. Echo Adult Complete    Result Date: 5/14/2021  · LV: Estimated LVEF is 60 - 65%. Visually measured ejection fraction. Normal cavity size, wall thickness and systolic function (ejection fraction normal).  Wall motion: normal.           MEDICATIONS     ALL MEDICATIONS:   Current Facility-Administered Medications   Medication Dose Route Frequency    divalproex ER (DEPAKOTE ER) 24 hour tablet 1,000 mg  1,000 mg Oral QHS    ezetimibe (ZETIA) tablet 10 mg  10 mg Oral DAILY    glipiZIDE (GLUCOTROL) tablet 5 mg  5 mg Oral DAILY    mycophenolate mofetil (CELLCEPT) capsule 500 mg  500 mg Oral BID    pyridostigmine (MESTINON) tablet 30 mg  30 mg Oral TID    rosuvastatin (CRESTOR) tablet 20 mg  20 mg Oral QHS    therapeutic multivitamin (THERAGRAN) tablet 1 Tab  1 Tab Oral DAILY    lisinopril/hydroCHLOROthiazide(PRINZIDE/ZESTORETIC) 20/25 mg   Oral DAILY    aspirin chewable tablet 81 mg  81 mg Oral DAILY    OLANZapine (ZyPREXA) tablet 2.5 mg  2.5 mg Oral Q6H PRN    haloperidol lactate (HALDOL) injection 2.5 mg  2.5 mg IntraMUSCular Q6H PRN    benztropine (COGENTIN) tablet 0.5 mg  0.5 mg Oral BID PRN    diphenhydrAMINE (BENADRYL) injection 25 mg  25 mg IntraMUSCular BID PRN    acetaminophen (TYLENOL) tablet 650 mg  650 mg Oral Q4H PRN    magnesium hydroxide (MILK OF MAGNESIA) 400 mg/5 mL oral suspension 30 mL  30 mL Oral DAILY PRN    cloNIDine HCL (CATAPRES) tablet 0.1 mg  0.1 mg Oral Q4H PRN    traZODone (DESYREL) tablet 50 mg  50 mg Oral QHS PRN      SCHEDULED MEDICATIONS:   Current Facility-Administered Medications   Medication Dose Route Frequency    divalproex ER (DEPAKOTE ER) 24 hour tablet 1,000 mg  1,000 mg Oral QHS    ezetimibe (ZETIA) tablet 10 mg  10 mg Oral DAILY    glipiZIDE (GLUCOTROL) tablet 5 mg  5 mg Oral DAILY    mycophenolate mofetil (CELLCEPT) capsule 500 mg  500 mg Oral BID    pyridostigmine (MESTINON) tablet 30 mg  30 mg Oral TID    rosuvastatin (CRESTOR) tablet 20 mg  20 mg Oral QHS    therapeutic multivitamin (THERAGRAN) tablet 1 Tab  1 Tab Oral DAILY    lisinopril/hydroCHLOROthiazide(PRINZIDE/ZESTORETIC) 20/25 mg   Oral DAILY    aspirin chewable tablet 81 mg  81 mg Oral DAILY          ASSESSMENT & PLAN     DIAGNOSES REQUIRING ACTIVE TREATMENT AND MONITORING: (reviewed/updated 5/15/2021)  Patient Active Hospital Problem List:   Sarah    Assessment: patient floridly manic, preoccupied with his children using drugs amongst other ongoing preoccupations. Per collateral, he has decompensated in the community, inappropriate behaviors have been noted at various public places. CT WNL, labwork unremarkable. Diff Dx includes neuropsychiatric sequelae of COVID-19 / malignancy, or steroid / SSRI induced sarah. Will observe for now, obtain further information regarding psychiatric history, consider mood stabilizer.     Plan:  - Observation  - START VPA 1000 mg QHS for sarah  - IGM therapy as tolerated  - Expand database / obtain collateral  - Dispo planning (home)     I will continue to monitor blood levels (Depakote---a drug with a narrow therapeutic index= NTI) and associated labs for drug therapy implemented that require intense monitoring for toxicity as deemed appropriate based on current medication side effects and pharmacodynamically determined drug 1/2 lives. In summary, Parrish Kunz, is a 80 y.o.  male who presents with a severe exacerbation of the principal diagnosis of Sarah ([address]    Patient's condition is not improving. Patient requires continued inpatient hospitalization for further stabilization, safety monitoring and medication management. I will continue to coordinate the provision of individual, milieu, occupational, group, and substance abuse therapies to address target symptoms/diagnoses as deemed appropriate for the individual patient. A coordinated, multidisplinary treatment team round was conducted with the patient (this team consists of the nurse, psychiatric unit pharmacist,  and writer). Complete current electronic health record for patient has been reviewed today including consultant notes, ancillary staff notes, nurses and psychiatric tech notes. Suicide risk assessment completed and patient deemed to be of low risk for suicide at this time. The following regarding medications was addressed during rounds with patient:   the risks and benefits of the proposed medication. The patient was given the opportunity to ask questions. Informed consent given to the use of the above medications. Will continue to adjust psychiatric and non-psychiatric medications (see above \"medication\" section and orders section for details) as deemed appropriate & based upon diagnoses and response to treatment. I will continue to order blood tests/labs and diagnostic tests as deemed appropriate and review results as they become available (see orders for details and above listed lab/test results).     I will order psychiatric records from previous [facility] hospitals to further elucidate the nature of patient's psychopathology and review once available. I will gather additional collateral information from friends, family and o/p treatment team to further elucidate the nature of patient's psychopathology and baselline level of psychiatric functioning. I certify that this patient's inpatient psychiatric hospital services furnished since the previous certification were, and continue to be, required for treatment that could reasonably be expected to improve the patient's condition, or for diagnostic study, and that the patient continues to need, on a daily basis, active treatment furnished directly by or requiring the supervision of inpatient psychiatric facility personnel. In addition, the hospital records show that services furnished were intensive treatment services, admission or related services, or equivalent services.     EXPECTED DISCHARGE DATE/DAY: TBD     DISPOSITION: Home       Signed By:   Lubna Garces MD  5/15/2021

## 2021-05-15 NOTE — PROGRESS NOTES
Problem: Falls - Risk of  Goal: *Absence of Falls  Description: Document [place] Fall Risk and appropriate interventions in the flowsheet.   Outcome: Progressing Towards Goal  Note: Fall Risk Interventions:       Medication Interventions: Teach patient to arise slowly       Problem: Manic Behavior (Adult/Pediatric)  Goal: *STG: Participates in treatment plan  Outcome: Progressing Towards Goal

## 2021-05-15 NOTE — PROGRESS NOTES
0730 Pt received in hallway. Pt denies si/hi/ah/vh. Pt reports slightly diminished appetite due to his dentures. Pt requested Polyg[employer] to help his dentures. RN inquired MD on call for possible order. Anxiety=0/10 depression= 0/10. Pt is Aox4. Pt is calm and cooperative. Pt is compliant with his scheduled medications in the  morning. Pt continues to be jovial on the unit and be interactive with staff and other patients. Pt appears to continue to be manic/restless c as evidenced by pacing the hallway and inablity to to take a nap, despite pt's reported intention to do so. Pt appears to have improved over the past few days as evidnced by decreased agitation and decreased agitation. Will continue to monitor for safety. 1041 PRN Zyprexa PO given for agitation    1230 Pt compliant with scheduled PO medication. 1400 Pt reported that his mind has been slowed down now and that he feels better now. Medication appears to be effective.

## 2021-05-16 LAB
GLUCOSE BLD STRIP.AUTO-MCNC: 126 MG/DL (ref 65–117)
SERVICE CMNT-IMP: ABNORMAL

## 2021-05-16 PROCEDURE — 74011250637 HC RX REV CODE- 250/637: Performed by: STUDENT IN AN ORGANIZED HEALTH CARE EDUCATION/TRAINING PROGRAM

## 2021-05-16 PROCEDURE — 74011250636 HC RX REV CODE- 250/636: Performed by: PSYCHIATRY & NEUROLOGY

## 2021-05-16 PROCEDURE — 74011250637 HC RX REV CODE- 250/637: Performed by: NURSE PRACTITIONER

## 2021-05-16 PROCEDURE — 65220000003 HC RM SEMIPRIVATE PSYCH

## 2021-05-16 PROCEDURE — 82962 GLUCOSE BLOOD TEST: CPT

## 2021-05-16 PROCEDURE — 74011250637 HC RX REV CODE- 250/637: Performed by: PSYCHIATRY & NEUROLOGY

## 2021-05-16 RX ORDER — LOPERAMIDE HYDROCHLORIDE 2 MG/1
2 CAPSULE ORAL
Status: DISCONTINUED | OUTPATIENT
Start: 2021-05-16 | End: 2021-05-24 | Stop reason: HOSPADM

## 2021-05-16 RX ORDER — LOPERAMIDE HYDROCHLORIDE 2 MG/1
4 CAPSULE ORAL ONCE
Status: ACTIVE | OUTPATIENT
Start: 2021-05-16 | End: 2021-05-17

## 2021-05-16 RX ORDER — LANOLIN ALCOHOL/MO/W.PET/CERES
6 CREAM (GRAM) TOPICAL
Status: DISCONTINUED | OUTPATIENT
Start: 2021-05-16 | End: 2021-05-17

## 2021-05-16 RX ORDER — LOPERAMIDE HYDROCHLORIDE 2 MG/1
4 CAPSULE ORAL
Status: DISCONTINUED | OUTPATIENT
Start: 2021-05-16 | End: 2021-05-16 | Stop reason: DRUGHIGH

## 2021-05-16 RX ADMIN — GLIPIZIDE 5 MG: 5 TABLET ORAL at 09:14

## 2021-05-16 RX ADMIN — Medication 6 MG: at 21:08

## 2021-05-16 RX ADMIN — LISINOPRIL: 20 TABLET ORAL at 09:12

## 2021-05-16 RX ADMIN — PYRIDOSTIGMINE BROMIDE 30 MG: 60 TABLET ORAL at 09:21

## 2021-05-16 RX ADMIN — EZETIMIBE 10 MG: 10 TABLET ORAL at 09:14

## 2021-05-16 RX ADMIN — THERA TABS 1 TABLET: TAB at 09:14

## 2021-05-16 RX ADMIN — ASPIRIN 81 MG CHEWABLE TABLET 81 MG: 81 TABLET CHEWABLE at 09:13

## 2021-05-16 RX ADMIN — ROSUVASTATIN CALCIUM 20 MG: 10 TABLET, FILM COATED ORAL at 21:08

## 2021-05-16 RX ADMIN — MYCOPHENOLATE MOFETIL 500 MG: 250 CAPSULE ORAL at 17:28

## 2021-05-16 RX ADMIN — Medication 6 MG: at 01:05

## 2021-05-16 RX ADMIN — PYRIDOSTIGMINE BROMIDE 30 MG: 60 TABLET ORAL at 11:37

## 2021-05-16 RX ADMIN — MYCOPHENOLATE MOFETIL 500 MG: 250 CAPSULE ORAL at 09:18

## 2021-05-16 RX ADMIN — TRAZODONE HYDROCHLORIDE 50 MG: 50 TABLET ORAL at 21:08

## 2021-05-16 RX ADMIN — PYRIDOSTIGMINE BROMIDE 30 MG: 60 TABLET ORAL at 17:26

## 2021-05-16 NOTE — PROGRESS NOTES
Problem: Falls - Risk of  Goal: *Absence of Falls  Description: Document [place] Fall Risk and appropriate interventions in the flowsheet.   Outcome: Progressing Towards Goal  Note: Fall Risk Interventions:    Medication Interventions: Teach patient to arise slowly    Problem: Manic Behavior (Adult/Pediatric)  Goal: *STG: Demonstrates decrease in delusional thinking  Outcome: Progressing Towards Goal  Goal: *STG: Demonstrates improvement in sleep pattern  Outcome: Progressing Towards Goal  Goal: *STG: Remains safe in hospital  Outcome: Progressing Towards Goal  Goal: *STG/LTG: Complies with medication therapy  Outcome: Progressing Towards Goal

## 2021-05-16 NOTE — BH NOTES
PSYCHIATRIC PROGRESS NOTE         Patient Name  Jong Summers   Date of Birth 1950   [facility] [id number]   Medical Record Number  [id number]      Age  80 y.o. PCP Sharath No MD   Admit date:  5/12/2021    Room Number  317/01  @ [facility]   Date of Service  5/16/2021         E & M PROGRESS NOTE:         HISTORY       CC:  \"nick\"  HISTORY OF PRESENT ILLNESS/INTERVAL HISTORY:  (reviewed/updated 5/16/2021). per initial evaluation: The patient, Jong Summers, is a 80 y.o. WHITE male with a past psychiatric history significant for major depressive disorder, who presents at this time with complaints of (and/or evidence of) the following emotional symptoms: delusions and nick. Additional symptomatology include poor self care. The above symptoms have been present for 2+ weeks. These symptoms are of moderate to high severity. These symptoms are constant in nature. The patient's condition has been precipitated by psychosocial stressors. Patient's condition made worse by treatment noncompliance. UDS: negative; BAL=0.      The patient is a tangential historian. The patient corroborates the above narrative. The patient contracts for safety on the unit and gives consent for the team to contact collateral. The patient is amenable to initiating treatment while on the unit. On interview, the patient is grossly manic, preoccupied with his son potentially using marijuana and the legality of the substance in general. He speaks at length, and is focused on discharge. He has some insight into how he is presenting as he keeps staying 'this all seems crazy I know.' He at times accusing the staff of holding him illegally, threatens to zion MD for his prison and does not voice understanding of the TDO process. He is otherwise euphoric with ongoing difficulty to redirect.  After interview, patient returns to treatment team demanding someone call his local political official to help get him out of the hospital. Patient given access to the unit phone to speak with friends and family. 5/14 - patient remains labile but euphoric. He is grossly manic, and slept 5 hours overnight. Patient noted to have an episode of chest pain, but cardiac workup was negative. Patient continues to perseverate on the need to stop the proliferation of marijuana use amongst young people. Patient ambivalent about starting mood stabilizer, stating he will do so only on the advice of his PCP. MD reached out to patient's PCP Dr. Masha Rivera ([phone]) who was in agreement with the plan to start a mood stabilizer. PCP states that patient's wife called him out of concern, followed by the patient who advised the doctor to discount his wife's concern. 5/15 - \"You're as ugly as homemade sin. \" Joce Driscoll reports feeling fine today. He has been refusing medications and has a very limited insight. He slept 8 1/2 hours. He is disorganized and irritable on interview. 5/16 Rhys Marie reports feeling okay today. He is highly focused on discharge. \"I have less than 24 hours. \" We discussed that he may not in fact be discharged tomorrow but he seemed to dismiss this possibility. He reports improved sleep with melatonin. He denies suicidal thoughts today. No other concerns noted. SIDE EFFECTS: (reviewed/updated 5/16/2021)  None reported or admitted to. ALLERGIES:(reviewed/updated 5/16/2021)  Allergies   Allergen Reactions    Codeine Nausea Only      MEDICATIONS PRIOR TO ADMISSION:(reviewed/updated 5/16/2021)  Medications Prior to Admission   Medication Sig    glipiZIDE SR (GLUCOTROL XL) 5 mg CR tablet Take 5 mg by mouth daily. Indications: type 2 diabetes mellitus    therapeutic multivitamin (THERAGRAN) tablet Take 1 Tab by mouth daily.  predniSONE (DELTASONE) 20 mg tablet Take 20 mg by mouth daily.  escitalopram oxalate (LEXAPRO) 20 mg tablet Take 20 mg by mouth daily.  Indications: major depressive disorder    lisinopril-hydroCHLOROthiazide (PRINZIDE, ZESTORETIC) 20-25 mg per tablet Take 1 Tab by mouth daily. Indications: high blood pressure    traZODone (DESYREL) 50 mg tablet Take 50 mg by mouth nightly as needed for Sleep.  ezetimibe (ZETIA) 10 mg tablet Take 10 mg by mouth daily. Indications: excessive fat in the blood    rosuvastatin (CRESTOR) 20 mg tablet Take 20 mg by mouth nightly. Indications: excessive fat in the blood    mycophenolate (CELLCEPT) 500 mg tablet Take 1 Tab by mouth two (2) times a day. (Patient taking differently: Take 500 mg by mouth two (2) times a day. Indications: myasthenia gravis)    pyridostigmine (MESTINON) 60 mg tablet 1/2 tab three times daily (Patient taking differently: Take 30 mg by mouth three (3) times daily. 1/2 tab three times daily  Indications: myasthenia gravis, a skeletal muscle disorder)      PAST MEDICAL HISTORY: Past medical history from the initial psychiatric evaluation has been reviewed (reviewed/updated 5/16/2021) with no additional updates (I asked patient and no additional past medical history provided). Past Medical History:   Diagnosis Date    Anxiety disorder     Cancer ([facility] [address] [address]     PROSTATE; [facility]    COVID-19 01/01/2021    Diabetes ([facility] [address]     type II    GERD (gastroesophageal reflux disease)     Hypertension     Neurological disorder      Past Surgical History:   Procedure Laterality Date    HX OTHER SURGICAL      Horse Accident-surgery to face for fx    HX OTHER SURGICAL Right     Ear Surgery post facial surgery for scar tissue removal    HX UROLOGICAL        SOCIAL HISTORY: Social history from the initial psychiatric evaluation has been reviewed (reviewed/updated 5/16/2021) with no additional updates (I asked patient and no additional social history provided).    Social History     Socioeconomic History    Marital status:      Spouse name: Not on file    Number of children: Not on file    Years of education: Not on file    Highest education level: Not on file   Occupational History    Not on file   Social Needs    Financial resource strain: Not on file    Food insecurity     Worry: Not on file     Inability: Not on file    Transportation needs     Medical: Not on file     Non-medical: Not on file   Tobacco Use    Smoking status: Never Smoker    Smokeless tobacco: Never Used   Substance and Sexual Activity    Alcohol use: Yes     Comment: 1 drink 3x a week    Drug use: No    Sexual activity: Yes     Partners: Female   Lifestyle    Physical activity     Days per week: Not on file     Minutes per session: Not on file    Stress: Not on file   Relationships    Social connections     Talks on phone: Not on file     Gets together: Not on file     Attends Alevism service: Not on file     Active member of club or organization: Not on file     Attends meetings of clubs or organizations: Not on file     Relationship status: Not on file    Intimate partner violence     Fear of current or ex partner: Not on file     Emotionally abused: Not on file     Physically abused: Not on file     Forced sexual activity: Not on file   Other Topics Concern     Service Not Asked    Blood Transfusions Not Asked    Caffeine Concern Not Asked    Occupational Exposure Not Asked   [place] [employer] Not Asked    Sleep Concern Not Asked    Stress Concern Not Asked    Weight Concern Not Asked    Special Diet Not Asked    Back Care Not Asked    Exercise Not Asked    Bike Helmet Not Asked   [facility] Not Asked    Self-Exams Not Asked   Social History Narrative    Not on file      FAMILY HISTORY: Family history from the initial psychiatric evaluation has been reviewed (reviewed/updated 5/16/2021) with no additional updates (I asked patient and no additional family history provided).    Family History   Problem Relation Age of Onset    Cancer Brother         LUNG; THROAT    Anesth Problems Neg Hx        REVIEW OF SYSTEMS: (reviewed/updated 5/16/2021)  Appetite:no change from normal   Sleep: poor with DIMS (difficulty initiating & maintaining sleep)   All other Review of Systems: Negative except per HPI         [address] (MSE):    MSE FINDINGS ARE WITHIN NORMAL LIMITS (WNL) UNLESS OTHERWISE STATED BELOW. ( ALL OF THE BELOW CATEGORIES OF THE MSE HAVE BEEN REVIEWED (reviewed 5/16/2021) AND UPDATED AS DEEMED APPROPRIATE )  General Presentation age appropriate, cooperative   Orientation oriented to time, place and person   Vital Signs  See below (reviewed 5/16/2021); Vital Signs (BP, Pulse, & Temp) are within normal limits if not listed below.    Gait and Station Stable/steady, no ataxia   Musculoskeletal System No extrapyramidal symptoms (EPS); no abnormal muscular movements or Tardive Dyskinesia (TD); muscle strength and tone are within normal limits   Language No aphasia or dysarthria   Speech:  hyperverbal and pressured   Thought Processes illogical; normal rate of thoughts; fair abstract reasoning/computation   Thought Associations tangential   Thought Content paranoid delusions and preoccupations   Suicidal Ideations none   Homicidal Ideations none   Mood:  euphoric and labile    Affect:  full range and increased in intensity   Memory recent  intact   Memory remote:  intact   Concentration/Attention:  intact   Fund of Knowledge average   Insight:  poor   Reliability fair   Judgment:  poor          VITALS:     Patient Vitals for the past 24 hrs:   Temp Pulse Resp BP SpO2   05/16/21 1126  89  102/60    05/16/21 0912  99  99/61    05/16/21 0801 97.3 °F (36.3 °C) 92 16 99/69 96 %   05/15/21 2000 98 °F (36.7 °C) 88 18 (!) 123/95 100 %   05/15/21 1245 98 °F (36.7 °C) 100 18 (!) 138/104 100 %     Wt Readings from Last 3 Encounters:   05/12/21 71.4 kg (157 lb 4.8 oz)   05/12/21 72.6 kg (160 lb)   12/07/20 79.4 kg (175 lb)     Temp Readings from Last 3 Encounters:   05/16/21 97.3 °F (36.3 °C)   05/12/21 98.8 °F (37.1 °C)   07/20/20 98.3 °F (36.8 °C)     BP Readings from Last 3 Encounters:   05/16/21 102/60   05/12/21 (!) 142/100   04/21/21 (!) 154/84     Pulse Readings from Last 3 Encounters:   05/16/21 89   05/12/21 94   04/21/21 76            DATA     LABORATORY DATA:(reviewed/updated 5/16/2021)  Recent Results (from the past 24 hour(s))   GLUCOSE, POC    Collection Time: 05/15/21  5:02 PM   Result Value Ref Range    Glucose (POC) 154 (H) 65 - 117 mg/dL    Performed by Dion Hodgkins \"Kenneth\" RN    GLUCOSE, POC    Collection Time: 05/16/21  9:35 AM   Result Value Ref Range    Glucose (POC) 126 (H) 65 - 117 mg/dL    Performed by April Inman      No results found for: VALF2, VALAC, VALP, VALPR, DS6, CRBAM, CRBAMP, CARB2, XCRBAM  No results found for: LITHM   RADIOLOGY REPORTS:(reviewed/updated 5/16/2021)  Ct Head Wo Cont    Result Date: 5/12/2021  INDICATION: Altered mental status Exam: Noncontrast CT of the brain is performed with 5 mm collimation. CT dose reduction was achieved with the use of the standardized protocol tailored for this examination and automatic exposure control for dose modulation. Direct comparison is made to prior MR dated 10/2019. FINDINGS: There is no acute intracranial hemorrhage, mass, mass effect or herniation. Ventricular system is normal. The gray-white matter differentiation is well-preserved. The mastoid air cells are well pneumatized. No acute intracranial hemorrhage, mass or infarct. Xr Chest Port    Result Date: 5/13/2021  EXAM:  XR CHEST PORT INDICATION:  chest pain COMPARISON:  2019 FINDINGS: A portable AP radiograph of the chest was obtained at 1643 hours. . Lungs are clear of an acute process. There is question of a small nodule right midlung. .  The cardiac and mediastinal contours and pulmonary vascularity are normal.  There is degenerative spurring mid lower thoracic spine. 1. Lungs are clear of an acute process.  2. There is question of a nodule in the right midlung versus a nipple shadow. PA and lateral views with nipple markers is recommended for further assessment. Echo Adult Complete    Result Date: 5/14/2021  · LV: Estimated LVEF is 60 - 65%. Visually measured ejection fraction. Normal cavity size, wall thickness and systolic function (ejection fraction normal).  Wall motion: normal.           MEDICATIONS     ALL MEDICATIONS:   Current Facility-Administered Medications   Medication Dose Route Frequency    melatonin tablet 6 mg  6 mg Oral QHS PRN    divalproex ER (DEPAKOTE ER) 24 hour tablet 1,000 mg  1,000 mg Oral QHS    ezetimibe (ZETIA) tablet 10 mg  10 mg Oral DAILY    glipiZIDE (GLUCOTROL) tablet 5 mg  5 mg Oral DAILY    mycophenolate mofetil (CELLCEPT) capsule 500 mg  500 mg Oral BID    pyridostigmine (MESTINON) tablet 30 mg  30 mg Oral TID    rosuvastatin (CRESTOR) tablet 20 mg  20 mg Oral QHS    therapeutic multivitamin (THERAGRAN) tablet 1 Tab  1 Tab Oral DAILY    lisinopril/hydroCHLOROthiazide(PRINZIDE/ZESTORETIC) 20/25 mg   Oral DAILY    aspirin chewable tablet 81 mg  81 mg Oral DAILY    OLANZapine (ZyPREXA) tablet 2.5 mg  2.5 mg Oral Q6H PRN    haloperidol lactate (HALDOL) injection 2.5 mg  2.5 mg IntraMUSCular Q6H PRN    benztropine (COGENTIN) tablet 0.5 mg  0.5 mg Oral BID PRN    diphenhydrAMINE (BENADRYL) injection 25 mg  25 mg IntraMUSCular BID PRN    acetaminophen (TYLENOL) tablet 650 mg  650 mg Oral Q4H PRN    magnesium hydroxide (MILK OF MAGNESIA) 400 mg/5 mL oral suspension 30 mL  30 mL Oral DAILY PRN    cloNIDine HCL (CATAPRES) tablet 0.1 mg  0.1 mg Oral Q4H PRN    traZODone (DESYREL) tablet 50 mg  50 mg Oral QHS PRN      SCHEDULED MEDICATIONS:   Current Facility-Administered Medications   Medication Dose Route Frequency    divalproex ER (DEPAKOTE ER) 24 hour tablet 1,000 mg  1,000 mg Oral QHS    ezetimibe (ZETIA) tablet 10 mg  10 mg Oral DAILY    glipiZIDE (GLUCOTROL) tablet 5 mg  5 mg Oral DAILY    mycophenolate mofetil (CELLCEPT) capsule 500 mg  500 mg Oral BID    pyridostigmine (MESTINON) tablet 30 mg  30 mg Oral TID    rosuvastatin (CRESTOR) tablet 20 mg  20 mg Oral QHS    therapeutic multivitamin (THERAGRAN) tablet 1 Tab  1 Tab Oral DAILY    lisinopril/hydroCHLOROthiazide(PRINZIDE/ZESTORETIC) 20/25 mg   Oral DAILY    aspirin chewable tablet 81 mg  81 mg Oral DAILY          ASSESSMENT & PLAN     DIAGNOSES REQUIRING ACTIVE TREATMENT AND MONITORING: (reviewed/updated 5/16/2021)  Patient Active Hospital Problem List:   Sarah    Assessment: patient floridly manic, preoccupied with his children using drugs amongst other ongoing preoccupations. Per collateral, he has decompensated in the community, inappropriate behaviors have been noted at various public places. CT WNL, labwork unremarkable. Diff Dx includes neuropsychiatric sequelae of COVID-19 / malignancy, or steroid / SSRI induced sarah. Will observe for now, obtain further information regarding psychiatric history, consider mood stabilizer. Plan:  - Observation  - START VPA 1000 mg QHS for sarah  - IGM therapy as tolerated  - Expand database / obtain collateral  - Dispo planning (home)     I will continue to monitor blood levels (Depakote---a drug with a narrow therapeutic index= NTI) and associated labs for drug therapy implemented that require intense monitoring for toxicity as deemed appropriate based on current medication side effects and pharmacodynamically determined drug 1/2 lives. In summary, Lacy Wong, is a 80 y.o.  male who presents with a severe exacerbation of the principal diagnosis of Sarah ([address]    Patient's condition is not improving. Patient requires continued inpatient hospitalization for further stabilization, safety monitoring and medication management.   I will continue to coordinate the provision of individual, milieu, occupational, group, and substance abuse therapies to address target symptoms/diagnoses as deemed appropriate for the individual patient. A coordinated, multidisplinary treatment team round was conducted with the patient (this team consists of the nurse, psychiatric unit pharmacist,  and writer). Complete current electronic health record for patient has been reviewed today including consultant notes, ancillary staff notes, nurses and psychiatric tech notes. Suicide risk assessment completed and patient deemed to be of low risk for suicide at this time. The following regarding medications was addressed during rounds with patient:   the risks and benefits of the proposed medication. The patient was given the opportunity to ask questions. Informed consent given to the use of the above medications. Will continue to adjust psychiatric and non-psychiatric medications (see above \"medication\" section and orders section for details) as deemed appropriate & based upon diagnoses and response to treatment. I will continue to order blood tests/labs and diagnostic tests as deemed appropriate and review results as they become available (see orders for details and above listed lab/test results). I will order psychiatric records from previous [facility] hospitals to further elucidate the nature of patient's psychopathology and review once available. I will gather additional collateral information from friends, family and o/p treatment team to further elucidate the nature of patient's psychopathology and baselline level of psychiatric functioning. I certify that this patient's inpatient psychiatric hospital services furnished since the previous certification were, and continue to be, required for treatment that could reasonably be expected to improve the patient's condition, or for diagnostic study, and that the patient continues to need, on a daily basis, active treatment furnished directly by or requiring the supervision of inpatient psychiatric facility personnel.  In addition, the hospital records show that services furnished were intensive treatment services, admission or related services, or equivalent services.     EXPECTED DISCHARGE DATE/DAY: TBD     DISPOSITION: Home       Signed By:   Sarah Cade MD  5/16/2021

## 2021-05-16 NOTE — BH NOTES
GROUP THERAPY PROGRESS NOTE    Patient did not participate in Recreational Therapy/Coping Skills Group.     AJ Dawkins

## 2021-05-16 NOTE — BH NOTES
GROUP THERAPY PROGRESS NOTE     Patient is participating in Treatment Team Group. Group time: 60 minutes     Personal goal for participation: To participate in treatment plan and discharge plans     Goal orientation: Personal     Group therapy participation: active      Therapeutic interventions reviewed and discussed: Group members met with their treatment team individually and discussed their treatment plan, progress and concerns. Each patient was given the opportunity to ask questions related to their discharge and/or medications. Impression of participation: Patient participated in treatment team while resting in room. Patient engaged in conversation and discussion, presented as hyperverbal and tangential at times. Patient asked questions about treatment plan and discharge, patient is discharge focused.      AJ Hernandez

## 2021-05-16 NOTE — PROGRESS NOTES
2000 - assessment completed. A&O x4. Pt mood euthymic w/ bright affect. Talking a lot w/ peers and staff w/ somewhat rapid and pressured speech w/ euphoric mood at times. Pt focusing a lot on peer, stating he's a patriot and will support him running for [employer], etc. Speaks a lot about his 26 yo son and 20 yo daughter, acknowledging he can see how his kids and wife thought he was \"crazy\" but he insists he was just trying to help and protect his son who's been drinking a lot. Denies AH/VH and denies SI/HI. Pt still reports he's never really been depressed. 0105 - pt stated Trazadone given to him last night was not effective and he did not like the way it made him feel when he woke up. Stated he takes Melatonin at home which works good for him and asked if Melatonin can be ordered. Explained to pt Psych NP has to be paged and med has to be requested and pt stated understanding. Mylo Runner, NP did return page and ordered Melatonin 6mg PO QHS PRN insomnia which was given to pt.     0307 - sleeping soundly w/o distress  0501 - asleep - resp WDL    0700 - Slept 6.5 hrs. Verbal shift change report given to SANJU Boykin.

## 2021-05-16 NOTE — BH NOTES
GROUP THERAPY PROGRESS NOTE    Patient is participating in a Coping Skills Group. Group time: 45 minutes    Personal goal for participation: Share personal story and journey with mental health    Goal orientation: Personal    Group therapy participation: disruptive    Therapeutic interventions reviewed and discussed: Patients processed their current thoughts and feelings relating to treatment and their diagnoses while coloring and listening to music. Pts processed how to express their feelings in a healthy way. They identified ways to cope with stigma of mental health and how to engage in treatment despite barriers. Patients processed the importance of having a positive mindset and openness to treatment while admitted to hospital. Patients identified a goal for today. Impression of participation: Pt labile, hyperverbal, continuing to call his friend asking him to visit him during group, religiously preoccupied. Pt needed to be redirected numerous times, feels the staff have drugged him which are causing bowel issues. Pt amenable to idea that he is manic.      AJ Dawkins

## 2021-05-17 ENCOUNTER — APPOINTMENT (OUTPATIENT)
Dept: NON INVASIVE DIAGNOSTICS | Age: 71
DRG: 885 | End: 2021-05-17
Attending: PSYCHIATRY & NEUROLOGY
Payer: MEDICARE

## 2021-05-17 LAB
GLUCOSE BLD STRIP.AUTO-MCNC: 114 MG/DL (ref 65–117)
SERVICE CMNT-IMP: NORMAL
STRESS ANGINA INDEX: 0
STRESS BASELINE DIAS BP: 58 MMHG
STRESS BASELINE HR: 92 BPM
STRESS BASELINE SYS BP: 103 MMHG
STRESS ESTIMATED WORKLOAD: 7 METS
STRESS EXERCISE DUR MIN: NORMAL
STRESS O2 SAT PEAK: 96 %
STRESS O2 SAT REST: 98 %
STRESS PEAK DIAS BP: 52 MMHG
STRESS PEAK SYS BP: 169 MMHG
STRESS PERCENT HR ACHIEVED: 95 %
STRESS POST PEAK HR: 142 BPM
STRESS RATE PRESSURE PRODUCT: NORMAL BPM*MMHG
STRESS SR DUKE TREADMILL SCORE: 0
STRESS ST DEPRESSION: 0 MM
STRESS ST ELEVATION: 0 MM
STRESS STAGE 1 BP: NORMAL MMHG
STRESS STAGE 1 COMMENTS: NORMAL
STRESS STAGE 1 DURATION: NORMAL MIN:SEC
STRESS STAGE 1 HR: 101 BPM
STRESS STAGE 2 BP: NORMAL MMHG
STRESS STAGE 2 COMMENTS: NORMAL
STRESS STAGE 2 DURATION: NORMAL MIN:SEC
STRESS STAGE 2 HR: 111 BPM
STRESS STAGE 3 BP: NORMAL MMHG
STRESS STAGE 3 COMMENTS: NORMAL
STRESS STAGE 3 DURATION: NORMAL MIN:SEC
STRESS STAGE 3 HR: 134 BPM
STRESS STAGE 4 BP: NORMAL MMHG
STRESS STAGE 4 COMMENTS: NORMAL
STRESS STAGE 4 DURATION: NORMAL MIN:SEC
STRESS STAGE 4 HR: 104 BPM
STRESS TARGET HR: 150 BPM

## 2021-05-17 PROCEDURE — 99222 1ST HOSP IP/OBS MODERATE 55: CPT | Performed by: NURSE PRACTITIONER

## 2021-05-17 PROCEDURE — 82962 GLUCOSE BLOOD TEST: CPT

## 2021-05-17 PROCEDURE — 74011250637 HC RX REV CODE- 250/637: Performed by: NURSE PRACTITIONER

## 2021-05-17 PROCEDURE — 74011250637 HC RX REV CODE- 250/637: Performed by: PSYCHIATRY & NEUROLOGY

## 2021-05-17 PROCEDURE — 74011250636 HC RX REV CODE- 250/636: Performed by: PSYCHIATRY & NEUROLOGY

## 2021-05-17 PROCEDURE — 93351 STRESS TTE COMPLETE: CPT

## 2021-05-17 PROCEDURE — 65220000003 HC RM SEMIPRIVATE PSYCH

## 2021-05-17 PROCEDURE — 99233 SBSQ HOSP IP/OBS HIGH 50: CPT | Performed by: PSYCHIATRY & NEUROLOGY

## 2021-05-17 PROCEDURE — 74011250637 HC RX REV CODE- 250/637: Performed by: STUDENT IN AN ORGANIZED HEALTH CARE EDUCATION/TRAINING PROGRAM

## 2021-05-17 RX ORDER — NITROGLYCERIN 0.4 MG/1
TABLET SUBLINGUAL
Status: DISCONTINUED
Start: 2021-05-17 | End: 2021-05-17 | Stop reason: WASHOUT

## 2021-05-17 RX ORDER — LANOLIN ALCOHOL/MO/W.PET/CERES
6 CREAM (GRAM) TOPICAL
Status: DISCONTINUED | OUTPATIENT
Start: 2021-05-17 | End: 2021-05-24 | Stop reason: HOSPADM

## 2021-05-17 RX ORDER — AMINOPHYLLINE 25 MG/ML
INJECTION, SOLUTION INTRAVENOUS
Status: DISCONTINUED
Start: 2021-05-17 | End: 2021-05-17 | Stop reason: WASHOUT

## 2021-05-17 RX ORDER — AMLODIPINE BESYLATE 5 MG/1
5 TABLET ORAL DAILY
Status: DISCONTINUED | OUTPATIENT
Start: 2021-05-18 | End: 2021-05-24 | Stop reason: HOSPADM

## 2021-05-17 RX ORDER — ATROPINE SULFATE 0.1 MG/ML
INJECTION INTRAVENOUS
Status: DISCONTINUED
Start: 2021-05-17 | End: 2021-05-17 | Stop reason: WASHOUT

## 2021-05-17 RX ORDER — LITHIUM CARBONATE 450 MG/1
450 TABLET ORAL EVERY 12 HOURS
Status: DISCONTINUED | OUTPATIENT
Start: 2021-05-17 | End: 2021-05-24 | Stop reason: HOSPADM

## 2021-05-17 RX ADMIN — Medication 6 MG: at 21:34

## 2021-05-17 RX ADMIN — MYCOPHENOLATE MOFETIL 500 MG: 250 CAPSULE ORAL at 17:44

## 2021-05-17 RX ADMIN — PYRIDOSTIGMINE BROMIDE 30 MG: 60 TABLET ORAL at 07:57

## 2021-05-17 RX ADMIN — LITHIUM CARBONATE 450 MG: 450 TABLET, EXTENDED RELEASE ORAL at 21:34

## 2021-05-17 RX ADMIN — THERA TABS 1 TABLET: TAB at 07:55

## 2021-05-17 RX ADMIN — PYRIDOSTIGMINE BROMIDE 30 MG: 60 TABLET ORAL at 12:00

## 2021-05-17 RX ADMIN — ROSUVASTATIN CALCIUM 20 MG: 10 TABLET, FILM COATED ORAL at 21:34

## 2021-05-17 RX ADMIN — GLIPIZIDE 5 MG: 5 TABLET ORAL at 07:55

## 2021-05-17 RX ADMIN — MYCOPHENOLATE MOFETIL 500 MG: 250 CAPSULE ORAL at 12:07

## 2021-05-17 RX ADMIN — PYRIDOSTIGMINE BROMIDE 30 MG: 60 TABLET ORAL at 17:44

## 2021-05-17 RX ADMIN — ASPIRIN 81 MG CHEWABLE TABLET 81 MG: 81 TABLET CHEWABLE at 07:55

## 2021-05-17 RX ADMIN — DIVALPROEX SODIUM 1000 MG: 500 TABLET, EXTENDED RELEASE ORAL at 21:34

## 2021-05-17 RX ADMIN — LITHIUM CARBONATE 450 MG: 450 TABLET, EXTENDED RELEASE ORAL at 13:00

## 2021-05-17 RX ADMIN — EZETIMIBE 10 MG: 10 TABLET ORAL at 07:55

## 2021-05-17 RX ADMIN — TRAZODONE HYDROCHLORIDE 50 MG: 50 TABLET ORAL at 21:35

## 2021-05-17 NOTE — PROGRESS NOTES
Cardiology update 5/17/2021 1217 hrs. Stress echo negative for ischemia. No further recommendations from cardiology.     Thank you for this consultation  Joe Pulliam DNP, ANP-BC  [facility]

## 2021-05-17 NOTE — PROGRESS NOTES
Pt transported by  by this rn and third floor staff   Back to room 308, Sbar Report to Juan A Puckett on floor. Pt had 1/2 can of diet gingerale  3.5ounces. Pt ate two goldfish from snack pack,  Snack and soda given  To rn on floor, Pt cannot have can and could have drink placed in cup , but pt refused. Pt had no complaints at present.

## 2021-05-17 NOTE — BH NOTES
Behavioral Health Treatment Team Note         Patient goal(s) for today: TDO hearing and take medications as prescribed. Treatment team focus/goals: Monitoring and medication adjustments. Progress note:  Pt was seen in treatment team this morning. Pt is alert and oriented. Pt denies SI/HI. Pt's mood is labile, affect is WNL. Pt's thought process is illogical.  Pt's insight and judgment is poor, reliability is fair. SW updated pt's wife on medication changes. MD approved a visit from wife on Wednesday 5/19 at 6:00PM.  Social work department will continue to coordinate discharge plans.       LOS:  5                        Expected LOS: TBD     Financial concerns/prescription coverage: [employer] Medicare Part A&B  Date of last family contact: Jackalyn Angelucci - wife 5/17                           Family requesting physician contact today:  Yes - MD notified. Discharge plan: Home  Guns in the home: None involved. Outpatient provider(s):  To be linked.      Participating treatment team members: Bob Mehta, AJ Short and Dr. Sarah Blackwood MD.

## 2021-05-17 NOTE — BH NOTES
GROUP THERAPY PROGRESS NOTE    Patient did not participate in Discharge Planning Group.      Keith Rodriguez LPC LSATP [facility]

## 2021-05-17 NOTE — PROGRESS NOTES
Pt transported with This rn per wc with third floor student staff. Dr Mars Monday discussed test with t he patient , Pt had no further questions noted. Consent obtained. Pre stress echo picts obtained by echo tech BETTY Burt.

## 2021-05-17 NOTE — PROGRESS NOTES
Pt is A&OX4. Pt denies SI/HI, A/V H. Pt is very talkative, more calm/cooperative each day, still somewhat grandiose. Vitals WNL. Pt politely stated if the doctor doesn't discharge him Monday, that he knows who he is going to call to get him out of here. Pt is medication/diet compliant. Pt c/o diarrhea. Dr Liyah Garcia notified, Imodium/Loparimide ordered. Pt refused Imodium, stated his stomach was settled by drinking Sprite. Pt gets along well w/peers, participates in groups.

## 2021-05-17 NOTE — GROUP NOTE
BRADLY  GROUP DOCUMENTATION INDIVIDUAL Group Therapy Note Date: 5/17/2021 Group Start Time: 0900 Group End Time: 1000 Group Topic: Topic Group [facility] 3 ACUTE BEHAV [facility], [address] GROUP DOCUMENTATION GROUP Group Therapy Note Attendees: 5 Attendance: Did not attend Patient's Goal: Interventions/techniques Senia Corona

## 2021-05-17 NOTE — PROGRESS NOTES
Problem: Falls - Risk of  Goal: *Absence of Falls  Description: Document Carlos Perla Fall Risk and appropriate interventions in the flowsheet. Outcome: Progressing Towards Goal  Note: Fall Risk Interventions:     Medication Interventions: Teach patient to arise slowly     Problem: Manic Behavior (Adult/Pediatric)  Goal: *STG: Participates in treatment plan  Outcome: Progressing Towards Goal     0930 Pt received in room. Pt denies si/hi/ah/vh. Pt reports going to groups and eating outside in the dayroom. Anxiety=0/10 depression= 0/10. Pt is Aox4. Pt is calm and cooperative. Med and meal compliant. Pain level of 0/10. Pt stated goals are to be discharged back home. Pt continues to be manic on the unit and hyper-verbal. He has; however, improved over the past few days on the unit. 1030 Echo/Stress test performed in pt. Pt transported with [facility] transport staff    [phone] Pt returned to [facility] unit. Pt is safe,  calm and cooperative. 1400 Pt social and talkative on the floor with patients and staff. Will continue to monitor for any changes.

## 2021-05-17 NOTE — BH NOTES
PSYCHIATRIC PROGRESS NOTE         Patient Name  Jennifer Castanon   Date of Birth 1950   [facility] [id number]   Medical Record Number  [id number]      Age  80 y.o. PCP Dheeraj Ortega MD   Admit date:  5/12/2021    Room Number  308/01  @ [facility]   Date of Service  5/17/2021         E & M PROGRESS NOTE:         HISTORY       CC:  \"nick\"  HISTORY OF PRESENT ILLNESS/INTERVAL HISTORY:  (reviewed/updated 5/17/2021). per initial evaluation: The patient, Jennifer Castanon, is a 80 y.o. WHITE male with a past psychiatric history significant for major depressive disorder, who presents at this time with complaints of (and/or evidence of) the following emotional symptoms: delusions and nick. Additional symptomatology include poor self care. The above symptoms have been present for 2+ weeks. These symptoms are of moderate to high severity. These symptoms are constant in nature. The patient's condition has been precipitated by psychosocial stressors. Patient's condition made worse by treatment noncompliance. UDS: negative; BAL=0.      The patient is a tangential historian. The patient corroborates the above narrative. The patient contracts for safety on the unit and gives consent for the team to contact collateral. The patient is amenable to initiating treatment while on the unit. On interview, the patient is grossly manic, preoccupied with his son potentially using marijuana and the legality of the substance in general. He speaks at length, and is focused on discharge. He has some insight into how he is presenting as he keeps staying 'this all seems crazy I know.' He at times accusing the staff of holding him illegally, threatens to zion MD for his snf and does not voice understanding of the TDO process. He is otherwise euphoric with ongoing difficulty to redirect.  After interview, patient returns to treatment team demanding someone call his local political official to help get him out of the hospital. Patient given access to the unit phone to speak with friends and family. 5/14 - patient remains labile but euphoric. He is grossly manic, and slept 5 hours overnight. Patient noted to have an episode of chest pain, but cardiac workup was negative. Patient continues to perseverate on the need to stop the proliferation of marijuana use amongst young people. Patient ambivalent about starting mood stabilizer, stating he will do so only on the advice of his PCP. MD reached out to patient's PCP Dr. Lisbeth Allred ([phone]) who was in agreement with the plan to start a mood stabilizer. PCP states that patient's wife called him out of concern, followed by the patient who advised the doctor to discount his wife's concern. 5/15 - \"You're as ugly as homemade sin. \" Erlin Darling reports feeling fine today. He has been refusing medications and has a very limited insight. He slept 8 1/2 hours. He is disorganized and irritable on interview. 5/16 Sarath Robb reports feeling okay today. He is highly focused on discharge. \"I have less than 24 hours. \" We discussed that he may not in fact be discharged tomorrow but he seemed to dismiss this possibility. He reports improved sleep with melatonin. He denies suicidal thoughts today. No other concerns noted. 5/17 - patient has been grandiose, hyperverbal, still with poor insight into the reason for admission. Patient slept 9 hours with melatonin and trazodone, given Zyprexa several times over the weekend. Patient down for echocardiogram this morning. Patient improved per nursing, but grossly irritable and discharge focused. Patient remains preoccupied with drug use amongst young people. Medication compliance has been tenuous; he refused first dose of VPA, took second dose and refused third stating it was giving him diarrhea. After a discussion of risk and benefit, the patient agrees to a trial of lithium instead of VPA.  He states he needs a visit from his  prior to this and spontaneously declares he will be going on a hunger strike. SIDE EFFECTS: (reviewed/updated 5/17/2021)  None reported or admitted to. ALLERGIES:(reviewed/updated 5/17/2021)  Allergies   Allergen Reactions    Codeine Nausea Only      MEDICATIONS PRIOR TO ADMISSION:(reviewed/updated 5/17/2021)  Medications Prior to Admission   Medication Sig    glipiZIDE SR (GLUCOTROL XL) 5 mg CR tablet Take 5 mg by mouth daily. Indications: type 2 diabetes mellitus    therapeutic multivitamin (THERAGRAN) tablet Take 1 Tab by mouth daily.  predniSONE (DELTASONE) 20 mg tablet Take 20 mg by mouth daily.  escitalopram oxalate (LEXAPRO) 20 mg tablet Take 20 mg by mouth daily. Indications: major depressive disorder    lisinopril-hydroCHLOROthiazide (PRINZIDE, ZESTORETIC) 20-25 mg per tablet Take 1 Tab by mouth daily. Indications: high blood pressure    traZODone (DESYREL) 50 mg tablet Take 50 mg by mouth nightly as needed for Sleep.  ezetimibe (ZETIA) 10 mg tablet Take 10 mg by mouth daily. Indications: excessive fat in the blood    rosuvastatin (CRESTOR) 20 mg tablet Take 20 mg by mouth nightly. Indications: excessive fat in the blood    mycophenolate (CELLCEPT) 500 mg tablet Take 1 Tab by mouth two (2) times a day. (Patient taking differently: Take 500 mg by mouth two (2) times a day. Indications: myasthenia gravis)    pyridostigmine (MESTINON) 60 mg tablet 1/2 tab three times daily (Patient taking differently: Take 30 mg by mouth three (3) times daily. 1/2 tab three times daily  Indications: myasthenia gravis, a skeletal muscle disorder)      PAST MEDICAL HISTORY: Past medical history from the initial psychiatric evaluation has been reviewed (reviewed/updated 5/17/2021) with no additional updates (I asked patient and no additional past medical history provided).    Past Medical History:   Diagnosis Date    Anxiety disorder     Cancer ([facility] [address]     PROSTATE; BCCA    COVID-19 01/01/2021    Diabetes ([facility] [address]     type II    GERD (gastroesophageal reflux disease)     Hypertension     Neurological disorder      Past Surgical History:   Procedure Laterality Date    HX OTHER SURGICAL      Horse Accident-surgery to face for fx    HX OTHER SURGICAL Right     Ear Surgery post facial surgery for scar tissue removal    HX UROLOGICAL        SOCIAL HISTORY: Social history from the initial psychiatric evaluation has been reviewed (reviewed/updated 5/17/2021) with no additional updates (I asked patient and no additional social history provided).    Social History     Socioeconomic History    Marital status:      Spouse name: Not on file    Number of children: Not on file    Years of education: Not on file    Highest education level: Not on file   Occupational History    Not on file   Social Needs    Financial resource strain: Not on file    Food insecurity     Worry: Not on file     Inability: Not on file    Transportation needs     Medical: Not on file     Non-medical: Not on file   Tobacco Use    Smoking status: Never Smoker    Smokeless tobacco: Never Used   Substance and Sexual Activity    Alcohol use: Yes     Comment: 1 drink 3x a week    Drug use: No    Sexual activity: Yes     Partners: Female   Lifestyle    Physical activity     Days per week: Not on file     Minutes per session: Not on file    Stress: Not on file   Relationships    Social connections     Talks on phone: Not on file     Gets together: Not on file     Attends Jainism service: Not on file     Active member of club or organization: Not on file     Attends meetings of clubs or organizations: Not on file     Relationship status: Not on file    Intimate partner violence     Fear of current or ex partner: Not on file     Emotionally abused: Not on file     Physically abused: Not on file     Forced sexual activity: Not on file   Other Topics Concern   [address] Service Not Asked    Blood Transfusions Not Asked   Carmen Trevizo Caffeine Concern Not Asked    Occupational Exposure Not Asked    Hobby Hazards Not Asked    Sleep Concern Not Asked    Stress Concern Not Asked    Weight Concern Not Asked    Special Diet Not Asked    Back Care Not Asked    Exercise Not Asked    Bike Helmet Not Asked   [address] Not Asked    Self-Exams Not Asked   Social History Narrative    Not on file      FAMILY HISTORY: Family history from the initial psychiatric evaluation has been reviewed (reviewed/updated 5/17/2021) with no additional updates (I asked patient and no additional family history provided). Family History   Problem Relation Age of Onset    Cancer Brother         LUNG; THROAT    Anesth Problems Neg Hx        REVIEW OF SYSTEMS: (reviewed/updated 5/17/2021)  Appetite:no change from normal   Sleep: poor with DIMS (difficulty initiating & maintaining sleep)   All other Review of Systems: Negative except per HPI         [facility] (MSE):    MSE FINDINGS ARE WITHIN NORMAL LIMITS (WNL) UNLESS OTHERWISE STATED BELOW. ( ALL OF THE BELOW CATEGORIES OF THE MSE HAVE BEEN REVIEWED (reviewed 5/17/2021) AND UPDATED AS DEEMED APPROPRIATE )  General Presentation age appropriate, cooperative   Orientation oriented to time, place and person   Vital Signs  See below (reviewed 5/17/2021); Vital Signs (BP, Pulse, & Temp) are within normal limits if not listed below.    Gait and Station Stable/steady, no ataxia   Musculoskeletal System No extrapyramidal symptoms (EPS); no abnormal muscular movements or Tardive Dyskinesia (TD); muscle strength and tone are within normal limits   Language No aphasia or dysarthria   Speech:  hyperverbal and pressured   Thought Processes illogical; normal rate of thoughts; fair abstract reasoning/computation   Thought Associations tangential   Thought Content paranoid delusions and preoccupations   Suicidal Ideations none   Homicidal Ideations none   Mood:  euphoric and labile Affect:  full range and increased in intensity   Memory recent  intact   Memory remote:  intact   Concentration/Attention:  intact   Fund of Knowledge average   Insight:  poor   Reliability fair   Judgment:  poor          VITALS:     Patient Vitals for the past 24 hrs:   Temp Pulse Resp BP   05/17/21 0916    (!) 103/58   05/17/21 0754 97.6 °F (36.4 °C) 62 16 107/71   05/16/21 1948 97.5 °F (36.4 °C) 90 18 106/80   05/16/21 1126  89  102/60     Wt Readings from Last 3 Encounters:   05/17/21 71.7 kg (158 lb)   05/12/21 72.6 kg (160 lb)   12/07/20 79.4 kg (175 lb)     Temp Readings from Last 3 Encounters:   05/17/21 97.6 °F (36.4 °C)   05/12/21 98.8 °F (37.1 °C)   07/20/20 98.3 °F (36.8 °C)     BP Readings from Last 3 Encounters:   05/17/21 (!) 103/58   05/12/21 (!) 142/100   04/21/21 (!) 154/84     Pulse Readings from Last 3 Encounters:   05/17/21 62   05/12/21 94   04/21/21 76            DATA     LABORATORY DATA:(reviewed/updated 5/17/2021)  Recent Results (from the past 24 hour(s))   GLUCOSE, POC    Collection Time: 05/17/21  7:38 AM   Result Value Ref Range    Glucose (POC) 114 65 - 117 mg/dL    Performed by Mukul PIZARRO STRESS    Collection Time: 05/17/21  9:16 AM   Result Value Ref Range    Baseline HR 89 bpm    Baseline  mmHg    Stress Base Diastolic BP 58 mmHg    O2 sat rest 98 %    Target  bpm     No results found for: VALF2, VALAC, VALP, VALPR, DS6, CRBAM, CRBAMP, CARB2, XCRBAM  No results found for: LITHM   RADIOLOGY REPORTS:(reviewed/updated 5/17/2021)  Ct Head Wo Cont    Result Date: 5/12/2021  INDICATION: Altered mental status Exam: Noncontrast CT of the brain is performed with 5 mm collimation. CT dose reduction was achieved with the use of the standardized protocol tailored for this examination and automatic exposure control for dose modulation. Direct comparison is made to prior MR dated 10/2019.  FINDINGS: There is no acute intracranial hemorrhage, mass, mass effect or herniation. Ventricular system is normal. The gray-white matter differentiation is well-preserved. The mastoid air cells are well pneumatized. No acute intracranial hemorrhage, mass or infarct. Xr Chest Port    Result Date: 5/13/2021  EXAM:  XR CHEST PORT INDICATION:  chest pain COMPARISON:  2019 FINDINGS: A portable AP radiograph of the chest was obtained at 1643 hours. . Lungs are clear of an acute process. There is question of a small nodule right midlung. .  The cardiac and mediastinal contours and pulmonary vascularity are normal.  There is degenerative spurring mid lower thoracic spine. 1. Lungs are clear of an acute process. 2. There is question of a nodule in the right midlung versus a nipple shadow. PA and lateral views with nipple markers is recommended for further assessment. Echo Adult Complete    Result Date: 5/14/2021  · LV: Estimated LVEF is 60 - 65%. Visually measured ejection fraction. Normal cavity size, wall thickness and systolic function (ejection fraction normal).  Wall motion: normal.           MEDICATIONS     ALL MEDICATIONS:   Current Facility-Administered Medications   Medication Dose Route Frequency    aminophylline 500 mg/20 mL injection        nitroglycerin (NITROSTAT) 0.4 mg tablet        atropine 0.1 mg/mL injection        melatonin tablet 6 mg  6 mg Oral QHS PRN    loperamide (IMODIUM) capsule 2 mg  2 mg Oral Q4H PRN    divalproex ER (DEPAKOTE ER) 24 hour tablet 1,000 mg  1,000 mg Oral QHS    ezetimibe (ZETIA) tablet 10 mg  10 mg Oral DAILY    glipiZIDE (GLUCOTROL) tablet 5 mg  5 mg Oral DAILY    mycophenolate mofetil (CELLCEPT) capsule 500 mg  500 mg Oral BID    pyridostigmine (MESTINON) tablet 30 mg  30 mg Oral TID    rosuvastatin (CRESTOR) tablet 20 mg  20 mg Oral QHS    therapeutic multivitamin (THERAGRAN) tablet 1 Tab  1 Tab Oral DAILY    lisinopril/hydroCHLOROthiazide(PRINZIDE/ZESTORETIC) 20/25 mg   Oral DAILY    aspirin chewable tablet 81 mg  81 mg Oral DAILY    OLANZapine (ZyPREXA) tablet 2.5 mg  2.5 mg Oral Q6H PRN    haloperidol lactate (HALDOL) injection 2.5 mg  2.5 mg IntraMUSCular Q6H PRN    benztropine (COGENTIN) tablet 0.5 mg  0.5 mg Oral BID PRN    diphenhydrAMINE (BENADRYL) injection 25 mg  25 mg IntraMUSCular BID PRN    acetaminophen (TYLENOL) tablet 650 mg  650 mg Oral Q4H PRN    magnesium hydroxide (MILK OF MAGNESIA) 400 mg/5 mL oral suspension 30 mL  30 mL Oral DAILY PRN    cloNIDine HCL (CATAPRES) tablet 0.1 mg  0.1 mg Oral Q4H PRN    traZODone (DESYREL) tablet 50 mg  50 mg Oral QHS PRN      SCHEDULED MEDICATIONS:   Current Facility-Administered Medications   Medication Dose Route Frequency    aminophylline 500 mg/20 mL injection        nitroglycerin (NITROSTAT) 0.4 mg tablet        atropine 0.1 mg/mL injection        divalproex ER (DEPAKOTE ER) 24 hour tablet 1,000 mg  1,000 mg Oral QHS    ezetimibe (ZETIA) tablet 10 mg  10 mg Oral DAILY    glipiZIDE (GLUCOTROL) tablet 5 mg  5 mg Oral DAILY    mycophenolate mofetil (CELLCEPT) capsule 500 mg  500 mg Oral BID    pyridostigmine (MESTINON) tablet 30 mg  30 mg Oral TID    rosuvastatin (CRESTOR) tablet 20 mg  20 mg Oral QHS    therapeutic multivitamin (THERAGRAN) tablet 1 Tab  1 Tab Oral DAILY    lisinopril/hydroCHLOROthiazide(PRINZIDE/ZESTORETIC) 20/25 mg   Oral DAILY    aspirin chewable tablet 81 mg  81 mg Oral DAILY          ASSESSMENT & PLAN     DIAGNOSES REQUIRING ACTIVE TREATMENT AND MONITORING: (reviewed/updated 5/17/2021)  Patient Active Hospital Problem List:   Sarah    Assessment: patient floridly manic, preoccupied with his children using drugs amongst other ongoing preoccupations. Per collateral, he has decompensated in the community, inappropriate behaviors have been noted at various public places. CT WNL, labwork unremarkable. Diff Dx includes neuropsychiatric sequelae of COVID-19 / malignancy, or steroid / SSRI induced sarah.  Will observe for now, obtain further information regarding psychiatric history, consider mood stabilizer. Plan:  - Observation  - DISCONTINUE VPA due to patient preference  - START Lithium 450 mg BID for sarah  - Consider antipsychotic if delusions persist  - Consider court order  - IGM therapy as tolerated  - Expand database / obtain collateral  - Dispo planning (home)     I will continue to monitor blood levels (lithium---a drug with a narrow therapeutic index= NTI) and associated labs for drug therapy implemented that require intense monitoring for toxicity as deemed appropriate based on current medication side effects and pharmacodynamically determined drug 1/2 lives. In summary, Mary Puri, is a 80 y.o.  male who presents with a severe exacerbation of the principal diagnosis of Sarah ([address]    Patient's condition is not improving. Patient requires continued inpatient hospitalization for further stabilization, safety monitoring and medication management. I will continue to coordinate the provision of individual, milieu, occupational, group, and substance abuse therapies to address target symptoms/diagnoses as deemed appropriate for the individual patient. A coordinated, multidisplinary treatment team round was conducted with the patient (this team consists of the nurse, psychiatric unit pharmacist,  and writer). Complete current electronic health record for patient has been reviewed today including consultant notes, ancillary staff notes, nurses and psychiatric tech notes. Suicide risk assessment completed and patient deemed to be of low risk for suicide at this time. The following regarding medications was addressed during rounds with patient:   the risks and benefits of the proposed medication. The patient was given the opportunity to ask questions. Informed consent given to the use of the above medications.  Will continue to adjust psychiatric and non-psychiatric medications (see above \"medication\" section and orders section for details) as deemed appropriate & based upon diagnoses and response to treatment. I will continue to order blood tests/labs and diagnostic tests as deemed appropriate and review results as they become available (see orders for details and above listed lab/test results). I will order psychiatric records from previous [facility] hospitals to further elucidate the nature of patient's psychopathology and review once available. I will gather additional collateral information from friends, family and o/p treatment team to further elucidate the nature of patient's psychopathology and baselline level of psychiatric functioning. I certify that this patient's inpatient psychiatric hospital services furnished since the previous certification were, and continue to be, required for treatment that could reasonably be expected to improve the patient's condition, or for diagnostic study, and that the patient continues to need, on a daily basis, active treatment furnished directly by or requiring the supervision of inpatient psychiatric facility personnel. In addition, the hospital records show that services furnished were intensive treatment services, admission or related services, or equivalent services.     EXPECTED DISCHARGE DATE/DAY: TBD     DISPOSITION: Home       Signed By:   Yaa Mccauley MD  5/17/2021

## 2021-05-17 NOTE — PROGRESS NOTES
Problem: Manic Behavior (Adult/Pediatric)  Goal: *STG: Participates in treatment plan  Outcome: Progressing Towards Goal

## 2021-05-17 NOTE — GROUP NOTE
BRADLY  GROUP DOCUMENTATION INDIVIDUAL Group Therapy Note Date: 5/17/2021 Group Start Time: 1500 Group End Time: 8628 Group Topic: Recreational/Music Therapy [facility] [address] ACUTE BEHAV [facility], [address] GROUP DOCUMENTATION GROUP Group Therapy Note Attendees: 7 Attendance: Attended Patient's Goal:  To concentrate on selected task Interventions/techniques: Supported-crafts,games,music Interactions: Interacted appropriately Mental Status: Calm Behavior/appearance: Needed prompting Goals Achieved: Able to engage in interactions and Able to listen to others Additional Notes:  Pt left session early. Elin Santiago

## 2021-05-17 NOTE — PROGRESS NOTES
Laboratory monitoring for mood stabilizer and antipsychotics:    Recommended baseline monitoring has been completed based on this patient's current medication regimen. The patient is currently taking the following medication(s):   Current Facility-Administered Medications   Medication Dose Route Frequency    lithium carbonate CR (ESKALITH CR) tablet 450 mg  450 mg Oral Q12H    [START ON 5/18/2021] amLODIPine (NORVASC) tablet 5 mg  5 mg Oral DAILY    melatonin tablet 6 mg  6 mg Oral QHS    divalproex ER (DEPAKOTE ER) 24 hour tablet 1,000 mg  1,000 mg Oral QHS    ezetimibe (ZETIA) tablet 10 mg  10 mg Oral DAILY    glipiZIDE (GLUCOTROL) tablet 5 mg  5 mg Oral DAILY    mycophenolate mofetil (CELLCEPT) capsule 500 mg  500 mg Oral BID    pyridostigmine (MESTINON) tablet 30 mg  30 mg Oral TID    rosuvastatin (CRESTOR) tablet 20 mg  20 mg Oral QHS    therapeutic multivitamin (THERAGRAN) tablet 1 Tab  1 Tab Oral DAILY    aspirin chewable tablet 81 mg  81 mg Oral DAILY       Height, Weight, BMI Estimation  Estimated body mass index is 25.5 kg/m² as calculated from the following:    Height as of this encounter: 167.6 cm (66\"). Weight as of this encounter: 71.7 kg (158 lb). Renal Function, Hepatic Function and Chemistry  Estimated Creatinine Clearance: 88.6 mL/min (A) (by C-G formula based on SCr of 0.69 mg/dL (L)). Lab Results   Component Value Date/Time    Sodium 136 05/12/2021 02:44 PM    Potassium 3.9 05/12/2021 02:44 PM    Chloride 105 05/12/2021 02:44 PM    CO2 25 05/12/2021 02:44 PM    Anion gap 6 05/12/2021 02:44 PM    Glucose 105 (H) 05/12/2021 02:44 PM    BUN 13 05/12/2021 02:44 PM    Creatinine 0.69 (L) 05/12/2021 02:44 PM    BUN/Creatinine ratio 19 05/12/2021 02:44 PM    GFR est AA >60 05/12/2021 02:44 PM    GFR est non-AA >60 05/12/2021 02:44 PM    Calcium 9.1 05/12/2021 02:44 PM    ALT (SGPT) 20 05/12/2021 02:44 PM    Alk.  phosphatase 50 05/12/2021 02:44 PM    Protein, total 6.9 05/12/2021 02:44 PM Albumin 3.6 05/12/2021 02:44 PM    Globulin 3.3 05/12/2021 02:44 PM    A-G Ratio 1.1 05/12/2021 02:44 PM    Bilirubin, total 0.4 05/12/2021 02:44 PM       Lab Results   Component Value Date/Time    Glucose 105 (H) 05/12/2021 02:44 PM    Glucose (POC) 114 05/17/2021 07:38 AM       Lab Results   Component Value Date/Time    Hemoglobin A1c 5.8 (H) 05/14/2021 03:38 AM       Hematology  Lab Results   Component Value Date/Time    WBC 9.8 05/12/2021 02:44 PM    HGB 15.6 05/12/2021 02:44 PM    HCT 46.7 05/12/2021 02:44 PM    PLATELET 539 72/20/5111 02:44 PM    MCV 94.0 05/12/2021 02:44 PM       Lipids  Lab Results   Component Value Date/Time    Cholesterol, total 147 05/14/2021 03:38 AM    HDL Cholesterol 46 05/14/2021 03:38 AM    LDL, calculated 76.6 05/14/2021 03:38 AM    Triglyceride 122 05/14/2021 03:38 AM    CHOL/HDL Ratio 3.2 05/14/2021 03:38 AM       Thyroid Function    Lab Results   Component Value Date/Time    TSH 0.97 05/14/2021 03:38 AM     Vitals  Visit Vitals  BP (!) 103/58   Pulse 62   Temp 97.6 °F (36.4 °C)   Resp 16   Ht 167.6 cm (66\")   Wt 71.7 kg (158 lb)   SpO2 96%   BMI 25.50 kg/m²       ANTOINE Bass, [facility]  [phone] (pharmacy)

## 2021-05-17 NOTE — PROGRESS NOTES
Problem: Manic Behavior (Adult/Pediatric)  Goal: *STG: Remains safe in hospital  Outcome: Lio Car 27 Rec'd pt awake A&Ox4. Calm and cooperative. Visible on the unit. Social with peers. Spent time in the lounge playing card games with peers. Denies s/I, h/I and a/v hallucinations. Denies anxiety and depression. 2030 Pt remains visible. Hyperverbal at times. Maintains control. Will continue to monitor. 6803 Pt observed in bed appeared to be sleeping for 9 hrs. per rounding flow sheet. Maintained on q15 min safety checks.

## 2021-05-17 NOTE — CONSULTS
[facility]  [address], [address]  Office Phone [phone]      CARDIOLOGY CONSULTATION       Date of  Admission: 5/12/2021  8:36 PM     Admission type:Elective   Primary Care Physician:Atul Hernandez MD     Attending Provider: Grant Osman, *  Cardiology Provider: Maxwell Carrillo DNP, ANP-BC    CC/REASON FOR CONSULT: Chest pain:     Subjective:     Darlene Heck is a 80 y.o. male with a history of prostate cancer, GERD, diabetes, hypertension, hyperlipidemia, mental health disorder admitted for Psychosis [facility]) Evi Sears patient complained of chest pain on 5/13/2021. Cardiac enzymes negative x3 no acute EKG changes. Remote cardiology consult had been done by Dr. Brianna Oscar. Patient presents sitting in day room awake alert orientated x3. We went back into his room to discuss his symptoms. He had reported on 5/13/2021 onset of chest pain and collapsed to the floor. Rapid response was called patient states that he faked having a heart attack in order to obtain attention. He denies having chest pain or dyspnea on exertion. His cardiac risk factors include male, diabetes, hypertension, hyperlipidemia and family history of heart disease father passed away of an MI at the age of 76. Denies any difficulty with ambulation. Social use of alcohol, no tobacco or drug use.     Patient Active Problem List    Diagnosis Date Noted    Sarah [facility]) 05/13/2021    Malignant neoplasm of prostate ([facility] [address] 03/09/2015      Ryanne Godinez MD  Past Medical History:   Diagnosis Date    Anxiety disorder     Cancer ([address]     PROSTATE; BCCA    COVID-19 01/01/2021    Diabetes ([facility] [address]     type II    GERD (gastroesophageal reflux disease)     Hypertension     Neurological disorder       Social History     Socioeconomic History    Marital status:      Spouse name: Not on file    Number of children: Not on file    Years of education: Not on file    Highest education level: Not on file   Tobacco Use    Smoking status: Never Smoker    Smokeless tobacco: Never Used   Substance and Sexual Activity    Alcohol use: Yes     Comment: 1 drink 3x a week    Drug use: No    Sexual activity: Yes     Partners: Female   Other Topics Concern     Allergies   Allergen Reactions    Codeine Nausea Only      Family History   Problem Relation Age of Onset    Cancer Brother         LUNG; THROAT    Anesth Problems Neg Hx       Current Facility-Administered Medications   Medication Dose Route Frequency    melatonin tablet 6 mg  6 mg Oral QHS PRN    loperamide (IMODIUM) capsule 2 mg  2 mg Oral Q4H PRN    divalproex ER (DEPAKOTE ER) 24 hour tablet 1,000 mg  1,000 mg Oral QHS    ezetimibe (ZETIA) tablet 10 mg  10 mg Oral DAILY    glipiZIDE (GLUCOTROL) tablet 5 mg  5 mg Oral DAILY    mycophenolate mofetil (CELLCEPT) capsule 500 mg  500 mg Oral BID    pyridostigmine (MESTINON) tablet 30 mg  30 mg Oral TID    rosuvastatin (CRESTOR) tablet 20 mg  20 mg Oral QHS    therapeutic multivitamin (THERAGRAN) tablet 1 Tab  1 Tab Oral DAILY    lisinopril/hydroCHLOROthiazide(PRINZIDE/ZESTORETIC) 20/25 mg   Oral DAILY    aspirin chewable tablet 81 mg  81 mg Oral DAILY    OLANZapine (ZyPREXA) tablet 2.5 mg  2.5 mg Oral Q6H PRN    haloperidol lactate (HALDOL) injection 2.5 mg  2.5 mg IntraMUSCular Q6H PRN    benztropine (COGENTIN) tablet 0.5 mg  0.5 mg Oral BID PRN    diphenhydrAMINE (BENADRYL) injection 25 mg  25 mg IntraMUSCular BID PRN    acetaminophen (TYLENOL) tablet 650 mg  650 mg Oral Q4H PRN    magnesium hydroxide (MILK OF MAGNESIA) 400 mg/5 mL oral suspension 30 mL  30 mL Oral DAILY PRN    cloNIDine HCL (CATAPRES) tablet 0.1 mg  0.1 mg Oral Q4H PRN    traZODone (DESYREL) tablet 50 mg  50 mg Oral QHS PRN        Review of Symptoms:   11 systems reviewed, negative other than as stated in the HPI        Objective:      Visit Vitals  BP 107/71 (BP 1 Location: Left upper arm, BP Patient Position: Standing)   Pulse 62   Temp 97.6 °F (36.4 °C)   Resp 16   Ht 5' 5\" (1.651 m)   Wt 157 lb 4.8 oz (71.4 kg)   SpO2 96%   BMI 26.18 kg/m²       Physical Exam     General: Well developed, in no acute distress, cooperative and alert  HEENT: No carotid bruits, no JVD, trach is midline. Neck Supple, PERRL, EOM intact. Heart:  Normal S1/S2 negative S3 or S4. Regular, no murmur, gallop or rub. Respiratory: Clear bilaterally x 4, no wheezing or rales  Abdomen:   Soft, non-tender, no masses, bowel sounds are active. Extremities:  No edema, normal cap refill, no cyanosis, atraumatic. Neuro: A&Ox3, speech clear, gait stable. Skin: Skin color is normal. No rashes or lesions. Non diaphoretic  Vascular: 2+ pulses symmetric in all extremities    Data Review:   No results for input(s): WBC, HGB, HCT, PLT, HGBEXT, HCTEXT, PLTEXT in the last 72 hours. No results for input(s): NA, K, CL, CO2, GLU, BUN, CREA, CA, MG, PHOS, ALB, TBIL, TBILI, ALT, INR, INREXT in the last 72 hours. No lab exists for component: SGOT,  BNP    No results for input(s): TROIQ, CPK, CKMB in the last 72 hours. No intake or output data in the 24 hours ending 05/17/21 0810     Cardiographics    Telemetry:   ECG:     Echocardiogram:   Interpretation Summary    · LV: Estimated LVEF is 60 - 65%. Visually measured ejection fraction. Normal cavity size, wall thickness and systolic function (ejection fraction normal). Wall motion: normal.      Echo Findings    Left Ventricle Normal cavity size, wall thickness and systolic function (ejection fraction normal). Wall motion: normal. The estimated EF is 60 - 65%. Visually measured ejection fraction. Left Atrium Normal cavity size. Right Ventricle Normal cavity size and global systolic function. Right Atrium Normal cavity size. Aortic Valve Normal valve structure, no stenosis and no regurgitation.    Mitral Valve Normal valve structure, no stenosis and no regurgitation. Tricuspid Valve Normal valve structure, no stenosis and no regurgitation. Pulmonic Valve Pulmonic valve not well visualized. Trace regurgitation. Aorta Normal aortic root. Pericardium No evidence of pericardial effusion. CXRAY: Lungs clear        Assessment:       Principal Problem:    Sarah ([address] (5/13/2021)      Chest pain   Plan:     1. Chest pain: Cardiac enzymes negative x3, no acute findings on EKG. Multiple CAD risk factors will evaluate with stress ECHO   2. Hypertension: Controlled continue current antihypertensive therapy  3. Hyperlipidemia: LDL 76 continue statin therapy  4. Type 2 diabetes: A1c 5.8% continue current therapy    55-year-old male admitted for sarah had developed chest pain on 5/13/2021. On assessment today patient claims he was faking having a heart attack in order to gain attention on the unit. He has multiple CAD risk factors, due to his underlying mental illness it would be advisable to proceed with ischemic work-up stress echocardiogram.  Discussed with patient and he is agreeable to proceed. Joe Pulliam DNP, ANP-BC  Chart and note reviewed, discussed with advanced practioner and agree with recommendations and treatment plan as outlined.   Charmayne Field, MD Leia Pines, MD

## 2021-05-17 NOTE — BH NOTES
GROUP THERAPY PROGRESS NOTE    Patient is participating in a Coping Skills group. Group time:  45 minutes     Personal goal for participation: Discuss anxiety triggers and how to cope with anxiety     Goal orientation: Personal    Group therapy participation: minimal, disruptive     Therapeutic interventions reviewed and discussed: Patients completed worksheet and discussed triggers for anxiety, physical symptoms of anxiety, ruminating and racing thoughts experienced when anxious, and how to cope with anxiety. Patients processed the way anxiety has impacted their decision-making process and exasperated mental health needs. Impression of participation: Patient continued to walk in and out of the dayroom throughout group session. Pt pressured speech, hyperverbal, tangential. Pt kept stated he is being \"drugged\" because staff feels he is making other patients laugh too much, poor insight and judgement. Pt unable to identify triggers or coping skills for anxiety.     AJ Schuler

## 2021-05-17 NOTE — GROUP NOTE
BRADLY  GROUP DOCUMENTATION INDIVIDUAL Group Therapy Note Date: 5/17/2021 Group Start Time: 1100 Group End Time: 1200 Group Topic: [facility] Group [facility] ACUTE [facility] [place], [address] GROUP DOCUMENTATION GROUP Group Therapy Note Attendees: 7 Attendance: Attended Patient's Goal:  To participate in relaxation activity Interventions/techniques: Supported-game Follows Directions: Followed directions with prompts Interactions: Disorganized interaction Mental Status: Manic Behavior/appearance: Cooperative and Needed prompting Goals Achieved: Attended group Additional Notes:  Pleasant-hyper verbal,intrusive, disorganized thoughts 
, Hina Rossi

## 2021-05-18 ENCOUNTER — TELEPHONE (OUTPATIENT)
Dept: NEUROLOGY | Age: 71
End: 2021-05-18

## 2021-05-18 LAB
GLUCOSE BLD STRIP.AUTO-MCNC: 88 MG/DL (ref 65–117)
SERVICE CMNT-IMP: NORMAL
VALPROATE SERPL-MCNC: 44 UG/ML (ref 50–100)

## 2021-05-18 PROCEDURE — 74011250637 HC RX REV CODE- 250/637: Performed by: PSYCHIATRY & NEUROLOGY

## 2021-05-18 PROCEDURE — 99231 SBSQ HOSP IP/OBS SF/LOW 25: CPT | Performed by: PSYCHIATRY & NEUROLOGY

## 2021-05-18 PROCEDURE — 74011250636 HC RX REV CODE- 250/636: Performed by: PSYCHIATRY & NEUROLOGY

## 2021-05-18 PROCEDURE — 65220000003 HC RM SEMIPRIVATE PSYCH

## 2021-05-18 PROCEDURE — 80164 ASSAY DIPROPYLACETIC ACD TOT: CPT

## 2021-05-18 PROCEDURE — 74011250637 HC RX REV CODE- 250/637: Performed by: STUDENT IN AN ORGANIZED HEALTH CARE EDUCATION/TRAINING PROGRAM

## 2021-05-18 PROCEDURE — 90833 PSYTX W PT W E/M 30 MIN: CPT | Performed by: PSYCHIATRY & NEUROLOGY

## 2021-05-18 PROCEDURE — 36415 COLL VENOUS BLD VENIPUNCTURE: CPT

## 2021-05-18 PROCEDURE — 74011250637 HC RX REV CODE- 250/637: Performed by: NURSE PRACTITIONER

## 2021-05-18 PROCEDURE — 82962 GLUCOSE BLOOD TEST: CPT

## 2021-05-18 RX ADMIN — Medication 6 MG: at 21:17

## 2021-05-18 RX ADMIN — MYCOPHENOLATE MOFETIL 500 MG: 250 CAPSULE ORAL at 08:49

## 2021-05-18 RX ADMIN — ASPIRIN 81 MG CHEWABLE TABLET 81 MG: 81 TABLET CHEWABLE at 08:48

## 2021-05-18 RX ADMIN — LITHIUM CARBONATE 450 MG: 450 TABLET, EXTENDED RELEASE ORAL at 08:51

## 2021-05-18 RX ADMIN — EZETIMIBE 10 MG: 10 TABLET ORAL at 08:48

## 2021-05-18 RX ADMIN — MYCOPHENOLATE MOFETIL 500 MG: 250 CAPSULE ORAL at 17:25

## 2021-05-18 RX ADMIN — TRAZODONE HYDROCHLORIDE 50 MG: 50 TABLET ORAL at 21:16

## 2021-05-18 RX ADMIN — ROSUVASTATIN CALCIUM 20 MG: 10 TABLET, FILM COATED ORAL at 21:18

## 2021-05-18 RX ADMIN — GLIPIZIDE 5 MG: 5 TABLET ORAL at 08:48

## 2021-05-18 RX ADMIN — PYRIDOSTIGMINE BROMIDE 30 MG: 60 TABLET ORAL at 11:32

## 2021-05-18 RX ADMIN — LOPERAMIDE HYDROCHLORIDE 2 MG: 2 CAPSULE ORAL at 08:47

## 2021-05-18 RX ADMIN — THERA TABS 1 TABLET: TAB at 08:48

## 2021-05-18 RX ADMIN — PYRIDOSTIGMINE BROMIDE 30 MG: 60 TABLET ORAL at 18:05

## 2021-05-18 RX ADMIN — AMLODIPINE BESYLATE 5 MG: 5 TABLET ORAL at 08:48

## 2021-05-18 RX ADMIN — PYRIDOSTIGMINE BROMIDE 30 MG: 60 TABLET ORAL at 08:49

## 2021-05-18 RX ADMIN — LITHIUM CARBONATE 450 MG: 450 TABLET, EXTENDED RELEASE ORAL at 21:18

## 2021-05-18 NOTE — GROUP NOTE
BRADLY  GROUP DOCUMENTATION INDIVIDUAL Group Therapy Note Date: 5/18/2021 Group Start Time: 0900 Group End Time: 1000 Group Topic: Topic Group [facility] 3 ACUTE BEHAV [facility] [place], [address] GROUP DOCUMENTATION GROUP Group Therapy Note Attendees: 6 Attendance: Attended Patient's Goal:  To participate in relaxation activity Interventions/techniques: Supported-game Interactions: Interacted appropriately Mental Status: Calm Behavior/appearance: Needed prompting Goals Achieved: Able to engage in interactions and Able to listen to others Additional Notes:  Pleasant,declined active participation-reported not feeling well,left early Geovani Ashley

## 2021-05-18 NOTE — BH NOTES
GROUP THERAPY PROGRESS NOTE    Patient is participating in coping skills group. Group time: 45 minutes    Personal goal for participation: Learn coping skills to reduce negative emotions    Goal orientation: Personal    Group therapy participation: passive    Therapeutic interventions reviewed and discussed: Group discussion on why being bored can be a problem and the consequences of boredom that patient have experienced. Patient discussed issues they have had with being bored and ways they have tried to combat boredom. This lead to discussion of coping skills for negative emotions and ways to feel better that each patient has tried or that they have heard of. Discussion of activities that help with boredom, challenges, potential solutions and plans. Impression of participation: Pete Lim was present at times in group but did not stay and sit down. He would come in and listen and then walk out or come and get a snack and then leave. He did not participate in group but was observed listening at times when he was present.     Yesi Paz [facility]

## 2021-05-18 NOTE — BH NOTES
Behavioral Health Treatment Team Note         Patient goal(s) for today: TDO hearing and take medications as prescribed.   Treatment team focus/goals: Monitoring and medication adjustments.   Progress note:  Pt was seen in treatment team this morning. Pt is alert and oriented. Pt denies SI/HI. Pt's mood is labile, affect is WNL.  Pt's thought process is illogical.  Pt's insight and judgment is poor, reliability is fair.   MD approved a visit from wife on Wednesday 5/19 at 6:00PM.  Social work department will continue to coordinate discharge plans.       LOS:  6                        Expected LOS: TBD     Financial concerns/prescription coverage: [employer] Medicare Part A&B  Date of last family contact: Mis - wife 5/17                           Family requesting physician contact today:  Yes - MD notified 5/18 that wife has specific questions she wants to speak directly to him about.   Discharge plan: Home  Guns in the home: None involved.                                                       Outpatient provider(s): To be linked.      Participating treatment team AJ Alarcon and Dr. Isaiah Jarquin MD.

## 2021-05-18 NOTE — BH NOTES
PSYCHOTHERAPY SESSION NOTE     Patient Name  Sarah Meier   Date of Birth 1950   [facility] [id number]   Medical Record Number  [id number]      Age  80 y.o. PCP Charbel Menendez MD   Admit date:  5/12/2021    Room Number  324/01  @ [facility]   Date of Service  5/18/2021            Length of psychotherapy session: 30 minutes    Main condition/diagnosis/issues treated during session today, 5/18/2021 : nick    I employed Cognitive Behavioral therapy techniques, Reality-Oriented psychotherapy, as well as supportive psychotherapy in regards to various ongoing psychosocial stressors, including the following: pre-admission and current problems; occupational issues; familial ssues; and stress of hospitalization. Interpersonal relationship issues and psychodynamic conflicts explored. Attempts made to alleviate maladaptive patterns. Session focused on the patient's concerns about his children, notably his son using marijuana. Patient acknowledges that other family members do not share his concern but repeatedly states that it is important for him to carry on this work. Overall, patient is not progressing. Treatment Plan Update (reviewed and updated 5/18/2021) : I will modify psychotherapy tx plan by implementing more stress management strategies, building upon cognitive behavioral techniques, increasing coping skills, as well as shoring up psychological defenses). An extended energy and skill set was needed to engage pt in psychotherapy due to some of the following: resistiveness, complexity, negativity, confrontational nature, hostile behaviors, and/or severe abnormalities in thought processes/psychosis resulting in the loss of expressive/receptive language communication skills.

## 2021-05-18 NOTE — TELEPHONE ENCOUNTER
Patient's wife called and notified [employer] is in the [facility] system and they should be able to access the records in the system.

## 2021-05-18 NOTE — GROUP NOTE
BRADLY  GROUP DOCUMENTATION INDIVIDUAL Group Therapy Note Date: 5/18/2021 Group Start Time: 1100 Group End Time: 1200 Group Topic: Topic Group [address] 3 ACUTE BEHAV [facility], [address] [facility] GROUP DOCUMENTATION GROUP Group Therapy Note Attendees: 4 Attendance: Did not attend Patient's Goal: Interventions/techniques: 
Terri Schmidt

## 2021-05-18 NOTE — PROGRESS NOTES
Problem: Falls - Risk of  Goal: *Absence of Falls  Description: Document Mayte Ray Fall Risk and appropriate interventions in the flowsheet. Outcome: Progressing Towards Goal  Note: Fall Risk Interventions:    Medication Interventions: Teach patient to arise slowly     Problem: Manic Behavior (Adult/Pediatric)  Goal: *STG: Participates in treatment plan  5/18/2021 1224 by Idalia Sanchez RN  Outcome: Progressing Towards Goal  5/18/2021 1221 by Idalia Sanchez RN  Outcome: Progressing Towards Goal  Goal: *LTG: Verbalizes insights regarding recovery  Outcome: Progressing Towards Goal    0930 Pt received in [facility]. Pt denies si/hi/ah/vh. It appears pt's manic behavior has significantly improved over the past few days as evidenced by decreased rapid speech and decreased delusional/paranoid thoughts regarding his his family and the youth's issues with alcohol/drug addictions. Pt also reports diarrhea in the morning and throughout the night. Pt continues to be discharge focused. Pt has become more accepting of his current need for medication and treatment in the hospital.     PRN Imodium administered and ginger ale provided, per pt equest.     1200 RN spoke to pt's wife per request of pt. Pt acknowledged to RN consent to disclose medical information. Consent form given to pt to fill out. 1500 Consent to release information signed and placed in chart for pt's wife. 36 Pt presented to RN with flushed face and shivering. Vital signs taken and glucose checked 88.

## 2021-05-18 NOTE — BH NOTES
PSYCHIATRIC PROGRESS NOTE         Patient Name  Parrish Kunz   Date of Birth 1950   [facility] [id number]   Medical Record Number  [id number]      Age  80 y.o. PCP Donald White MD   Admit date:  5/12/2021    Room Number  324/01  @ [facility]   Date of Service  5/18/2021         E & M PROGRESS NOTE:         HISTORY       CC:  \"nick\"  HISTORY OF PRESENT ILLNESS/INTERVAL HISTORY:  (reviewed/updated 5/18/2021). per initial evaluation: The patient, Parrish Kunz, is a 80 y.o. WHITE male with a past psychiatric history significant for major depressive disorder, who presents at this time with complaints of (and/or evidence of) the following emotional symptoms: delusions and nick. Additional symptomatology include poor self care. The above symptoms have been present for 2+ weeks. These symptoms are of moderate to high severity. These symptoms are constant in nature. The patient's condition has been precipitated by psychosocial stressors. Patient's condition made worse by treatment noncompliance. UDS: negative; BAL=0.      The patient is a tangential historian. The patient corroborates the above narrative. The patient contracts for safety on the unit and gives consent for the team to contact collateral. The patient is amenable to initiating treatment while on the unit. On interview, the patient is grossly manic, preoccupied with his son potentially using marijuana and the legality of the substance in general. He speaks at length, and is focused on discharge. He has some insight into how he is presenting as he keeps staying 'this all seems crazy I know.' He at times accusing the staff of holding him illegally, threatens to zion MD for his halfway and does not voice understanding of the TDO process. He is otherwise euphoric with ongoing difficulty to redirect.  After interview, patient returns to treatment team demanding someone call his local political official to help get him out of the hospital. Patient given access to the unit phone to speak with friends and family. 5/14 - patient remains labile but euphoric. He is grossly manic, and slept 5 hours overnight. Patient noted to have an episode of chest pain, but cardiac workup was negative. Patient continues to perseverate on the need to stop the proliferation of marijuana use amongst young people. Patient ambivalent about starting mood stabilizer, stating he will do so only on the advice of his PCP. MD reached out to patient's PCP Dr. Nayla Mooney ([phone]) who was in agreement with the plan to start a mood stabilizer. PCP states that patient's wife called him out of concern, followed by the patient who advised the doctor to discount his wife's concern. 5/15 - \"You're as ugly as homemade sin. \" Roe Martinez reports feeling fine today. He has been refusing medications and has a very limited insight. He slept 8 1/2 hours. He is disorganized and irritable on interview. 5/16 Lucrecia Ohara reports feeling okay today. He is highly focused on discharge. \"I have less than 24 hours. \" We discussed that he may not in fact be discharged tomorrow but he seemed to dismiss this possibility. He reports improved sleep with melatonin. He denies suicidal thoughts today. No other concerns noted. 5/17 - patient has been grandiose, hyperverbal, still with poor insight into the reason for admission. Patient slept 9 hours with melatonin and trazodone, given Zyprexa several times over the weekend. Patient down for echocardiogram this morning. Patient improved per nursing, but grossly irritable and discharge focused. Patient remains preoccupied with drug use amongst young people. Medication compliance has been tenuous; he refused first dose of VPA, took second dose and refused third stating it was giving him diarrhea. After a discussion of risk and benefit, the patient agrees to a trial of lithium instead of VPA.  He states he needs a visit from his  prior to this and spontaneously declares he will be going on a hunger strike. 5/18 - patient slept 9 hours overnight. He is notably more calm this morning. Patient got VPA dose last night, but is on lithium only as of this morning. He c/o diarrhea and stomach upset, due to the evening dose of VPA being given. Patient medication compliant, he speaks at length about his ongoing suspicions toward his family, notably his son and his son's friends, regarding substance use; these statements are illogical but coherent. Patient denies SI/HI/AVH; he is discharge focused and planning on appealing his commitment tomorrow. SIDE EFFECTS: (reviewed/updated 5/18/2021)  None reported or admitted to. ALLERGIES:(reviewed/updated 5/18/2021)  Allergies   Allergen Reactions    Codeine Nausea Only      MEDICATIONS PRIOR TO ADMISSION:(reviewed/updated 5/18/2021)  Medications Prior to Admission   Medication Sig    glipiZIDE SR (GLUCOTROL XL) 5 mg CR tablet Take 5 mg by mouth daily. Indications: type 2 diabetes mellitus    therapeutic multivitamin (THERAGRAN) tablet Take 1 Tab by mouth daily.  predniSONE (DELTASONE) 20 mg tablet Take 20 mg by mouth daily.  escitalopram oxalate (LEXAPRO) 20 mg tablet Take 20 mg by mouth daily. Indications: major depressive disorder    lisinopril-hydroCHLOROthiazide (PRINZIDE, ZESTORETIC) 20-25 mg per tablet Take 1 Tab by mouth daily. Indications: high blood pressure    traZODone (DESYREL) 50 mg tablet Take 50 mg by mouth nightly as needed for Sleep.  ezetimibe (ZETIA) 10 mg tablet Take 10 mg by mouth daily. Indications: excessive fat in the blood    rosuvastatin (CRESTOR) 20 mg tablet Take 20 mg by mouth nightly. Indications: excessive fat in the blood    mycophenolate (CELLCEPT) 500 mg tablet Take 1 Tab by mouth two (2) times a day. (Patient taking differently: Take 500 mg by mouth two (2) times a day.  Indications: myasthenia gravis)    pyridostigmine (MESTINON) 60 mg tablet 1/2 tab three times daily (Patient taking differently: Take 30 mg by mouth three (3) times daily. 1/2 tab three times daily  Indications: myasthenia gravis, a skeletal muscle disorder)      PAST MEDICAL HISTORY: Past medical history from the initial psychiatric evaluation has been reviewed (reviewed/updated 5/18/2021) with no additional updates (I asked patient and no additional past medical history provided). Past Medical History:   Diagnosis Date    Anxiety disorder     Cancer ([facility] [address]     PROSTATE; BCCA    COVID-19 01/01/2021    Diabetes ([facility]     type II    GERD (gastroesophageal reflux disease)     Hypertension     Neurological disorder      Past Surgical History:   Procedure Laterality Date    HX OTHER SURGICAL      Horse Accident-surgery to face for fx    HX OTHER SURGICAL Right     Ear Surgery post facial surgery for scar tissue removal    HX UROLOGICAL        SOCIAL HISTORY: Social history from the initial psychiatric evaluation has been reviewed (reviewed/updated 5/18/2021) with no additional updates (I asked patient and no additional social history provided).    Social History     Socioeconomic History    Marital status:      Spouse name: Not on file    Number of children: Not on file    Years of education: Not on file    Highest education level: Not on file   Occupational History    Not on file   Social Needs    Financial resource strain: Not on file    Food insecurity     Worry: Not on file     Inability: Not on file    Transportation needs     Medical: Not on file     Non-medical: Not on file   Tobacco Use    Smoking status: Never Smoker    Smokeless tobacco: Never Used   Substance and Sexual Activity    Alcohol use: Yes     Comment: 1 drink 3x a week    Drug use: No    Sexual activity: Yes     Partners: Female   Lifestyle    Physical activity     Days per week: Not on file     Minutes per session: Not on file    Stress: Not on file   Relationships    Social connections Talks on phone: Not on file     Gets together: Not on file     Attends Gnosticist service: Not on file     Active member of club or organization: Not on file     Attends meetings of clubs or organizations: Not on file     Relationship status: Not on file    Intimate partner violence     Fear of current or ex partner: Not on file     Emotionally abused: Not on file     Physically abused: Not on file     Forced sexual activity: Not on file   Other Topics Concern     Service Not Asked    Blood Transfusions Not Asked    Caffeine Concern Not Asked    Occupational Exposure Not Asked   Dory Lang Hazards Not Asked    Sleep Concern Not Asked    Stress Concern Not Asked    Weight Concern Not Asked    Special Diet Not Asked    Back Care Not Asked    Exercise Not Asked    Bike Helmet Not Asked   [address] Not Asked    Self-Exams Not Asked   Social History Narrative    Not on file      FAMILY HISTORY: Family history from the initial psychiatric evaluation has been reviewed (reviewed/updated 5/18/2021) with no additional updates (I asked patient and no additional family history provided). Family History   Problem Relation Age of Onset    Cancer Brother         LUNG; THROAT    Anesth Problems Neg Hx        REVIEW OF SYSTEMS: (reviewed/updated 5/18/2021)  Appetite:no change from normal   Sleep: poor with DIMS (difficulty initiating & maintaining sleep)   All other Review of Systems: Negative except per HPI         [address] [facility] (MSE):    MSE FINDINGS ARE WITHIN NORMAL LIMITS (WNL) UNLESS OTHERWISE STATED BELOW. ( ALL OF THE BELOW CATEGORIES OF THE MSE HAVE BEEN REVIEWED (reviewed 5/18/2021) AND UPDATED AS DEEMED APPROPRIATE )  General Presentation age appropriate, cooperative   Orientation oriented to time, place and person   Vital Signs  See below (reviewed 5/18/2021); Vital Signs (BP, Pulse, & Temp) are within normal limits if not listed below.    Gait and Station Stable/steady, no ataxia   Musculoskeletal System No extrapyramidal symptoms (EPS); no abnormal muscular movements or Tardive Dyskinesia (TD); muscle strength and tone are within normal limits   Language No aphasia or dysarthria   Speech:  hyperverbal and pressured   Thought Processes illogical; normal rate of thoughts; fair abstract reasoning/computation   Thought Associations tangential   Thought Content paranoid delusions and preoccupations   Suicidal Ideations none   Homicidal Ideations none   Mood:  euphoric and labile    Affect:  full range and increased in intensity   Memory recent  intact   Memory remote:  intact   Concentration/Attention:  intact   Fund of Knowledge average   Insight:  poor   Reliability fair   Judgment:  poor          VITALS:     Patient Vitals for the past 24 hrs:   Temp Pulse Resp BP SpO2   05/18/21 1623 97.3 °F (36.3 °C) (!) 105 16 (!) 128/97 100 %   05/18/21 0839 97.7 °F (36.5 °C) 93 16 100/60 99 %   05/17/21 1935 97.4 °F (36.3 °C) 82 18 (!) 96/52 99 %     Wt Readings from Last 3 Encounters:   05/17/21 71.7 kg (158 lb)   05/12/21 72.6 kg (160 lb)   12/07/20 79.4 kg (175 lb)     Temp Readings from Last 3 Encounters:   05/18/21 97.3 °F (36.3 °C)   05/12/21 98.8 °F (37.1 °C)   07/20/20 98.3 °F (36.8 °C)     BP Readings from Last 3 Encounters:   05/18/21 (!) 128/97   05/12/21 (!) 142/100   04/21/21 (!) 154/84     Pulse Readings from Last 3 Encounters:   05/18/21 (!) 105   05/12/21 94   04/21/21 76            DATA     LABORATORY DATA:(reviewed/updated 5/18/2021)  Recent Results (from the past 24 hour(s))   VALPROIC ACID    Collection Time: 05/18/21  5:57 AM   Result Value Ref Range    Valproic acid 44 (L) 50 - 100 ug/ml   GLUCOSE, POC    Collection Time: 05/18/21  4:12 PM   Result Value Ref Range    Glucose (POC) 88 65 - 117 mg/dL    Performed by [address]  PCT      Lab Results   Component Value Date/Time    Valproic acid 44 (L) 05/18/2021 05:57 AM     No results found for: [facility] RADIOLOGY REPORTS:(reviewed/updated 5/18/2021)  Ct Head Wo Cont    Result Date: 5/12/2021  INDICATION: Altered mental status Exam: Noncontrast CT of the brain is performed with 5 mm collimation. CT dose reduction was achieved with the use of the standardized protocol tailored for this examination and automatic exposure control for dose modulation. Direct comparison is made to prior MR dated 10/2019. FINDINGS: There is no acute intracranial hemorrhage, mass, mass effect or herniation. Ventricular system is normal. The gray-white matter differentiation is well-preserved. The mastoid air cells are well pneumatized. No acute intracranial hemorrhage, mass or infarct. Xr Chest Port    Result Date: 5/13/2021  EXAM:  XR CHEST PORT INDICATION:  chest pain COMPARISON:  2019 FINDINGS: A portable AP radiograph of the chest was obtained at 1643 hours. . Lungs are clear of an acute process. There is question of a small nodule right midlung. .  The cardiac and mediastinal contours and pulmonary vascularity are normal.  There is degenerative spurring mid lower thoracic spine. 1. Lungs are clear of an acute process. 2. There is question of a nodule in the right midlung versus a nipple shadow. PA and lateral views with nipple markers is recommended for further assessment. Echo Adult Complete    Result Date: 5/14/2021  · LV: Estimated LVEF is 60 - 65%. Visually measured ejection fraction. Normal cavity size, wall thickness and systolic function (ejection fraction normal).  Wall motion: normal.           MEDICATIONS     ALL MEDICATIONS:   Current Facility-Administered Medications   Medication Dose Route Frequency    lithium carbonate CR (ESKALITH CR) tablet 450 mg  450 mg Oral Q12H    amLODIPine (NORVASC) tablet 5 mg  5 mg Oral DAILY    melatonin tablet 6 mg  6 mg Oral QHS    loperamide (IMODIUM) capsule 2 mg  2 mg Oral Q4H PRN    ezetimibe (ZETIA) tablet 10 mg  10 mg Oral DAILY    glipiZIDE (GLUCOTROL) tablet 5 mg  5 mg Oral DAILY    mycophenolate mofetil (CELLCEPT) capsule 500 mg  500 mg Oral BID    pyridostigmine (MESTINON) tablet 30 mg  30 mg Oral TID    rosuvastatin (CRESTOR) tablet 20 mg  20 mg Oral QHS    therapeutic multivitamin (THERAGRAN) tablet 1 Tab  1 Tab Oral DAILY    aspirin chewable tablet 81 mg  81 mg Oral DAILY    OLANZapine (ZyPREXA) tablet 2.5 mg  2.5 mg Oral Q6H PRN    haloperidol lactate (HALDOL) injection 2.5 mg  2.5 mg IntraMUSCular Q6H PRN    benztropine (COGENTIN) tablet 0.5 mg  0.5 mg Oral BID PRN    diphenhydrAMINE (BENADRYL) injection 25 mg  25 mg IntraMUSCular BID PRN    acetaminophen (TYLENOL) tablet 650 mg  650 mg Oral Q4H PRN    magnesium hydroxide (MILK OF MAGNESIA) 400 mg/5 mL oral suspension 30 mL  30 mL Oral DAILY PRN    cloNIDine HCL (CATAPRES) tablet 0.1 mg  0.1 mg Oral Q4H PRN    traZODone (DESYREL) tablet 50 mg  50 mg Oral QHS PRN      SCHEDULED MEDICATIONS:   Current Facility-Administered Medications   Medication Dose Route Frequency    lithium carbonate CR (ESKALITH CR) tablet 450 mg  450 mg Oral Q12H    amLODIPine (NORVASC) tablet 5 mg  5 mg Oral DAILY    melatonin tablet 6 mg  6 mg Oral QHS    ezetimibe (ZETIA) tablet 10 mg  10 mg Oral DAILY    glipiZIDE (GLUCOTROL) tablet 5 mg  5 mg Oral DAILY    mycophenolate mofetil (CELLCEPT) capsule 500 mg  500 mg Oral BID    pyridostigmine (MESTINON) tablet 30 mg  30 mg Oral TID    rosuvastatin (CRESTOR) tablet 20 mg  20 mg Oral QHS    therapeutic multivitamin (THERAGRAN) tablet 1 Tab  1 Tab Oral DAILY    aspirin chewable tablet 81 mg  81 mg Oral DAILY          ASSESSMENT & PLAN     DIAGNOSES REQUIRING ACTIVE TREATMENT AND MONITORING: (reviewed/updated 5/18/2021)  Patient Active Hospital Problem List:   Sarah    Assessment: patient floridly manic, preoccupied with his children using drugs amongst other ongoing preoccupations.  Per collateral, he has decompensated in the community, inappropriate behaviors have been noted at various public places. CT WNL, labwork unremarkable. Diff Dx includes neuropsychiatric sequelae of COVID-19 / malignancy, or steroid / SSRI induced sarah. Will observe for now, obtain further information regarding psychiatric history, consider mood stabilizer. Plan:  - Observation  - DISCONTINUE VPA due to patient preference  - CONTINUE Lithium 450 mg BID for sarah  - Li level 5/21/21  - Consider antipsychotic if delusions persist  - Consider court order  - IGM therapy as tolerated  - Expand database / obtain collateral  - Dispo planning (home)     I will continue to monitor blood levels (lithium---a drug with a narrow therapeutic index= NTI) and associated labs for drug therapy implemented that require intense monitoring for toxicity as deemed appropriate based on current medication side effects and pharmacodynamically determined drug 1/2 lives. In summary, Barbara Pulliam, is a 80 y.o.  male who presents with a severe exacerbation of the principal diagnosis of Sarah ([facility] [address]    Patient's condition is improving. Patient requires continued inpatient hospitalization for further stabilization, safety monitoring and medication management. I will continue to coordinate the provision of individual, milieu, occupational, group, and substance abuse therapies to address target symptoms/diagnoses as deemed appropriate for the individual patient. A coordinated, multidisplinary treatment team round was conducted with the patient (this team consists of the nurse, psychiatric unit pharmacist,  and writer). Complete current electronic health record for patient has been reviewed today including consultant notes, ancillary staff notes, nurses and psychiatric tech notes. Suicide risk assessment completed and patient deemed to be of low risk for suicide at this time.      The following regarding medications was addressed during rounds with patient:   the risks and benefits of the proposed medication. The patient was given the opportunity to ask questions. Informed consent given to the use of the above medications. Will continue to adjust psychiatric and non-psychiatric medications (see above \"medication\" section and orders section for details) as deemed appropriate & based upon diagnoses and response to treatment. I will continue to order blood tests/labs and diagnostic tests as deemed appropriate and review results as they become available (see orders for details and above listed lab/test results). I will order psychiatric records from previous [facility] hospitals to further elucidate the nature of patient's psychopathology and review once available. I will gather additional collateral information from friends, family and o/p treatment team to further elucidate the nature of patient's psychopathology and baselline level of psychiatric functioning. I certify that this patient's inpatient psychiatric hospital services furnished since the previous certification were, and continue to be, required for treatment that could reasonably be expected to improve the patient's condition, or for diagnostic study, and that the patient continues to need, on a daily basis, active treatment furnished directly by or requiring the supervision of inpatient psychiatric facility personnel. In addition, the hospital records show that services furnished were intensive treatment services, admission or related services, or equivalent services.     EXPECTED DISCHARGE DATE/DAY: 5/21/21     DISPOSITION: Home       Signed By:   Jeanette Pearson MD  5/18/2021

## 2021-05-18 NOTE — PROGRESS NOTES
Problem: Falls - Risk of  Goal: *Absence of Falls  Description: Document Douglas Elberta Fall Risk and appropriate interventions in the flowsheet.   Outcome: Progressing Towards Goal  Note: Fall Risk Interventions:    Medication Interventions: Teach patient to arise slowly    Problem: Manic Behavior (Adult/Pediatric)  Goal: *STG: Participates in treatment plan  Outcome: Progressing Towards Goal

## 2021-05-18 NOTE — TELEPHONE ENCOUNTER
----- Message from Phillip Singh sent at 5/18/2021 12:18 PM EDT -----  Regarding: /telephone  General Message/Vendor Calls    Caller's first and last name:Shiela, the pt's wife. Reason for call: The pt's wife stated the pt is at [facility], in the behavioral dept. She stated they asked to have his records from the office sent over. Callback required yes/no and why: Yes.       Best contact number(s):[phone]

## 2021-05-18 NOTE — PROGRESS NOTES
1935: Pt in room resting quietly. Pt denied SI/HI/AH/VH. Pt denied pain. Pt denied anxiety. Pt denied depression. Pt is aware he has labs in the morning and pt stated he would comply    2134: Pt accepted scheduled medication. Pt asked for PRN trazodone to help with sleep. Writer educated pt about melatonin helping him sleep, pt stated \"I want them at the same time please\"     0015: Pt appears asleep, PRN effective. 0344: Pt appears asleep, breathing visible. 0600: Pt accepted labs. No issues observed. Pt slept for 5 hours and 45 minutes. Problem: Falls - Risk of  Goal: *Absence of Falls  Description: Document Alma Laureanoin Fall Risk and appropriate interventions in the flowsheet.   Outcome: Progressing Towards Goal  Note: Fall Risk Interventions:            Medication Interventions: Teach patient to arise slowly                   Problem: Manic Behavior (Adult/Pediatric)  Goal: *STG: Participates in treatment plan  Outcome: Progressing Towards Goal  Goal: *STG: Demonstrates decrease in delusional thinking  Outcome: Progressing Towards Goal  Goal: *STG: Demonstrates improvement in thought process and speech pattern  Outcome: Progressing Towards Goal  Goal: *STG: Maintains appropriate boundaries  Outcome: Progressing Towards Goal  Goal: *STG: Demonstrates improvement in sleep pattern  Outcome: Progressing Towards Goal  Goal: *STG: Remains safe in hospital  Outcome: Progressing Towards Goal  Goal: *STG/LTG: Complies with medication therapy  Outcome: Progressing Towards Goal     Problem: Altered Thought Process (Adult/Pediatric)  Goal: *STG: Participates in treatment plan  Outcome: Progressing Towards Goal  Goal: *STG: Remains safe in hospital  Outcome: Progressing Towards Goal  Goal: *STG: Complies with medication therapy  Outcome: Progressing Towards Goal  Goal: *STG: Absence of lethality  Outcome: Progressing Towards Goal     Problem: Discharge Planning  Goal: *Discharge to safe environment  Outcome: Progressing Towards Goal

## 2021-05-18 NOTE — BH NOTES
GROUP THERAPY PROGRESS NOTE    Patient is participating in Substance abuse/Coping Skills group. Group time: 45 minutes    Personal goal for participation: To understand triggers, ways to avoid triggers, and what to do when triggered. Goal orientation: Personal    Group therapy participation: minimal    Therapeutic interventions reviewed and discussed: Group discussion of substance use and triggers for relapse. Patients discussed changes that may be needed and ways to address each trigger in productive ways other than using or resorting to maladaptive behaviors. Each patient was able to discuss negative emotions they experience, places and situations that trigger the negative emotions, and time of the day when they tend to experience the greatest amount of triggers. Patients without substance abuse issues where able to look at triggers for negative emotions/mental health while those with substance abuse and mental health diagnoses were able to look at both. Patients also shared how they are feeling today with the group. Impression of participation: Booker Delacruz arrived late to the group and joined for the last few minutes. He joined discussion with another patient regarding being supportive of each other and all being God's children. He did not share anything else with the group.     Kerry Tejada formerly [facility]

## 2021-05-19 PROCEDURE — 74011250637 HC RX REV CODE- 250/637: Performed by: NURSE PRACTITIONER

## 2021-05-19 PROCEDURE — 74011250637 HC RX REV CODE- 250/637: Performed by: STUDENT IN AN ORGANIZED HEALTH CARE EDUCATION/TRAINING PROGRAM

## 2021-05-19 PROCEDURE — 74011250637 HC RX REV CODE- 250/637: Performed by: PSYCHIATRY & NEUROLOGY

## 2021-05-19 PROCEDURE — 99232 SBSQ HOSP IP/OBS MODERATE 35: CPT | Performed by: PSYCHIATRY & NEUROLOGY

## 2021-05-19 PROCEDURE — 74011250636 HC RX REV CODE- 250/636: Performed by: PSYCHIATRY & NEUROLOGY

## 2021-05-19 PROCEDURE — 65220000003 HC RM SEMIPRIVATE PSYCH

## 2021-05-19 RX ADMIN — MYCOPHENOLATE MOFETIL 500 MG: 250 CAPSULE ORAL at 16:46

## 2021-05-19 RX ADMIN — TRAZODONE HYDROCHLORIDE 50 MG: 50 TABLET ORAL at 21:25

## 2021-05-19 RX ADMIN — EZETIMIBE 10 MG: 10 TABLET ORAL at 08:22

## 2021-05-19 RX ADMIN — LITHIUM CARBONATE 450 MG: 450 TABLET, EXTENDED RELEASE ORAL at 08:19

## 2021-05-19 RX ADMIN — PYRIDOSTIGMINE BROMIDE 30 MG: 60 TABLET ORAL at 12:24

## 2021-05-19 RX ADMIN — PYRIDOSTIGMINE BROMIDE 30 MG: 60 TABLET ORAL at 08:20

## 2021-05-19 RX ADMIN — ASPIRIN 81 MG CHEWABLE TABLET 81 MG: 81 TABLET CHEWABLE at 08:19

## 2021-05-19 RX ADMIN — Medication 6 MG: at 21:25

## 2021-05-19 RX ADMIN — ROSUVASTATIN CALCIUM 20 MG: 10 TABLET, FILM COATED ORAL at 21:25

## 2021-05-19 RX ADMIN — GLIPIZIDE 5 MG: 5 TABLET ORAL at 08:19

## 2021-05-19 RX ADMIN — LITHIUM CARBONATE 450 MG: 450 TABLET, EXTENDED RELEASE ORAL at 21:25

## 2021-05-19 RX ADMIN — PYRIDOSTIGMINE BROMIDE 30 MG: 60 TABLET ORAL at 16:46

## 2021-05-19 RX ADMIN — MYCOPHENOLATE MOFETIL 500 MG: 250 CAPSULE ORAL at 08:20

## 2021-05-19 RX ADMIN — THERA TABS 1 TABLET: TAB at 08:19

## 2021-05-19 NOTE — PROGRESS NOTES
Problem: Falls - Risk of  Goal: *Absence of Falls  Description: Document Anitra Junior Fall Risk and appropriate interventions in the flowsheet. Outcome: Progressing Towards Goal  Note: Fall Risk Interventions:            Medication Interventions: Teach patient to arise slowly                   Problem: Manic Behavior (Adult/Pediatric)  Goal: *STG: Participates in treatment plan  Outcome: Progressing Towards Goal  Goal: *STG: Demonstrates decrease in delusional thinking  Outcome: Progressing Towards Goal     Verbal shift change report given to Preston Domínguez.  (oncoming nurse) by Tootie Gonzalez (offgoing nurse). Report included the following information SBAR, Kardex, MAR and Recent Results. Patient denies SI/HI/AVH. Patient denies anxiety and depression. Patient visible on unit. Patient medication and diet compliant.

## 2021-05-19 NOTE — BH NOTES
GROUP THERAPY PROGRESS NOTE    Patient is participating in Coping Skills group. Group time: 1 hour    Personal goal for participation: To assess current protective factors and discuss building adequate holistic protective factors to improve ability to cope with mental health challenges and symptoms. Goal orientation: Personal    Group therapy participation: passive    Therapeutic interventions reviewed and discussed:  Patients were provided psychoeducation on the importance of controlled and holistic protective factors. Patients completed a Protective Factors worksheet to assess how strong their controlled protective factors are in their social supports, physical health, self-esteem, coping skills, sense of purpose and healthy thinking. Patients discussed results of assessment and processed how protective factors have been valuable to them during mental health crisis. Patients also discussed which protective factors they would like to improve, how things may be different if they were able to improve these factors and identified specific action steps to help make this goal a reality. Impression of participation: Pt entered group at the end of session, tangential, hyperverbal, polite, tangential thought process. Pt intrusive at times, talking about other's patients skills, talents and personal information without being solicited to. Pt easily redirected.     AJ Alonzo

## 2021-05-19 NOTE — GROUP NOTE
BRADLY  GROUP DOCUMENTATION INDIVIDUAL Group Therapy Note Date: 5/19/2021 Group Start Time: 0900 Group End Time: 1000 Group Topic: Topic Group [facility] 3 ACUTE BEHAV [facility], [address] GROUP DOCUMENTATION GROUP Group Therapy Note Attendees: 10 Attendance: Did not attend Patient's Goal: Interventions/techniques: 
Climmie Glance

## 2021-05-19 NOTE — PROGRESS NOTES
1944-Paulo noted in the hallway. He is polite and pleasant. He reports having a good day. He presents A&O x4. He denies current SI/HI/AVH, depression, anxiety, and pain. He reports having a bm today. 2200-Paulo is in bed resting with his eyes closed and positioned on his left side. 0000-Paulo is resting in bed, eyes closed, positioned on his right side. Breathing even and unlabored. 0215-Paulo is in bed resting with his eyes closed, positioned on his right side. 0400- Michelle Taylor is in bed resting quietly, positioned on his right side. No distress noted. Will continue to monitor foe safety.

## 2021-05-19 NOTE — GROUP NOTE
BRADLY  GROUP DOCUMENTATION INDIVIDUAL Group Therapy Note Date: 5/19/2021 Group Start Time: 1100 Group End Time: 1200 Group Topic: Topic Group [facility] 3 ACUTE BEHAV [facility] [place], [address] [facility] GROUP DOCUMENTATION GROUP Group Therapy Note Attendees: 8 Attendance: Did not attend Patient's Goal: Interventions/techniques: Steve Bradley

## 2021-05-19 NOTE — PROGRESS NOTES
Behavioral Services                                              Medicare Re-Certification    I certify that the inpatient psychiatric hospital services furnished since the previous certification/re-certification were, and continue to be, medically necessary for;    [x] (1) Treatment which could reasonably be expected to improve the patient's condition,    [x] (2) Or for diagnostic study. Estimated length of stay/service 5-7 days    Plan for post-hospital care home    This patient continues to need, on a daily basis, active treatment furnished directly by or requiring the supervision of inpatient psychiatric personnel.     Electronically signed by Job Dias MD on 5/19/2021 at 10:28 AM

## 2021-05-19 NOTE — GROUP NOTE
BRADLY  GROUP DOCUMENTATION INDIVIDUAL Group Therapy Note Date: 5/19/2021 Group Start Time: 1500 Group End Time: 0054 Group Topic: Recreational/Music Therapy [facility] 3 ACUTE BEHAV [facility], [address] GROUP DOCUMENTATION GROUP Group Therapy Note Attendees:  9 Attendance: Did not attend Patient's Goal: Interventions/techniques: Wali Fonseca

## 2021-05-19 NOTE — BH NOTES
GROUP THERAPY PROGRESS NOTE    Patient did not participate in Coping Skills group.      Vincenzo Henao MSW

## 2021-05-19 NOTE — BH NOTES
GROUP THERAPY PROGRESS NOTE    Patient did not participate in Discharge Planning Group.      Lucille Granda LPC [facility]

## 2021-05-19 NOTE — BH NOTES
Behavioral Health Treatment Team Note         Patient goal(s) for today: TDO hearing and take medications as prescribed.   Treatment team focus/goals: Monitoring and medication adjustments.   Progress note:  Pt was seen in treatment team this morning. Pt is alert and oriented. Pt denies SI/HI. Pt's mood is less anxious, affect is brighter.  Pt's thought process is coherent with less paranoid delusions.  Pt's insight and judgment is fair, reliability is fair.  MD approved a visit from wife on Wednesday 5/19 at 6:00PM.  Social work department will continue to coordinate discharge plans.       LOS:  7                        Expected LOS: 5/21?     Financial concerns/prescription coverage: [employer] Medicare Part A&B  Date of last family contact: Mis - wife 5/17                           Family requesting physician contact today:  Yes - MD notified 5/18 that wife has specific questions she wants to speak directly to him about.   Discharge plan: Home  Guns in the home: None involved.                                                       Outpatient provider(s): To be linked.      Participating treatment team AJ Powers and Dr. Sohail Vines MD.

## 2021-05-20 ENCOUNTER — APPOINTMENT (OUTPATIENT)
Dept: MRI IMAGING | Age: 71
DRG: 885 | End: 2021-05-20
Attending: PSYCHIATRY & NEUROLOGY
Payer: MEDICARE

## 2021-05-20 PROCEDURE — 74011250636 HC RX REV CODE- 250/636: Performed by: PSYCHIATRY & NEUROLOGY

## 2021-05-20 PROCEDURE — 70551 MRI BRAIN STEM W/O DYE: CPT

## 2021-05-20 PROCEDURE — 90785 PSYTX COMPLEX INTERACTIVE: CPT | Performed by: PSYCHIATRY & NEUROLOGY

## 2021-05-20 PROCEDURE — 65220000003 HC RM SEMIPRIVATE PSYCH

## 2021-05-20 PROCEDURE — 90833 PSYTX W PT W E/M 30 MIN: CPT | Performed by: PSYCHIATRY & NEUROLOGY

## 2021-05-20 PROCEDURE — 99231 SBSQ HOSP IP/OBS SF/LOW 25: CPT | Performed by: PSYCHIATRY & NEUROLOGY

## 2021-05-20 PROCEDURE — 74011250637 HC RX REV CODE- 250/637: Performed by: NURSE PRACTITIONER

## 2021-05-20 PROCEDURE — 74011250637 HC RX REV CODE- 250/637: Performed by: PSYCHIATRY & NEUROLOGY

## 2021-05-20 PROCEDURE — 74011250637 HC RX REV CODE- 250/637: Performed by: STUDENT IN AN ORGANIZED HEALTH CARE EDUCATION/TRAINING PROGRAM

## 2021-05-20 RX ADMIN — ASPIRIN 81 MG CHEWABLE TABLET 81 MG: 81 TABLET CHEWABLE at 09:15

## 2021-05-20 RX ADMIN — PYRIDOSTIGMINE BROMIDE 30 MG: 60 TABLET ORAL at 11:54

## 2021-05-20 RX ADMIN — THERA TABS 1 TABLET: TAB at 09:15

## 2021-05-20 RX ADMIN — LITHIUM CARBONATE 450 MG: 450 TABLET, EXTENDED RELEASE ORAL at 20:37

## 2021-05-20 RX ADMIN — PYRIDOSTIGMINE BROMIDE 30 MG: 60 TABLET ORAL at 09:15

## 2021-05-20 RX ADMIN — Medication 6 MG: at 20:39

## 2021-05-20 RX ADMIN — PYRIDOSTIGMINE BROMIDE 30 MG: 60 TABLET ORAL at 17:11

## 2021-05-20 RX ADMIN — GLIPIZIDE 5 MG: 5 TABLET ORAL at 09:15

## 2021-05-20 RX ADMIN — ROSUVASTATIN CALCIUM 20 MG: 10 TABLET, FILM COATED ORAL at 20:38

## 2021-05-20 RX ADMIN — EZETIMIBE 10 MG: 10 TABLET ORAL at 09:15

## 2021-05-20 RX ADMIN — TRAZODONE HYDROCHLORIDE 50 MG: 50 TABLET ORAL at 20:39

## 2021-05-20 RX ADMIN — MYCOPHENOLATE MOFETIL 500 MG: 250 CAPSULE ORAL at 09:15

## 2021-05-20 RX ADMIN — LITHIUM CARBONATE 450 MG: 450 TABLET, EXTENDED RELEASE ORAL at 09:15

## 2021-05-20 RX ADMIN — MYCOPHENOLATE MOFETIL 500 MG: 250 CAPSULE ORAL at 16:35

## 2021-05-20 NOTE — BH NOTES
Behavioral Health Treatment Team Note         Patient goal(s) for today: TDO hearing and take medications as prescribed.   Treatment team focus/goals: Monitoring and medication adjustments.   Progress note:  Pt was seen in treatment team this morning. Pt is alert and oriented. Pt denies SI/HI. Pt's mood is less anxious, lesslabile, affect is brighter. Pt's thought process is coherent. Pt's insight and judgment is fair, reliability is fair. Pt to participate in family meeting on Friday at noon and discharge after. CARLOS A updated [facility] and pt to begin outpatient services with [facility] upon discharge.  Social work department will continue to coordinate discharge plans.       LOS:  8                       Expected LOS: 5/21     Financial concerns/prescription coverage: [employer] Medicare Part A&B  Date of last family contact: Mis - wife 5/18                          Family requesting physician contact today:  Yes -MD spoke with wife 5/20  Discharge plan: Home  Guns in the home: None involved.                                                       Outpatient provider(s): To be linked to [facility]     Participating treatment team AJ Gardner and Dr. Magan Perez MD.

## 2021-05-20 NOTE — PROGRESS NOTES
Problem: Manic Behavior (Adult/Pediatric)  Goal: *STG: Participates in treatment plan  Outcome: Progressing Towards Goal  Goal: *STG: Demonstrates improvement in thought process and speech pattern  Outcome: Progressing Towards Goal  Goal: *STG: Demonstrates improvement in sleep pattern  Outcome: Progressing Towards Goal  Goal: *STG: Attends activities and groups  Outcome: Progressing Towards Goal  Goal: *LTG: Verbalizes insights regarding recovery  Outcome: Progressing Towards Goal     Problem: Altered Thought Process (Adult/Pediatric)  Goal: *STG: Complies with medication therapy  Outcome: Progressing Towards Goal       0700: Shift change report given to Tootie VALENTINE (oncoming nurse) by Randy Christopher  (offgoing nurse). Report included the following information SBAR, Kardex, MAR and Recent Results. 7126-8363: Assumed care of patient. Met patient in room. Night shift RN, Julien Romero., doing MRI screening questionnaire with patient. VS taken. Patient denied anxiety, depression, SI, HI, AVH and pain. Patient med and meal compliant. Scheduled amlodipine, patients . Patient calm and pleasant during assessment. 9212-2255: Patient in bed sleeping. No signs of distress noted. Medication compliant. 5586-6478: Patient meal compliant. Patient awake in bed, resting quietly. No voiced concerns at this time. 5880-1051: Patient taken down to 1st floor for MRI. Patient with staff and security. 9312-2009: Patient returned to unit. Patient tolerated well. Medication and meal compliant. 6909-6446: Patient awake in bed quiet. No issues noted.

## 2021-05-20 NOTE — BH NOTES
GROUP THERAPY PROGRESS NOTE    Patient did not participate in Substance abuse group.      Jessica Slider LPC LSATP [facility]

## 2021-05-20 NOTE — PROGRESS NOTES
2000: Assumed care of patient after receiving shift report from outgoing nurse. 2200: Elizabeth Jameson is visible on the unit interacting with peers and staff. Pt makes good eye contact. A&O x 4. Affect is animated, mood is pleasant. Hygiene is adequate, independent in ADLs. Gait is steady. Sleep and appetite patterns WNL. Denies SI/HI and demonstrates no evidence of intent to harm self or others. Denies hallucinations at present. Pt encouraged to continue to participate in care. Trazodone given as sleep aid at 2125. Will continue to monitor pt with q 15 min checks. 2230: Patient asleep in bed with even respirations. 0345: Patient awake and requesting snack. Provided with ginger ale and crackers. 0700: Patient slept 7 hours with even respirations.         Problem: Manic Behavior (Adult/Pediatric)  Goal: *STG: Demonstrates improvement in thought process and speech pattern  Outcome: Progressing Towards Goal  Goal: *STG: Maintains appropriate boundaries  Outcome: Progressing Towards Goal  Goal: *STG: Demonstrates improvement in sleep pattern  Outcome: Progressing Towards Goal

## 2021-05-20 NOTE — GROUP NOTE
BRADLY  GROUP DOCUMENTATION INDIVIDUAL Group Therapy Note Date: 5/20/2021 Group Start Time: 1100 Group End Time: 1200 Group Topic: Topic Group [facility] [place] 3 ACUTE BEHAV [facility] [place], [address] [facility] GROUP DOCUMENTATION GROUP Group Therapy Note Attendees: 10 Attendance: Did not attend Patient's Goal: Interventions/techniquesMinnie Call

## 2021-05-20 NOTE — BH NOTES
PSYCHIATRIC PROGRESS NOTE         Patient Name  Darlene Heck   Date of Birth 1950   [facility] [id number]   Medical Record Number  [id number]      Age  80 y.o. PCP Ryanne Godinez MD   Admit date:  5/12/2021    Room Number  324/02  @ [facility]   Date of Service  5/20/2021         E & M PROGRESS NOTE:         HISTORY       CC:  \"nick\"  HISTORY OF PRESENT ILLNESS/INTERVAL HISTORY:  (reviewed/updated 5/20/2021). per initial evaluation: The patient, Darlene Heck, is a 80 y.o. WHITE male with a past psychiatric history significant for major depressive disorder, who presents at this time with complaints of (and/or evidence of) the following emotional symptoms: delusions and nick. Additional symptomatology include poor self care. The above symptoms have been present for 2+ weeks. These symptoms are of moderate to high severity. These symptoms are constant in nature. The patient's condition has been precipitated by psychosocial stressors. Patient's condition made worse by treatment noncompliance. UDS: negative; BAL=0.      The patient is a tangential historian. The patient corroborates the above narrative. The patient contracts for safety on the unit and gives consent for the team to contact collateral. The patient is amenable to initiating treatment while on the unit. On interview, the patient is grossly manic, preoccupied with his son potentially using marijuana and the legality of the substance in general. He speaks at length, and is focused on discharge. He has some insight into how he is presenting as he keeps staying 'this all seems crazy I know.' He at times accusing the staff of holding him illegally, threatens to zion MD for his alf and does not voice understanding of the TDO process. He is otherwise euphoric with ongoing difficulty to redirect.  After interview, patient returns to treatment team demanding someone call his local political official to help get him out of the hospital. Patient given access to the unit phone to speak with friends and family. 5/14 - patient remains labile but euphoric. He is grossly manic, and slept 5 hours overnight. Patient noted to have an episode of chest pain, but cardiac workup was negative. Patient continues to perseverate on the need to stop the proliferation of marijuana use amongst young people. Patient ambivalent about starting mood stabilizer, stating he will do so only on the advice of his PCP. MD reached out to patient's PCP Dr. Yosef Lim ([phone]) who was in agreement with the plan to start a mood stabilizer. PCP states that patient's wife called him out of concern, followed by the patient who advised the doctor to discount his wife's concern. 5/15 - \"You're as ugly as homemade sin. \" John Martinez reports feeling fine today. He has been refusing medications and has a very limited insight. He slept 8 1/2 hours. He is disorganized and irritable on interview. 5/16 Maria Samuel reports feeling okay today. He is highly focused on discharge. \"I have less than 24 hours. \" We discussed that he may not in fact be discharged tomorrow but he seemed to dismiss this possibility. He reports improved sleep with melatonin. He denies suicidal thoughts today. No other concerns noted. 5/17 - patient has been grandiose, hyperverbal, still with poor insight into the reason for admission. Patient slept 9 hours with melatonin and trazodone, given Zyprexa several times over the weekend. Patient down for echocardiogram this morning. Patient improved per nursing, but grossly irritable and discharge focused. Patient remains preoccupied with drug use amongst young people. Medication compliance has been tenuous; he refused first dose of VPA, took second dose and refused third stating it was giving him diarrhea. After a discussion of risk and benefit, the patient agrees to a trial of lithium instead of VPA.  He states he needs a visit from his  prior to this and spontaneously declares he will be going on a hunger strike. 5/18 - patient slept 9 hours overnight. He is notably more calm this morning. Patient got VPA dose last night, but is on lithium only as of this morning. He c/o diarrhea and stomach upset, due to the evening dose of VPA being given. Patient medication compliant, he speaks at length about his ongoing suspicions toward his family, notably his son and his son's friends, regarding substance use; these statements are illogical but coherent. Patient denies SI/HI/AVH; he is discharge focused and planning on appealing his commitment tomorrow. 5/19 - patient slept 5 hours overnight. He remains preoccupied with his family and substance abuse concerns in youth, but is less labile and able to voice his concerns. Patient denies SI/HI/AVH/PI. He is medication compliant with lithium, and denies any GI symptoms. Patient's wife will be visiting this afternoon. He is hoping that she will help with some business concerns that he wants to address. Patient otherwise in fair behavioral control, he is discharge focused; he agrees to MRI this afternoon. 5/20 - the patient slept 8 hours overnight. He is more cooperative and appropriate but still is preoccupied with drugs and his family and friends using them. He is medication compliant, was visited by his wife yesterday and was appropriate, though she acknowledges to SW that he is not yet at his baseline. Patient for MRI today, and will have Li level tomorrow with a family session planned prior to discharge. SIDE EFFECTS: (reviewed/updated 5/20/2021)  None reported or admitted to. ALLERGIES:(reviewed/updated 5/20/2021)  Allergies   Allergen Reactions    Codeine Nausea Only    Valproic Acid Diarrhea      MEDICATIONS PRIOR TO ADMISSION:(reviewed/updated 5/20/2021)  Medications Prior to Admission   Medication Sig    glipiZIDE SR (GLUCOTROL XL) 5 mg CR tablet Take 5 mg by mouth daily. Indications: type 2 diabetes mellitus    therapeutic multivitamin (THERAGRAN) tablet Take 1 Tab by mouth daily.  predniSONE (DELTASONE) 20 mg tablet Take 20 mg by mouth daily.  escitalopram oxalate (LEXAPRO) 20 mg tablet Take 20 mg by mouth daily. Indications: major depressive disorder    lisinopril-hydroCHLOROthiazide (PRINZIDE, ZESTORETIC) 20-25 mg per tablet Take 1 Tab by mouth daily. Indications: high blood pressure    traZODone (DESYREL) 50 mg tablet Take 50 mg by mouth nightly as needed for Sleep.  ezetimibe (ZETIA) 10 mg tablet Take 10 mg by mouth daily. Indications: excessive fat in the blood    rosuvastatin (CRESTOR) 20 mg tablet Take 20 mg by mouth nightly. Indications: excessive fat in the blood    mycophenolate (CELLCEPT) 500 mg tablet Take 1 Tab by mouth two (2) times a day.  pyridostigmine (MESTINON) 60 mg tablet 1/2 tab three times daily (Patient taking differently: Take 30 mg by mouth three (3) times daily. 1/2 tab three times daily  Indications: myasthenia gravis, a skeletal muscle disorder)      PAST MEDICAL HISTORY: Past medical history from the initial psychiatric evaluation has been reviewed (reviewed/updated 5/20/2021) with no additional updates (I asked patient and no additional past medical history provided).    Past Medical History:   Diagnosis Date    Anxiety disorder     Cancer ([facility] [address]     PROSTATE; [facility]    COVID-19 01/01/2021    Diabetes ([facility] [address]     type II    GERD (gastroesophageal reflux disease)     Hypertension     Neurological disorder      Past Surgical History:   Procedure Laterality Date    HX OTHER SURGICAL      Horse Accident-surgery to face for fx    HX OTHER SURGICAL Right     Ear Surgery post facial surgery for scar tissue removal    HX UROLOGICAL        SOCIAL HISTORY: Social history from the initial psychiatric evaluation has been reviewed (reviewed/updated 5/20/2021) with no additional updates (I asked patient and no additional social history provided). Social History     Socioeconomic History    Marital status:      Spouse name: Not on file    Number of children: Not on file    Years of education: Not on file    Highest education level: Not on file   Occupational History    Not on file   Tobacco Use    Smoking status: Never Smoker    Smokeless tobacco: Never Used   Vaping Use    Vaping Use: Never used   Substance and Sexual Activity    Alcohol use: Yes     Comment: 1 drink 3x a week    Drug use: No    Sexual activity: Yes     Partners: Female   Other Topics Concern     Service Not Asked    Blood Transfusions Not Asked    Caffeine Concern Not Asked    Occupational Exposure Not Asked    Hobby Hazards Not Asked    Sleep Concern Not Asked    Stress Concern Not Asked    Weight Concern Not Asked    Special Diet Not Asked    Back Care Not Asked    Exercise Not Asked    Bike Helmet Not Asked   [address] Not Asked    Self-Exams Not Asked   Social History Narrative    Not on file     Social Determinants of Health     Financial Resource Strain:     Difficulty of Paying Living Expenses:    Food Insecurity:     Worried About Running Out of Food in the Last Year:     Ran Out of Food in the Last Year:    Transportation Needs:     Lack of Transportation (Medical):      Lack of Transportation (Non-Medical):    Physical Activity:     Days of Exercise per Week:     Minutes of Exercise per Session:    Stress:     Feeling of Stress :    Social Connections:     Frequency of Communication with Friends and Family:     Frequency of Social Gatherings with Friends and Family:     Attends Zoroastrian Services:     Active Member of Clubs or Organizations:     Attends Club or Organization Meetings:     Marital Status:    Intimate Partner Violence:     Fear of Current or Ex-Partner:     Emotionally Abused:     Physically Abused:     Sexually Abused:       FAMILY HISTORY: Family history from the initial psychiatric evaluation has been reviewed (reviewed/updated 5/20/2021) with no additional updates (I asked patient and no additional family history provided). Family History   Problem Relation Age of Onset    Cancer Brother         LUNG; THROAT    Anesth Problems Neg Hx        REVIEW OF SYSTEMS: (reviewed/updated 5/20/2021)  Appetite:no change from normal   Sleep: poor with DIMS (difficulty initiating & maintaining sleep)   All other Review of Systems: Negative except per HPI         [facility] (MSE):    MSE FINDINGS ARE WITHIN NORMAL LIMITS (WNL) UNLESS OTHERWISE STATED BELOW. ( ALL OF THE BELOW CATEGORIES OF THE MSE HAVE BEEN REVIEWED (reviewed 5/20/2021) AND UPDATED AS DEEMED APPROPRIATE )  General Presentation age appropriate, cooperative   Orientation oriented to time, place and person   Vital Signs  See below (reviewed 5/20/2021); Vital Signs (BP, Pulse, & Temp) are within normal limits if not listed below.    Gait and Station Stable/steady, no ataxia   Musculoskeletal System No extrapyramidal symptoms (EPS); no abnormal muscular movements or Tardive Dyskinesia (TD); muscle strength and tone are within normal limits   Language No aphasia or dysarthria   Speech:  non-pressured   Thought Processes coherent; normal rate of thoughts; fair abstract reasoning/computation   Thought Associations tangential   Thought Content preoccupations   Suicidal Ideations none   Homicidal Ideations none   Mood:  euthymic   Affect:  full range and increased in intensity   Memory recent  intact   Memory remote:  intact   Concentration/Attention:  intact   Fund of Knowledge average   Insight:  poor   Reliability fair   Judgment:  improving          VITALS:     Patient Vitals for the past 24 hrs:   Temp Pulse Resp BP SpO2   05/20/21 0800 98.2 °F (36.8 °C) 74 18 114/65 97 %   05/19/21 2030 98.7 °F (37.1 °C) 87 16 (!) 97/52 98 %     Wt Readings from Last 3 Encounters:   05/17/21 71.7 kg (158 lb)   05/20/21 71 kg (156 lb 9.6 oz)   05/12/21 72.6 kg (160 lb)     Temp Readings from Last 3 Encounters:   05/20/21 98.2 °F (36.8 °C)   05/12/21 98.8 °F (37.1 °C)   07/20/20 98.3 °F (36.8 °C)     BP Readings from Last 3 Encounters:   05/20/21 114/65   05/12/21 (!) 142/100   04/21/21 (!) 154/84     Pulse Readings from Last 3 Encounters:   05/20/21 74   05/12/21 94   04/21/21 76            DATA     LABORATORY DATA:(reviewed/updated 5/20/2021)  No results found for this or any previous visit (from the past 24 hour(s)). Lab Results   Component Value Date/Time    Valproic acid 44 (L) 05/18/2021 05:57 AM     No results found for: LITHM   RADIOLOGY REPORTS:(reviewed/updated 5/20/2021)  Ct Head Wo Cont    Result Date: 5/12/2021  INDICATION: Altered mental status Exam: Noncontrast CT of the brain is performed with 5 mm collimation. CT dose reduction was achieved with the use of the standardized protocol tailored for this examination and automatic exposure control for dose modulation. Direct comparison is made to prior MR dated 10/2019. FINDINGS: There is no acute intracranial hemorrhage, mass, mass effect or herniation. Ventricular system is normal. The gray-white matter differentiation is well-preserved. The mastoid air cells are well pneumatized. No acute intracranial hemorrhage, mass or infarct. Xr Chest Port    Result Date: 5/13/2021  EXAM:  XR CHEST PORT INDICATION:  chest pain COMPARISON:  2019 FINDINGS: A portable AP radiograph of the chest was obtained at 1643 hours. . Lungs are clear of an acute process. There is question of a small nodule right midlung. .  The cardiac and mediastinal contours and pulmonary vascularity are normal.  There is degenerative spurring mid lower thoracic spine. 1. Lungs are clear of an acute process. 2. There is question of a nodule in the right midlung versus a nipple shadow. PA and lateral views with nipple markers is recommended for further assessment.      Echo Adult Complete    Result Date: 5/14/2021  · LV: Estimated LVEF is 60 - 65%. Visually measured ejection fraction. Normal cavity size, wall thickness and systolic function (ejection fraction normal).  Wall motion: normal.           MEDICATIONS     ALL MEDICATIONS:   Current Facility-Administered Medications   Medication Dose Route Frequency    lithium carbonate CR (ESKALITH CR) tablet 450 mg  450 mg Oral Q12H    amLODIPine (NORVASC) tablet 5 mg  5 mg Oral DAILY    melatonin tablet 6 mg  6 mg Oral QHS    loperamide (IMODIUM) capsule 2 mg  2 mg Oral Q4H PRN    ezetimibe (ZETIA) tablet 10 mg  10 mg Oral DAILY    glipiZIDE (GLUCOTROL) tablet 5 mg  5 mg Oral DAILY    mycophenolate mofetil (CELLCEPT) capsule 500 mg  500 mg Oral BID    pyridostigmine (MESTINON) tablet 30 mg  30 mg Oral TID    rosuvastatin (CRESTOR) tablet 20 mg  20 mg Oral QHS    therapeutic multivitamin (THERAGRAN) tablet 1 Tab  1 Tablet Oral DAILY    aspirin chewable tablet 81 mg  81 mg Oral DAILY    OLANZapine (ZyPREXA) tablet 2.5 mg  2.5 mg Oral Q6H PRN    haloperidol lactate (HALDOL) injection 2.5 mg  2.5 mg IntraMUSCular Q6H PRN    benztropine (COGENTIN) tablet 0.5 mg  0.5 mg Oral BID PRN    diphenhydrAMINE (BENADRYL) injection 25 mg  25 mg IntraMUSCular BID PRN    acetaminophen (TYLENOL) tablet 650 mg  650 mg Oral Q4H PRN    magnesium hydroxide (MILK OF MAGNESIA) 400 mg/5 mL oral suspension 30 mL  30 mL Oral DAILY PRN    cloNIDine HCL (CATAPRES) tablet 0.1 mg  0.1 mg Oral Q4H PRN    traZODone (DESYREL) tablet 50 mg  50 mg Oral QHS PRN      SCHEDULED MEDICATIONS:   Current Facility-Administered Medications   Medication Dose Route Frequency    lithium carbonate CR (ESKALITH CR) tablet 450 mg  450 mg Oral Q12H    amLODIPine (NORVASC) tablet 5 mg  5 mg Oral DAILY    melatonin tablet 6 mg  6 mg Oral QHS    ezetimibe (ZETIA) tablet 10 mg  10 mg Oral DAILY    glipiZIDE (GLUCOTROL) tablet 5 mg  5 mg Oral DAILY    mycophenolate mofetil (CELLCEPT) capsule 500 mg  500 mg Oral BID    pyridostigmine (MESTINON) tablet 30 mg  30 mg Oral TID    rosuvastatin (CRESTOR) tablet 20 mg  20 mg Oral QHS    therapeutic multivitamin (THERAGRAN) tablet 1 Tab  1 Tablet Oral DAILY    aspirin chewable tablet 81 mg  81 mg Oral DAILY          ASSESSMENT & PLAN     DIAGNOSES REQUIRING ACTIVE TREATMENT AND MONITORING: (reviewed/updated 5/20/2021)  Patient Active Hospital Problem List:   Sarah    Assessment: patient floridly manic, preoccupied with his children using drugs amongst other ongoing preoccupations. Per collateral, he has decompensated in the community, inappropriate behaviors have been noted at various public places. CT WNL, labwork unremarkable. Diff Dx includes neuropsychiatric sequelae of COVID-19 / malignancy, or steroid / SSRI induced sarah. Will observe for now, obtain further information regarding psychiatric history, consider mood stabilizer. Plan:  - Observation  - CONTINUE Lithium 450 mg BID for sarah  - Li level 5/21/21  - f/u MRI brain wo contrast  - IGM therapy as tolerated  - Expand database / obtain collateral  - Dispo planning (home)     I will continue to monitor blood levels (lithium---a drug with a narrow therapeutic index= NTI) and associated labs for drug therapy implemented that require intense monitoring for toxicity as deemed appropriate based on current medication side effects and pharmacodynamically determined drug 1/2 lives. In summary, Alyssa Varela, is a 80 y.o.  male who presents with a severe exacerbation of the principal diagnosis of Sarah ([facility] [address]    Patient's condition is improving. Patient requires continued inpatient hospitalization for further stabilization, safety monitoring and medication management. I will continue to coordinate the provision of individual, milieu, occupational, group, and substance abuse therapies to address target symptoms/diagnoses as deemed appropriate for the individual patient.   A coordinated, multidisplinary treatment team round was conducted with the patient (this team consists of the nurse, psychiatric unit pharmacist,  and writer). Complete current electronic health record for patient has been reviewed today including consultant notes, ancillary staff notes, nurses and psychiatric tech notes. Suicide risk assessment completed and patient deemed to be of low risk for suicide at this time. The following regarding medications was addressed during rounds with patient:   the risks and benefits of the proposed medication. The patient was given the opportunity to ask questions. Informed consent given to the use of the above medications. Will continue to adjust psychiatric and non-psychiatric medications (see above \"medication\" section and orders section for details) as deemed appropriate & based upon diagnoses and response to treatment. I will continue to order blood tests/labs and diagnostic tests as deemed appropriate and review results as they become available (see orders for details and above listed lab/test results). I will order psychiatric records from previous [facility] hospitals to further elucidate the nature of patient's psychopathology and review once available. I will gather additional collateral information from friends, family and o/p treatment team to further elucidate the nature of patient's psychopathology and baselline level of psychiatric functioning. I certify that this patient's inpatient psychiatric hospital services furnished since the previous certification were, and continue to be, required for treatment that could reasonably be expected to improve the patient's condition, or for diagnostic study, and that the patient continues to need, on a daily basis, active treatment furnished directly by or requiring the supervision of inpatient psychiatric facility personnel.  In addition, the hospital records show that services furnished were intensive treatment services, admission or related services, or equivalent services.     EXPECTED DISCHARGE DATE/DAY: 5/21/21     DISPOSITION: Home       Signed By:   Yaa Mccauley MD  5/20/2021

## 2021-05-20 NOTE — GROUP NOTE
BRADLY  GROUP DOCUMENTATION INDIVIDUAL Group Therapy Note Date: 5/20/2021 Group Start Time: 1500 Group End Time: 6047 Group Topic: Recreational/Music Therapy [facility] [place] [address] ACUTE BEHAV [facility] [place], [address] GROUP DOCUMENTATION GROUP Group Therapy Note Attendees: 11 Attendance: Did not attend Patient's Goal: Interventions/techniquesMinnie Call

## 2021-05-20 NOTE — GROUP NOTE
BRADLY  GROUP DOCUMENTATION INDIVIDUAL Group Therapy Note Date: 5/20/2021 Group Start Time: 0900 Group End Time: 1000 Group Topic: Topic Group [facility] [place] [address] ACUTE BEHAV [facility] [place], [address] GROUP DOCUMENTATION GROUP Group Therapy Note Attendees: 8 Attendance: Did not attend Patient's Goal: Interventions/technique Vidya Méndez

## 2021-05-20 NOTE — BH NOTES
PSYCHOTHERAPY SESSION NOTE     Patient Name  Michelle Oakes   Date of Birth 1950   [facility] [id number]   Medical Record Number  [id number]      Age  80 y.o. PCP Zoila Starks MD   Admit date:  5/12/2021    Room Number  324/02  @ [facility]   Date of Service  5/20/2021            Length of psychotherapy session: 30 minutes    Main condition/diagnosis/issues treated during session today, 5/20/2021 : nick    I employed Cognitive Behavioral therapy techniques, Reality-Oriented psychotherapy, as well as supportive psychotherapy in regards to various ongoing psychosocial stressors, including the following: pre-admission and current problems; occupational issues; family stressors; medical issues; and stress of hospitalization. Interpersonal relationship issues and psychodynamic conflicts explored. Attempts made to alleviate maladaptive patterns. Session focused on the patient's ongoing crusade against substance abuse in the community; healthy ways to enact change and make his thoughts known vs the more provocative behaviors that resulted in this hospitalization. The patient acknowledges his bizzare behavior but does not feel any differently about the underlying concerns that prompted much of this episode. Overall, patient is progressing. Treatment Plan Update (reviewed and updated 5/20/2021) : I will modify psychotherapy tx plan by implementing more stress management strategies, building upon cognitive behavioral techniques, increasing coping skills, as well as shoring up psychological defenses). An extended energy and skill set was needed to engage pt in psychotherapy due to some of the following: resistiveness, complexity, negativity, confrontational nature, hostile behaviors, and/or severe abnormalities in thought processes/psychosis resulting in the loss of expressive/receptive language communication skills.

## 2021-05-20 NOTE — BH NOTES
PSYCHIATRIC PROGRESS NOTE         Patient Name  Lia Demarco   Date of Birth 1950   [facility] [id number]   Medical Record Number  [id number]      Age  80 y.o. PCP Farrah Mallory MD   Admit date:  5/12/2021    Room Number  324/02  @ [facility]   Date of Service  5/19/2021         E & M PROGRESS NOTE:         HISTORY       CC:  \"nick\"  HISTORY OF PRESENT ILLNESS/INTERVAL HISTORY:  (reviewed/updated 5/19/2021). per initial evaluation: The patient, Lia Demarco, is a 80 y.o. WHITE male with a past psychiatric history significant for major depressive disorder, who presents at this time with complaints of (and/or evidence of) the following emotional symptoms: delusions and nick. Additional symptomatology include poor self care. The above symptoms have been present for 2+ weeks. These symptoms are of moderate to high severity. These symptoms are constant in nature. The patient's condition has been precipitated by psychosocial stressors. Patient's condition made worse by treatment noncompliance. UDS: negative; BAL=0.      The patient is a tangential historian. The patient corroborates the above narrative. The patient contracts for safety on the unit and gives consent for the team to contact collateral. The patient is amenable to initiating treatment while on the unit. On interview, the patient is grossly manic, preoccupied with his son potentially using marijuana and the legality of the substance in general. He speaks at length, and is focused on discharge. He has some insight into how he is presenting as he keeps staying 'this all seems crazy I know.' He at times accusing the staff of holding him illegally, threatens to zion MD for his MCFP and does not voice understanding of the TDO process. He is otherwise euphoric with ongoing difficulty to redirect.  After interview, patient returns to treatment team demanding someone call his local political official to help get him out of the hospital. Patient given access to the unit phone to speak with friends and family. 5/14 - patient remains labile but euphoric. He is grossly manic, and slept 5 hours overnight. Patient noted to have an episode of chest pain, but cardiac workup was negative. Patient continues to perseverate on the need to stop the proliferation of marijuana use amongst young people. Patient ambivalent about starting mood stabilizer, stating he will do so only on the advice of his PCP. MD reached out to patient's PCP Dr. Mart Birmingham ([phone]) who was in agreement with the plan to start a mood stabilizer. PCP states that patient's wife called him out of concern, followed by the patient who advised the doctor to discount his wife's concern. 5/15 - \"You're as ugly as homemade sin. \" Sheldon Lennon reports feeling fine today. He has been refusing medications and has a very limited insight. He slept 8 1/2 hours. He is disorganized and irritable on interview. 5/16 Pavel Fan reports feeling okay today. He is highly focused on discharge. \"I have less than 24 hours. \" We discussed that he may not in fact be discharged tomorrow but he seemed to dismiss this possibility. He reports improved sleep with melatonin. He denies suicidal thoughts today. No other concerns noted. 5/17 - patient has been grandiose, hyperverbal, still with poor insight into the reason for admission. Patient slept 9 hours with melatonin and trazodone, given Zyprexa several times over the weekend. Patient down for echocardiogram this morning. Patient improved per nursing, but grossly irritable and discharge focused. Patient remains preoccupied with drug use amongst young people. Medication compliance has been tenuous; he refused first dose of VPA, took second dose and refused third stating it was giving him diarrhea. After a discussion of risk and benefit, the patient agrees to a trial of lithium instead of VPA.  He states he needs a visit from his  prior to this and spontaneously declares he will be going on a hunger strike. 5/18 - patient slept 9 hours overnight. He is notably more calm this morning. Patient got VPA dose last night, but is on lithium only as of this morning. He c/o diarrhea and stomach upset, due to the evening dose of VPA being given. Patient medication compliant, he speaks at length about his ongoing suspicions toward his family, notably his son and his son's friends, regarding substance use; these statements are illogical but coherent. Patient denies SI/HI/AVH; he is discharge focused and planning on appealing his commitment tomorrow. 5/19 - patient slept 5 hours overnight. He remains preoccupied with his family and substance abuse concerns in youth, but is less labile and able to voice his concerns. Patient denies SI/HI/AVH/PI. He is medication compliant with lithium, and denies any GI symptoms. Patient's wife will be visiting this afternoon. He is hoping that she will help with some business concerns that he wants to address. Patient otherwise in fair behavioral control, he is discharge focused; he agrees to MRI this afternoon. SIDE EFFECTS: (reviewed/updated 5/19/2021)  None reported or admitted to. ALLERGIES:(reviewed/updated 5/19/2021)  Allergies   Allergen Reactions    Codeine Nausea Only    Valproic Acid Diarrhea      MEDICATIONS PRIOR TO ADMISSION:(reviewed/updated 5/19/2021)  Medications Prior to Admission   Medication Sig    glipiZIDE SR (GLUCOTROL XL) 5 mg CR tablet Take 5 mg by mouth daily. Indications: type 2 diabetes mellitus    therapeutic multivitamin (THERAGRAN) tablet Take 1 Tab by mouth daily.  predniSONE (DELTASONE) 20 mg tablet Take 20 mg by mouth daily.  escitalopram oxalate (LEXAPRO) 20 mg tablet Take 20 mg by mouth daily. Indications: major depressive disorder    lisinopril-hydroCHLOROthiazide (PRINZIDE, ZESTORETIC) 20-25 mg per tablet Take 1 Tab by mouth daily.  Indications: high blood pressure    traZODone (DESYREL) 50 mg tablet Take 50 mg by mouth nightly as needed for Sleep.  ezetimibe (ZETIA) 10 mg tablet Take 20 mg by mouth daily. Indications: excessive fat in the blood    rosuvastatin (CRESTOR) 20 mg tablet Take 20 mg by mouth nightly. Indications: excessive fat in the blood    mycophenolate (CELLCEPT) 500 mg tablet Take 1 Tab by mouth two (2) times a day.  pyridostigmine (MESTINON) 60 mg tablet 1/2 tab three times daily (Patient taking differently: Take 30 mg by mouth three (3) times daily. 1/2 tab three times daily  Indications: myasthenia gravis, a skeletal muscle disorder)      PAST MEDICAL HISTORY: Past medical history from the initial psychiatric evaluation has been reviewed (reviewed/updated 5/19/2021) with no additional updates (I asked patient and no additional past medical history provided). Past Medical History:   Diagnosis Date    Anxiety disorder     Cancer ([facility] [address]     PROSTATE; BCCA    COVID-19 01/01/2021    Diabetes ([address]     type II    GERD (gastroesophageal reflux disease)     Hypertension     Neurological disorder      Past Surgical History:   Procedure Laterality Date    HX OTHER SURGICAL      Horse Accident-surgery to face for fx    HX OTHER SURGICAL Right     Ear Surgery post facial surgery for scar tissue removal    HX UROLOGICAL        SOCIAL HISTORY: Social history from the initial psychiatric evaluation has been reviewed (reviewed/updated 5/19/2021) with no additional updates (I asked patient and no additional social history provided).    Social History     Socioeconomic History    Marital status:      Spouse name: Not on file    Number of children: Not on file    Years of education: Not on file    Highest education level: Not on file   Occupational History    Not on file   Tobacco Use    Smoking status: Never Smoker    Smokeless tobacco: Never Used   Vaping Use    Vaping Use: Never used   Substance and Sexual Activity    Alcohol use: Yes     Comment: 1 drink 3x a week    Drug use: No    Sexual activity: Yes     Partners: Female   Other Topics Concern     Service Not Asked    Blood Transfusions Not Asked    Caffeine Concern Not Asked    Occupational Exposure Not Asked    Hobby Hazards Not Asked    Sleep Concern Not Asked    Stress Concern Not Asked    Weight Concern Not Asked    Special Diet Not Asked    Back Care Not Asked    Exercise Not Asked    Bike Helmet Not Asked   [address] Not Asked    Self-Exams Not Asked   Social History Narrative    Not on file     Social Determinants of Health     Financial Resource Strain:     Difficulty of Paying Living Expenses:    Food Insecurity:     Worried About Running Out of Food in the Last Year:     [address] in the Last Year:    Transportation Needs:     Lack of Transportation (Medical):  Lack of Transportation (Non-Medical):    Physical Activity:     Days of Exercise per Week:     Minutes of Exercise per Session:    Stress:     Feeling of Stress :    Social Connections:     Frequency of Communication with Friends and Family:     Frequency of Social Gatherings with Friends and Family:     Attends Holiness Services:     Active Member of Clubs or Organizations:     Attends Club or Organization Meetings:     Marital Status:    Intimate Partner Violence:     Fear of Current or Ex-Partner:     Emotionally Abused:     Physically Abused:     Sexually Abused:       FAMILY HISTORY: Family history from the initial psychiatric evaluation has been reviewed (reviewed/updated 5/19/2021) with no additional updates (I asked patient and no additional family history provided).    Family History   Problem Relation Age of Onset    Cancer Brother         LUNG; THROAT    Anesth Problems Neg Hx        REVIEW OF SYSTEMS: (reviewed/updated 5/19/2021)  Appetite:no change from normal   Sleep: poor with DIMS (difficulty initiating & maintaining sleep)   All other Review of Systems: Negative except per HPI         [facility] (MSE):    MSE FINDINGS ARE WITHIN NORMAL LIMITS (WNL) UNLESS OTHERWISE STATED BELOW. ( ALL OF THE BELOW CATEGORIES OF THE MSE HAVE BEEN REVIEWED (reviewed 5/19/2021) AND UPDATED AS DEEMED APPROPRIATE )  General Presentation age appropriate, cooperative   Orientation oriented to time, place and person   Vital Signs  See below (reviewed 5/19/2021); Vital Signs (BP, Pulse, & Temp) are within normal limits if not listed below.    Gait and Station Stable/steady, no ataxia   Musculoskeletal System No extrapyramidal symptoms (EPS); no abnormal muscular movements or Tardive Dyskinesia (TD); muscle strength and tone are within normal limits   Language No aphasia or dysarthria   Speech:  hyperverbal and pressured   Thought Processes illogical; normal rate of thoughts; fair abstract reasoning/computation   Thought Associations tangential   Thought Content paranoid delusions and preoccupations   Suicidal Ideations none   Homicidal Ideations none   Mood:  euphoric and labile    Affect:  full range and increased in intensity   Memory recent  intact   Memory remote:  intact   Concentration/Attention:  intact   Fund of Knowledge average   Insight:  poor   Reliability fair   Judgment:  poor          VITALS:     Patient Vitals for the past 24 hrs:   Temp Pulse Resp BP SpO2   05/19/21 0823 98 °F (36.7 °C) 91 17 112/64 98 %     Wt Readings from Last 3 Encounters:   05/17/21 71.7 kg (158 lb)   05/12/21 72.6 kg (160 lb)   12/07/20 79.4 kg (175 lb)     Temp Readings from Last 3 Encounters:   05/19/21 98 °F (36.7 °C)   05/12/21 98.8 °F (37.1 °C)   07/20/20 98.3 °F (36.8 °C)     BP Readings from Last 3 Encounters:   05/19/21 112/64   05/12/21 (!) 142/100   04/21/21 (!) 154/84     Pulse Readings from Last 3 Encounters:   05/19/21 91   05/12/21 94   04/21/21 76            DATA     LABORATORY DATA:(reviewed/updated 5/19/2021)  No results found for this or any previous visit (from the past 24 hour(s)). Lab Results   Component Value Date/Time    Valproic acid 44 (L) 05/18/2021 05:57 AM     No results found for: [place]   RADIOLOGY REPORTS:(reviewed/updated 5/19/2021)  Ct Head Wo Cont    Result Date: 5/12/2021  INDICATION: Altered mental status Exam: Noncontrast CT of the brain is performed with 5 mm collimation. CT dose reduction was achieved with the use of the standardized protocol tailored for this examination and automatic exposure control for dose modulation. Direct comparison is made to prior MR dated 10/2019. FINDINGS: There is no acute intracranial hemorrhage, mass, mass effect or herniation. Ventricular system is normal. The gray-white matter differentiation is well-preserved. The mastoid air cells are well pneumatized. No acute intracranial hemorrhage, mass or infarct. Xr Chest Port    Result Date: 5/13/2021  EXAM:  XR CHEST PORT INDICATION:  chest pain COMPARISON:  2019 FINDINGS: A portable AP radiograph of the chest was obtained at 1643 hours. . Lungs are clear of an acute process. There is question of a small nodule right midlung. .  The cardiac and mediastinal contours and pulmonary vascularity are normal.  There is degenerative spurring mid lower thoracic spine. 1. Lungs are clear of an acute process. 2. There is question of a nodule in the right midlung versus a nipple shadow. PA and lateral views with nipple markers is recommended for further assessment. Echo Adult Complete    Result Date: 5/14/2021  · LV: Estimated LVEF is 60 - 65%. Visually measured ejection fraction. Normal cavity size, wall thickness and systolic function (ejection fraction normal).  Wall motion: normal.           MEDICATIONS     ALL MEDICATIONS:   Current Facility-Administered Medications   Medication Dose Route Frequency    lithium carbonate CR (ESKALITH CR) tablet 450 mg  450 mg Oral Q12H    amLODIPine (NORVASC) tablet 5 mg  5 mg Oral DAILY    melatonin tablet 6 mg  6 mg Oral QHS    loperamide (IMODIUM) capsule 2 mg  2 mg Oral Q4H PRN    ezetimibe (ZETIA) tablet 10 mg  10 mg Oral DAILY    glipiZIDE (GLUCOTROL) tablet 5 mg  5 mg Oral DAILY    mycophenolate mofetil (CELLCEPT) capsule 500 mg  500 mg Oral BID    pyridostigmine (MESTINON) tablet 30 mg  30 mg Oral TID    rosuvastatin (CRESTOR) tablet 20 mg  20 mg Oral QHS    therapeutic multivitamin (THERAGRAN) tablet 1 Tab  1 Tablet Oral DAILY    aspirin chewable tablet 81 mg  81 mg Oral DAILY    OLANZapine (ZyPREXA) tablet 2.5 mg  2.5 mg Oral Q6H PRN    haloperidol lactate (HALDOL) injection 2.5 mg  2.5 mg IntraMUSCular Q6H PRN    benztropine (COGENTIN) tablet 0.5 mg  0.5 mg Oral BID PRN    diphenhydrAMINE (BENADRYL) injection 25 mg  25 mg IntraMUSCular BID PRN    acetaminophen (TYLENOL) tablet 650 mg  650 mg Oral Q4H PRN    magnesium hydroxide (MILK OF MAGNESIA) 400 mg/5 mL oral suspension 30 mL  30 mL Oral DAILY PRN    cloNIDine HCL (CATAPRES) tablet 0.1 mg  0.1 mg Oral Q4H PRN    traZODone (DESYREL) tablet 50 mg  50 mg Oral QHS PRN      SCHEDULED MEDICATIONS:   Current Facility-Administered Medications   Medication Dose Route Frequency    lithium carbonate CR (ESKALITH CR) tablet 450 mg  450 mg Oral Q12H    amLODIPine (NORVASC) tablet 5 mg  5 mg Oral DAILY    melatonin tablet 6 mg  6 mg Oral QHS    ezetimibe (ZETIA) tablet 10 mg  10 mg Oral DAILY    glipiZIDE (GLUCOTROL) tablet 5 mg  5 mg Oral DAILY    mycophenolate mofetil (CELLCEPT) capsule 500 mg  500 mg Oral BID    pyridostigmine (MESTINON) tablet 30 mg  30 mg Oral TID    rosuvastatin (CRESTOR) tablet 20 mg  20 mg Oral QHS    therapeutic multivitamin (THERAGRAN) tablet 1 Tab  1 Tablet Oral DAILY    aspirin chewable tablet 81 mg  81 mg Oral DAILY          ASSESSMENT & PLAN     DIAGNOSES REQUIRING ACTIVE TREATMENT AND MONITORING: (reviewed/updated 5/19/2021)  Patient Active Hospital Problem List:   Sarah    Assessment: patient floridly manic, preoccupied with his children using drugs amongst other ongoing preoccupations. Per collateral, he has decompensated in the community, inappropriate behaviors have been noted at various public places. CT WNL, labwork unremarkable. Diff Dx includes neuropsychiatric sequelae of COVID-19 / malignancy, or steroid / SSRI induced sraah. Will observe for now, obtain further information regarding psychiatric history, consider mood stabilizer. Plan:  - Observation  - CONTINUE Lithium 450 mg BID for sarah  - Li level 5/21/21  - f/u MRI brain wo contrast  - IGM therapy as tolerated  - Expand database / obtain collateral  - Dispo planning (home)     I will continue to monitor blood levels (lithium---a drug with a narrow therapeutic index= NTI) and associated labs for drug therapy implemented that require intense monitoring for toxicity as deemed appropriate based on current medication side effects and pharmacodynamically determined drug 1/2 lives. In summary, Jong Summers, is a 80 y.o.  male who presents with a severe exacerbation of the principal diagnosis of Sarah ([facility] [address]    Patient's condition is improving. Patient requires continued inpatient hospitalization for further stabilization, safety monitoring and medication management. I will continue to coordinate the provision of individual, milieu, occupational, group, and substance abuse therapies to address target symptoms/diagnoses as deemed appropriate for the individual patient. A coordinated, multidisplinary treatment team round was conducted with the patient (this team consists of the nurse, psychiatric unit pharmacist,  and writer). Complete current electronic health record for patient has been reviewed today including consultant notes, ancillary staff notes, nurses and psychiatric tech notes. Suicide risk assessment completed and patient deemed to be of low risk for suicide at this time.      The following regarding medications was addressed during rounds with patient:   the risks and benefits of the proposed medication. The patient was given the opportunity to ask questions. Informed consent given to the use of the above medications. Will continue to adjust psychiatric and non-psychiatric medications (see above \"medication\" section and orders section for details) as deemed appropriate & based upon diagnoses and response to treatment. I will continue to order blood tests/labs and diagnostic tests as deemed appropriate and review results as they become available (see orders for details and above listed lab/test results). I will order psychiatric records from previous [facility] hospitals to further elucidate the nature of patient's psychopathology and review once available. I will gather additional collateral information from friends, family and o/p treatment team to further elucidate the nature of patient's psychopathology and baselline level of psychiatric functioning. I certify that this patient's inpatient psychiatric hospital services furnished since the previous certification were, and continue to be, required for treatment that could reasonably be expected to improve the patient's condition, or for diagnostic study, and that the patient continues to need, on a daily basis, active treatment furnished directly by or requiring the supervision of inpatient psychiatric facility personnel. In addition, the hospital records show that services furnished were intensive treatment services, admission or related services, or equivalent services.     EXPECTED DISCHARGE DATE/DAY: 5/21/21     DISPOSITION: Home       Signed By:   Araseli Gonzalez MD  5/19/2021

## 2021-05-21 LAB
ANION GAP SERPL CALC-SCNC: 5 MMOL/L (ref 5–15)
BUN SERPL-MCNC: 7 MG/DL (ref 6–20)
BUN/CREAT SERPL: 7 (ref 12–20)
CALCIUM SERPL-MCNC: 9.2 MG/DL (ref 8.5–10.1)
CHLORIDE SERPL-SCNC: 105 MMOL/L (ref 97–108)
CO2 SERPL-SCNC: 29 MMOL/L (ref 21–32)
CREAT SERPL-MCNC: 0.95 MG/DL (ref 0.7–1.3)
DATE LAST DOSE: NORMAL
GLUCOSE SERPL-MCNC: 112 MG/DL (ref 65–100)
LITHIUM SERPL-SCNC: 1.16 MMOL/L (ref 0.6–1.2)
POTASSIUM SERPL-SCNC: 4 MMOL/L (ref 3.5–5.1)
REPORTED DOSE,DOSE: NORMAL UNITS
REPORTED DOSE/TIME,TMG: NORMAL
SODIUM SERPL-SCNC: 139 MMOL/L (ref 136–145)

## 2021-05-21 PROCEDURE — 74011250636 HC RX REV CODE- 250/636: Performed by: PSYCHIATRY & NEUROLOGY

## 2021-05-21 PROCEDURE — 74011250637 HC RX REV CODE- 250/637: Performed by: PSYCHIATRY & NEUROLOGY

## 2021-05-21 PROCEDURE — 74011250637 HC RX REV CODE- 250/637: Performed by: STUDENT IN AN ORGANIZED HEALTH CARE EDUCATION/TRAINING PROGRAM

## 2021-05-21 PROCEDURE — 80178 ASSAY OF LITHIUM: CPT

## 2021-05-21 PROCEDURE — 99232 SBSQ HOSP IP/OBS MODERATE 35: CPT | Performed by: PSYCHIATRY & NEUROLOGY

## 2021-05-21 PROCEDURE — 90785 PSYTX COMPLEX INTERACTIVE: CPT | Performed by: PSYCHIATRY & NEUROLOGY

## 2021-05-21 PROCEDURE — 36415 COLL VENOUS BLD VENIPUNCTURE: CPT

## 2021-05-21 PROCEDURE — 90836 PSYTX W PT W E/M 45 MIN: CPT | Performed by: PSYCHIATRY & NEUROLOGY

## 2021-05-21 PROCEDURE — 80048 BASIC METABOLIC PNL TOTAL CA: CPT

## 2021-05-21 PROCEDURE — 74011250637 HC RX REV CODE- 250/637: Performed by: NURSE PRACTITIONER

## 2021-05-21 PROCEDURE — 65220000003 HC RM SEMIPRIVATE PSYCH

## 2021-05-21 RX ORDER — LANOLIN ALCOHOL/MO/W.PET/CERES
6 CREAM (GRAM) TOPICAL
Qty: 60 TABLET | Refills: 1 | Status: SHIPPED | OUTPATIENT
Start: 2021-05-21

## 2021-05-21 RX ORDER — LITHIUM CARBONATE 450 MG/1
450 TABLET ORAL EVERY 12 HOURS
Qty: 60 TABLET | Refills: 1 | Status: SHIPPED | OUTPATIENT
Start: 2021-05-21 | End: 2021-07-19

## 2021-05-21 RX ORDER — GUAIFENESIN 100 MG/5ML
81 LIQUID (ML) ORAL DAILY
Qty: 30 TABLET | Refills: 1 | Status: SHIPPED | OUTPATIENT
Start: 2021-05-22

## 2021-05-21 RX ORDER — ARIPIPRAZOLE 5 MG/1
5 TABLET ORAL DAILY
Status: DISCONTINUED | OUTPATIENT
Start: 2021-05-21 | End: 2021-05-24 | Stop reason: HOSPADM

## 2021-05-21 RX ORDER — AMLODIPINE BESYLATE 5 MG/1
5 TABLET ORAL DAILY
Qty: 30 TABLET | Refills: 1 | Status: SHIPPED | OUTPATIENT
Start: 2021-05-22

## 2021-05-21 RX ADMIN — PYRIDOSTIGMINE BROMIDE 30 MG: 60 TABLET ORAL at 08:42

## 2021-05-21 RX ADMIN — MAGNESIUM HYDROXIDE 30 ML: 2400 SUSPENSION ORAL at 00:57

## 2021-05-21 RX ADMIN — PYRIDOSTIGMINE BROMIDE 30 MG: 60 TABLET ORAL at 13:55

## 2021-05-21 RX ADMIN — THERA TABS 1 TABLET: TAB at 08:42

## 2021-05-21 RX ADMIN — Medication 6 MG: at 21:13

## 2021-05-21 RX ADMIN — ASPIRIN 81 MG CHEWABLE TABLET 81 MG: 81 TABLET CHEWABLE at 08:42

## 2021-05-21 RX ADMIN — LITHIUM CARBONATE 450 MG: 450 TABLET, EXTENDED RELEASE ORAL at 08:42

## 2021-05-21 RX ADMIN — LITHIUM CARBONATE 450 MG: 450 TABLET, EXTENDED RELEASE ORAL at 21:13

## 2021-05-21 RX ADMIN — MYCOPHENOLATE MOFETIL 500 MG: 250 CAPSULE ORAL at 16:59

## 2021-05-21 RX ADMIN — PYRIDOSTIGMINE BROMIDE 30 MG: 60 TABLET ORAL at 17:00

## 2021-05-21 RX ADMIN — GLIPIZIDE 5 MG: 5 TABLET ORAL at 08:42

## 2021-05-21 RX ADMIN — EZETIMIBE 10 MG: 10 TABLET ORAL at 08:42

## 2021-05-21 RX ADMIN — TRAZODONE HYDROCHLORIDE 50 MG: 50 TABLET ORAL at 21:32

## 2021-05-21 RX ADMIN — ROSUVASTATIN CALCIUM 20 MG: 10 TABLET, FILM COATED ORAL at 21:13

## 2021-05-21 RX ADMIN — MYCOPHENOLATE MOFETIL 500 MG: 250 CAPSULE ORAL at 08:42

## 2021-05-21 RX ADMIN — ARIPIPRAZOLE 5 MG: 5 TABLET ORAL at 14:40

## 2021-05-21 NOTE — GROUP NOTE
BRADLY  GROUP DOCUMENTATION INDIVIDUAL Group Therapy Note Date: 5/21/2021 Group Start Time: 1000 Group End Time: 1100 Group Topic: Topic Group [facility] ACUTE BEHAV [facility] [place], [address] [facility] GROUP DOCUMENTATION GROUP Group Therapy Note Attendees: 8 Attendance: Did not attend Patient's Goal: Interventions/techniques Weyman Mend

## 2021-05-21 NOTE — GROUP NOTE
BRADLY  GROUP DOCUMENTATION INDIVIDUAL Group Therapy Note Date: 5/21/2021 Group Start Time: 1400 Group End Time: 1500 Group Topic: Recreational/Music Therapy [facility] [place] [address] ACUTE BEHAV [facility] [place], [address] GROUP DOCUMENTATION GROUP Group Therapy Note Attendees: 10 Attendance: Attended Patient's Goal:  To concentrate on selected task Interventions/techniques: Supported-crafts,games,music Interactions: Interacted appropriately Mental Status: Calm Behavior/appearance: Needed prompting Goals Achieved: Additional Notes: Pt declined active participation-left session early Nolasco Matter

## 2021-05-21 NOTE — BH NOTES
GROUP THERAPY PROGRESS NOTE    Patient did not participate in Discharge Planning Group.      Roe Ricci LPC LSATP [facility]

## 2021-05-21 NOTE — BH NOTES
0700- Received report from YUDI Warren 46  0501- Pt is alert/oriented x4. Calm and Cooperative. Appropriate and visible in the milieu. Mood is good. Meds/meal compliant. No behavioral issues. Denies suicidal and homicidal ideations. Denies auditory and visual hallucinations. Will continue to monitor pt with q15 min rounds for safety. 1400- Pt upset about not being able to be discharged but he is not aggressive or agitated. 1730- Pt had an uneventful day. Mood is good. Meds/meal compliant. No behavioral issues. No PRN's given this shift.

## 2021-05-21 NOTE — PROGRESS NOTES
Problem: Falls - Risk of  Goal: *Absence of Falls  Description: Document Jeferson Camargo Fall Risk and appropriate interventions in the flowsheet.   Outcome: Progressing Towards Goal  Note: Fall Risk Interventions:            Medication Interventions: Teach patient to arise slowly

## 2021-05-21 NOTE — PROGRESS NOTES
Laboratory Monitoring for Lithium    This patient is currently prescribed the following medication(s):   Current Facility-Administered Medications   Medication Dose Route Frequency    lithium carbonate CR (ESKALITH CR) tablet 450 mg  450 mg Oral Q12H    amLODIPine (NORVASC) tablet 5 mg  5 mg Oral DAILY    melatonin tablet 6 mg  6 mg Oral QHS    ezetimibe (ZETIA) tablet 10 mg  10 mg Oral DAILY    glipiZIDE (GLUCOTROL) tablet 5 mg  5 mg Oral DAILY    mycophenolate mofetil (CELLCEPT) capsule 500 mg  500 mg Oral BID    pyridostigmine (MESTINON) tablet 30 mg  30 mg Oral TID    rosuvastatin (CRESTOR) tablet 20 mg  20 mg Oral QHS    therapeutic multivitamin (THERAGRAN) tablet 1 Tab  1 Tablet Oral DAILY    aspirin chewable tablet 81 mg  81 mg Oral DAILY       The following labs have been completed for monitoring of lithium:    Lithium Serum Concentration  Lab Results   Component Value Date/Time    Lithium level 1.16 05/21/2021 05:54 AM         Renal Function and Chemistry  Lab Results   Component Value Date/Time    Sodium 139 05/21/2021 05:56 AM    Potassium 4.0 05/21/2021 05:56 AM    Chloride 105 05/21/2021 05:56 AM    CO2 29 05/21/2021 05:56 AM    Anion gap 5 05/21/2021 05:56 AM    BUN 7 05/21/2021 05:56 AM    Creatinine 0.95 05/21/2021 05:56 AM    BUN/Creatinine ratio 7 (L) 05/21/2021 05:56 AM       Assessment/Plan:  Lithium level drawn on 5/21 @ 5:54 AM is within therapeutic limits but at high end of range, 1.16 mmol/L (range 0.6-1.2 mmol/L). Level was drawn appropriately, approximately 8.5 hours post-dose and is reflective of steady-state concentration. Serum creatinine is within normal limits but has increased from baseline value (0.69 -->0.95). Consider reducing dose to 600 mg/day.        ANTOINE Pires, [facility]  [phone]

## 2021-05-21 NOTE — PROGRESS NOTES
Broderick Smith presents alert and oriented x4, flat affect, calm mood. He was noted in the milieu watching television with his peers. He denied current SI, HI, AH,VH, depression, anxiety, and pain. He reported having a bowel movement today. Broderick Smith is cooperative and med compliant. He requested and received Trazodone for sleep. He voiced no other concerns at this time. Monitoring for safety and behaviors continues. Maureen Dawkins came to nurses station c/o heartburn/indigestion. He declined Mylanta. He requested and received Ginger ale and saltine crackers. Obtained VS: P-70, R-16, /69, o2 sat 97%. Will continue to monitor.

## 2021-05-21 NOTE — PROGRESS NOTES
Problem: Manic Behavior (Adult/Pediatric)  Goal: *STG: Demonstrates decrease in delusional thinking  Outcome: Progressing Towards Goal

## 2021-05-21 NOTE — BH NOTES
GROUP THERAPY PROGRESS NOTE    Patient did not participate in Process group.      Giselle Alt LPC [facility]

## 2021-05-21 NOTE — BH NOTES
Behavioral Health Treatment Team Note         Patient goal(s) for today: TDO hearing and take medications as prescribed.   Treatment team focus/goals: Monitoring and medication adjustments.   Progress note:  Pt was seen in treatment team this morning. Pt is alert and oriented. Pt denies SI/HI. Pt's mood is less anxious, lesslabile, affect is brighter. Pt's thought process is coherent. Pt's insight and judgment is fair, reliability is fair. Pt participated in family meeting with MD, wife, Scientologist friends and son over phone. Pt's family remains concerned with behaviors and fixations on son's friends drug habits. Pt to begin Abilify. SW updated [facility] and pt to begin outpatient services with [facility] upon discharge.  Social work department will continue to coordinate discharge plans.       LOS:  9                       Expected LOS:TBD    Financial concerns/prescription coverage: [employer] Medicare Part A&B  Date of last family contact: Mis - wife 5/21  [id number]                          Family requesting physician contact today:  Yes -MD spoke with wife 5/21  Discharge plan: Home  Guns in the home: None involved.                                                       Outpatient provider(s): To be linked to [facility]     Participating treatment team AJ Renee and Dr. Zach Colorado MD.

## 2021-05-21 NOTE — DISCHARGE SUMMARY
PSYCHIATRIC DISCHARGE SUMMARY         IDENTIFICATION:    Patient Name  Shawnee Kayser   Date of Birth 1950   [facility] [id number]   Medical Record Number  [id number]      Age  80 y.o. PCP Kelly Tenorio MD   Admit date:  5/12/2021    Discharge date: 5/24/2021   Room Number  324/02  @ [facility]   Date of Service  5/24/2021            TYPE OF DISCHARGE: REGULAR               CONDITION AT DISCHARGE: improved and fair       PROVISIONAL & DISCHARGE DIAGNOSES:    Problem List  Date Reviewed: 12/13/2019        Codes Class    * (Principal) Sarah ([facility] [address] ICD-10-CM: F30.9  ICD-9-CM: 296.00         Malignant neoplasm of prostate ([facility] [address] ICD-10-CM: C61  ICD-9-CM: [address] [facility] Problems    *Sarah ([facility] [address]        DISCHARGE DIAGNOSIS:   Axis I:  SEE ABOVE  Axis II: SEE ABOVE  Axis III: SEE ABOVE  Axis IV:  lack of structure  Axis V:  20 on admission, 55 on discharge     CC & HISTORY OF PRESENT ILLNESS:  \"sarah\"    The patient, Shawnee Kayser, is a 80 y.o. WHITE male with a past psychiatric history significant for major depressive disorder, who presents at this time with complaints of (and/or evidence of) the following emotional symptoms: delusions and sarah. Additional symptomatology include poor self care. The above symptoms have been present for 2+ weeks. These symptoms are of moderate to high severity. These symptoms are constant in nature. The patient's condition has been precipitated by psychosocial stressors. Patient's condition made worse by treatment noncompliance. UDS: negative; BAL=0.      The patient is a tangential historian. The patient corroborates the above narrative. The patient contracts for safety on the unit and gives consent for the team to contact collateral. The patient is amenable to initiating treatment while on the unit.  On interview, the patient is grossly manic, preoccupied with his son potentially using marijuana and the legality of the substance in general. He speaks at length, and is focused on discharge. He has some insight into how he is presenting as he keeps staying 'this all seems crazy I know.' He at times accusing the staff of holding him illegally, threatens to zion MD for his senior care and does not voice understanding of the TDO process. He is otherwise euphoric with ongoing difficulty to redirect. After interview, patient returns to treatment team demanding someone call his local political official to help get him out of the hospital. Patient given access to the unit phone to speak with friends and family. 5/14 - patient remains labile but euphoric. He is grossly manic, and slept 5 hours overnight. Patient noted to have an episode of chest pain, but cardiac workup was negative. Patient continues to perseverate on the need to stop the proliferation of marijuana use amongst young people. Patient ambivalent about starting mood stabilizer, stating he will do so only on the advice of his PCP. MD reached out to patient's PCP Dr. Paige Pfeiffer ([phone]) who was in agreement with the plan to start a mood stabilizer. PCP states that patient's wife called him out of concern, followed by the patient who advised the doctor to discount his wife's concern. 5/15 - \"You're as ugly as homemade sin. \" Quiana Griffith reports feeling fine today. He has been refusing medications and has a very limited insight. He slept 8 1/2 hours. He is disorganized and irritable on interview. 5/16 Marcella Roach reports feeling okay today. He is highly focused on discharge. \"I have less than 24 hours. \" We discussed that he may not in fact be discharged tomorrow but he seemed to dismiss this possibility. He reports improved sleep with melatonin. He denies suicidal thoughts today. No other concerns noted. 5/17 - patient has been grandiose, hyperverbal, still with poor insight into the reason for admission.  Patient slept 9 hours with melatonin and trazodone, given Zyprexa several times over the weekend. Patient down for echocardiogram this morning. Patient improved per nursing, but grossly irritable and discharge focused. Patient remains preoccupied with drug use amongst young people. Medication compliance has been tenuous; he refused first dose of VPA, took second dose and refused third stating it was giving him diarrhea. After a discussion of risk and benefit, the patient agrees to a trial of lithium instead of VPA. He states he needs a visit from his  prior to this and spontaneously declares he will be going on a hunger strike. 5/18 - patient slept 9 hours overnight. He is notably more calm this morning. Patient got VPA dose last night, but is on lithium only as of this morning. He c/o diarrhea and stomach upset, due to the evening dose of VPA being given. Patient medication compliant, he speaks at length about his ongoing suspicions toward his family, notably his son and his son's friends, regarding substance use; these statements are illogical but coherent. Patient denies SI/HI/AVH; he is discharge focused and planning on appealing his commitment tomorrow. 5/19 - patient slept 5 hours overnight. He remains preoccupied with his family and substance abuse concerns in youth, but is less labile and able to voice his concerns. Patient denies SI/HI/AVH/PI. He is medication compliant with lithium, and denies any GI symptoms. Patient's wife will be visiting this afternoon. He is hoping that she will help with some business concerns that he wants to address. Patient otherwise in fair behavioral control, he is discharge focused; he agrees to MRI this afternoon. 5/20 - the patient slept 8 hours overnight. He is more cooperative and appropriate but still is preoccupied with drugs and his family and friends using them. He is medication compliant, was visited by his wife yesterday and was appropriate, though she acknowledges to  that he is not yet at his baseline.  Patient for MRI today, and will have Li level tomorrow with a family session planned prior to discharge. SOCIAL HISTORY:    Social History     Socioeconomic History    Marital status:      Spouse name: Not on file    Number of children: Not on file    Years of education: Not on file    Highest education level: Not on file   Occupational History    Not on file   Tobacco Use    Smoking status: Never Smoker    Smokeless tobacco: Never Used   Vaping Use    Vaping Use: Never used   Substance and Sexual Activity    Alcohol use: Yes     Comment: 1 drink 3x a week    Drug use: No    Sexual activity: Yes     Partners: Female   Other Topics Concern     Service Not Asked    Blood Transfusions Not Asked    Caffeine Concern Not Asked    Occupational Exposure Not Asked    Hobby Hazards Not Asked    Sleep Concern Not Asked    Stress Concern Not Asked    Weight Concern Not Asked    Special Diet Not Asked    Back Care Not Asked    Exercise Not Asked    Bike Helmet Not Asked   [address] Not Asked    Self-Exams Not Asked   Social History Narrative    Not on file     Social Determinants of Health     Financial Resource Strain:     Difficulty of Paying Living Expenses:    Food Insecurity:     Worried About Running Out of Food in the Last Year:     Ran Out of Food in the Last Year:    Transportation Needs:     Lack of Transportation (Medical):      Lack of Transportation (Non-Medical):    Physical Activity:     Days of Exercise per Week:     Minutes of Exercise per Session:    Stress:     Feeling of Stress :    Social Connections:     Frequency of Communication with Friends and Family:     Frequency of Social Gatherings with Friends and Family:     Attends Mosque Services:     Active Member of Clubs or Organizations:     Attends Club or Organization Meetings:     Marital Status:    Intimate Partner Violence:     Fear of Current or Ex-Partner:     Emotionally Abused:     Physically Abused:     Sexually Abused:       FAMILY HISTORY:   Family History   Problem Relation Age of Onset    Cancer Brother         LUNG; THROAT    Anesth Problems Neg Hx              HOSPITALIZATION COURSE:    Darlene Heck was admitted to the inpatient psychiatric unit [facility] for acute psychiatric stabilization in regards to symptomatology as described in the HPI above. 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 While on the unit Darlene Heck was involved in individual, group, occupational and milieu therapy. Psychiatric medications were adjusted during this hospitalization including Depakote (DC'd due to side effects),  lithium, Lexapro and Abilify (discontinued at discharge). Darlene Heck demonstrated a slow, but progressive improvement in overall condition. Much of patient's initial presentation appeared to be related to situational stressors, side effects from medication and psychological factors. Please see individual progress notes for more specific details regarding patient's hospitalization course. Patient's SSRI and corticosteroid were discontinued as they may have contributed to the patient's manic episode. HTN regimen adjusted due to lithium interaction to Norvasc with no appreciable difference in the patient's cardiovascular profile. While the patient had suffered from [place] infection in the late winter, there was no evidence of COVID-related psychiatric decompensation, and a literature search did not demonstrate any  associations of nick with long term symptoms of the illness after a period of normal function. Abilify was started as patient made several delusional comments to his family and treatment team, but was DC'd at discharge due to side effects. At time of discharge, Darlene Heck is without significant problems of depression, psychosis, or nick.  Patient free of suicidal and homicidal ideations (appears to be at very low risk of suicide or homicide) and reports many positive predictive factors in terms of not attempting suicide or homicide. Overall presentation at time of discharge is most consistent with the diagnosis of nick. Patient has maximized benefit to be derived from acute inpatient psychiatric treatment. All members of the treatment team concur with each other in regards to plans for discharge today. Patient and family are aware and in agreement with discharge and discharge plan.          LABS AND IMAGAING:    Labs Reviewed   HEMOGLOBIN A1C WITH EAG - Abnormal; Notable for the following components:       Result Value    Hemoglobin A1c 5.8 (*)     All other components within normal limits   VALPROIC ACID - Abnormal; Notable for the following components:    Valproic acid 44 (*)     All other components within normal limits   METABOLIC PANEL, BASIC - Abnormal; Notable for the following components:    Glucose 112 (*)     BUN/Creatinine ratio 7 (*)     All other components within normal limits   FOLATE - Abnormal; Notable for the following components:    Folate 22.9 (*)     All other components within normal limits   GGT - Abnormal; Notable for the following components:    GGT 11 (*)     All other components within normal limits   GLUCOSE, POC - Abnormal; Notable for the following components:    Glucose (POC) 185 (*)     All other components within normal limits   GLUCOSE, POC - Abnormal; Notable for the following components:    Glucose (POC) 154 (*)     All other components within normal limits   GLUCOSE, POC - Abnormal; Notable for the following components:    Glucose (POC) 126 (*)     All other components within normal limits   TROPONIN I   LIPID PANEL   TROPONIN I   TSH 3RD GENERATION   LIPID PANEL   TROPONIN I   LITHIUM   VITAMIN B12   VITAMIN B1, WHOLE BLOOD   GLUCOSE, POC   GLUCOSE, POC     Lab Results   Component Value Date/Time    Valproic acid 44 (L) 05/18/2021 05:57 AM     Admission on 05/12/2021, Discharged on 05/24/2021   Component Date Value Ref Range Status    Troponin-I, Qt. 05/13/2021 <0.05  <0.05 ng/mL Final    IVSd 05/14/2021 1.37* 0.60 - 1.00 cm Final    LVIDd 05/14/2021 3.58* 4.20 - 5.90 cm Final    LVIDs 05/14/2021 2.01  cm Final    LVOT d 05/14/2021 1.81  cm Final    LVPWd 05/14/2021 0.96  0.60 - 1.00 cm Final    LV Ejection Fraction MOD 4C 05/14/2021 83  percent Final    LV ED Vol A4C 05/14/2021 79.21  mL Final    LV ES Vol A4C 05/14/2021 13.16  mL Final    LVOT Peak Gradient 05/14/2021 6.96  mmHg Final    LVOT Peak Velocity 05/14/2021 131.87  cm/s Final    RVSP 05/14/2021 25.80  mmHg Final    Left Atrium Major Axis 05/14/2021 2.98  cm Final    LA Area 4C 05/14/2021 11.88  cm2 Final    LA Vol 4C 05/14/2021 24.69  18.00 - 58.00 mL Final    Right Atrial Area 4C 05/14/2021 13.49  cm2 Final    Est. RA Pressure 05/14/2021 5.00  mmHg Final    Aortic Valve Area by Planimetry 05/14/2021 2.84  cm2 Final    Mitral Valve Area by Planimetry 05/14/2021 5.29  cm2 Final    MV A Chet 05/14/2021 55.09  cm/s Final    Mitral Valve E Wave Deceleration T* 05/14/2021 130.79  ms Final    MV E Chet 05/14/2021 28.22  cm/s Final    Mitral Valve Pressure Half-time 05/14/2021 37.93  ms Final    MVA (PHT) 05/14/2021 5.80  cm2 Final    MV Peak Gradient 05/14/2021 1.99  mmHg Final    MV Mean Gradient 05/14/2021 0.88  mmHg Final    Mitral Valve Pressure Half-time 05/14/2021 45.24  ms Final    Mitral Valve Max Velocity 05/14/2021 70.51  cm/s Final    Mitral Valve Annulus Velocity Time* 05/14/2021 12.68  cm Final    MVA (PHT) 05/14/2021 4.86  cm2 Final    Pulmonic Valve Systolic Peak Insta* 96/04/0611 3.46  mmHg Final    Pulmonic Regurgitant End Max Veloc* 05/14/2021 92.94  cm/s Final    TR Max Velocity 05/14/2021 197.00  cm/s Final    Triscuspid Valve Regurgitation Pea* 05/14/2021 20.80  mmHg Final    TR Max Velocity 05/14/2021 227.93  cm/s Final    Ao Root D 05/14/2021 2.35  cm Final    MV E/A 05/14/2021 0.51   Final    LV Mass AL 05/14/2021 134.2  88.0 - 224.0 g Final    LV Mass AL Index 05/14/2021 75.1  49.0 - 115.0 g/m2 Final    Left Atrium Minor Axis 05/14/2021 1.67  cm Final    LA Vol Index 05/14/2021 13.82  16.00 - 28.00 ml/m2 Final    LVED Vol Index A4C 05/14/2021 44.3  mL/m2 Final    LVES Vol Index A4C 05/14/2021 7.4  mL/m2 Final    Cholesterol, total 05/13/2021 138  <200 MG/DL Final    Triglyceride 05/13/2021 149  <150 MG/DL Final    HDL Cholesterol 05/13/2021 42  MG/DL Final    LDL, calculated 05/13/2021 66.2  0 - 100 MG/DL Final    VLDL, calculated 05/13/2021 29.8  MG/DL Final    CHOL/HDL Ratio 05/13/2021 3.3  0.0 - 5.0   Final    Troponin-I, Qt. 05/13/2021 <0.05  <0.05 ng/mL Final    TSH 05/14/2021 0.97  0.36 - 3.74 uIU/mL Final    Cholesterol, total 05/14/2021 147  <200 MG/DL Final    Triglyceride 05/14/2021 122  <150 MG/DL Final    HDL Cholesterol 05/14/2021 46  MG/DL Final    LDL, calculated 05/14/2021 76.6  0 - 100 MG/DL Final    VLDL, calculated 05/14/2021 24.4  MG/DL Final    CHOL/HDL Ratio 05/14/2021 3.2  0.0 - 5.0   Final    Hemoglobin A1c 05/14/2021 5.8* 4.0 - 5.6 % Final    Est. average glucose 05/14/2021 120  mg/dL Final    Troponin-I, Qt. 05/14/2021 <0.05  <0.05 ng/mL Final    Ventricular Rate 05/13/2021 82  BPM Final    Atrial Rate 05/13/2021 82  BPM Final    P-R Interval 05/13/2021 154  ms Final    QRS Duration 05/13/2021 92  ms Final    Q-T Interval 05/13/2021 346  ms Final    QTC Calculation (Bezet) 05/13/2021 404  ms Final    Calculated P Axis 05/13/2021 69  degrees Final    Calculated R Axis 05/13/2021 56  degrees Final    Calculated T Axis 05/13/2021 62  degrees Final    Diagnosis 05/13/2021    Final                    Value:** Age and gender specific ECG analysis **  Normal sinus rhythm  Possible Left atrial enlargement  Incomplete right bundle branch block  When compared with ECG of 24-FEB-2015 11:18,  No significant change was found  Confirmed by Blanka Pepe M.D., [address] [place] [address] ([id number]) on 5/14/2021 8:13:48 AM      Glucose (POC) 05/14/2021 185* 65 - 117 mg/dL Final    Performed by 05/14/2021 Tramaine Reveles   Final    Glucose (POC) 05/15/2021 154* 65 - 117 mg/dL Final    Performed by 05/15/2021 Ariel George RN   Final    Glucose (POC) 05/16/2021 126* 65 - 117 mg/dL Final    Performed by 05/16/2021 John Maria   Final    Glucose (POC) 05/17/2021 114  65 - 117 mg/dL Final    Performed by 05/17/2021 Ankit Robert   Final    Baseline HR 05/17/2021 92  BPM Final    Baseline BP 05/17/2021 103  mmHg Final    Stress Base Diastolic BP 17/67/2326 58  mmHg Final    O2 sat rest 05/17/2021 98  % Final    Target HR 05/17/2021 150  bpm Final    Percent HR 05/17/2021 95  % Final    Estimated workload 05/17/2021 7.0  METS Final    Post peak HR 05/17/2021 142  BPM Final    O2 sat peak 05/17/2021 96  % Final    Exercise duration time 05/17/2021 00:05:40   Final    Stress Base Systolic BP 77/19/6198 872  mmHg Final    Stress Base Diastolic BP 52/04/5527 52  mmHg Final    Stress Rate Pressure Product 05/17/2021 23,998  BPM*mmHg Final    Stress Stage 1 Duration 05/17/2021 25:2  min:sec Final    Stress Stage 1 HR 05/17/2021 101  bpm Final    Stress Stage 1 BP 05/17/2021 109/48  mmHg Final    Stress Stage 1 Comments 05/17/2021 pre   Final    Stress Stage 2 Duration 05/17/2021 3:0  min:sec Final    Stress Stage 2 HR 05/17/2021 111  bpm Final    Stress Stage 2 BP 05/17/2021 156/61  mmHg Final    Stress Stage 2 Comments 05/17/2021 stage one   Final    Stress Stage 3 Duration 05/17/2021 2:41  min:sec Final    Stress Stage 3 HR 05/17/2021 134  bpm Final    Stress Stage 3 BP 05/17/2021 169/52  mmHg Final    Stress Stage 3 Comments 05/17/2021 stage two   Final    Stress Stage 4 Duration 05/17/2021 7:29  min:sec Final    Stress Stage 4 HR 05/17/2021 104  bpm Final    Stress Stage 4 BP 05/17/2021 107/55  mmHg Final    Stress Stage 4 Comments 05/17/2021 recovery   Final    STRESS SR HERZOG TREADMILL SCORE 05/17/2021 0   Final    ST Elevation (mm) 05/17/2021 0  mm Final    ST Depression (mm) 05/17/2021 0  mm Final    Angina Index 05/17/2021 0   Final    Valproic acid 05/18/2021 44* 50 - 100 ug/ml Final    Glucose (POC) 05/18/2021 88  65 - 117 mg/dL Final    Performed by 05/18/2021 Vaughn LOCKHART   Final    Lithium level 05/21/2021 1.16  0.60 - 1.20 MMOL/L Final    Reported dose date 05/21/2021 NOT PROVIDED    Final    Reported dose time: 05/21/2021 NOT PROVIDED    Final    Reported dose: 05/21/2021 NOT PROVIDED  UNITS Final    Sodium 05/21/2021 139  136 - 145 mmol/L Final    Potassium 05/21/2021 4.0  3.5 - 5.1 mmol/L Final    Chloride 05/21/2021 105  97 - 108 mmol/L Final    CO2 05/21/2021 29  21 - 32 mmol/L Final    Anion gap 05/21/2021 5  5 - 15 mmol/L Final    Glucose 05/21/2021 112* 65 - 100 mg/dL Final    BUN 05/21/2021 7  6 - 20 MG/DL Final    Creatinine 05/21/2021 0.95  0.70 - 1.30 MG/DL Final    BUN/Creatinine ratio 05/21/2021 7* 12 - 20   Final    GFR est AA 05/21/2021 >60  >60 ml/min/1.73m2 Final    GFR est non-AA 05/21/2021 >60  >60 ml/min/1.73m2 Final    Calcium 05/21/2021 9.2  8.5 - 10.1 MG/DL Final    Vitamin B12 05/22/2021 365  193 - 986 pg/mL Final    Folate 05/22/2021 22.9* 5.0 - 21.0 ng/mL Final    GGT 05/22/2021 11* 15 - 85 U/L Final   Admission on 05/12/2021, Discharged on 05/12/2021   Component Date Value Ref Range Status    WBC 05/12/2021 9.8  4.1 - 11.1 K/uL Final    RBC 05/12/2021 4.97  4.10 - 5.70 M/uL Final    HGB 05/12/2021 15.6  12.1 - 17.0 g/dL Final    HCT 05/12/2021 46.7  36.6 - 50.3 % Final    MCV 05/12/2021 94.0  80.0 - 99.0 FL Final    MCH 05/12/2021 31.4  26.0 - 34.0 PG Final    MCHC 05/12/2021 33.4  30.0 - 36.5 g/dL Final    RDW 05/12/2021 12.5  11.5 - 14.5 % Final    PLATELET 05/22/2424 883  150 - 400 K/uL Final    MPV 05/12/2021 9.6  8.9 - 12.9 FL Final    NRBC 05/12/2021 0.0  0 PER 100 WBC Final    ABSOLUTE NRBC 05/12/2021 0.00  0.00 - 0.01 K/uL Final    NEUTROPHILS 05/12/2021 72  32 - 75 % Final    LYMPHOCYTES 05/12/2021 14  12 - 49 % Final    MONOCYTES 05/12/2021 10  5 - 13 % Final    EOSINOPHILS 05/12/2021 3  0 - 7 % Final    BASOPHILS 05/12/2021 1  0 - 1 % Final    IMMATURE GRANULOCYTES 05/12/2021 0  0.0 - 0.5 % Final    ABS. NEUTROPHILS 05/12/2021 7.1  1.8 - 8.0 K/UL Final    ABS. LYMPHOCYTES 05/12/2021 1.3  0.8 - 3.5 K/UL Final    ABS. MONOCYTES 05/12/2021 1.0  0.0 - 1.0 K/UL Final    ABS. EOSINOPHILS 05/12/2021 0.3  0.0 - 0.4 K/UL Final    ABS. BASOPHILS 05/12/2021 0.1  0.0 - 0.1 K/UL Final    ABS. IMM. GRANS. 05/12/2021 0.0  0.00 - 0.04 K/UL Final    DF 05/12/2021 AUTOMATED    Final    Sodium 05/12/2021 136  136 - 145 mmol/L Final    Potassium 05/12/2021 3.9  3.5 - 5.1 mmol/L Final    Chloride 05/12/2021 105  97 - 108 mmol/L Final    CO2 05/12/2021 25  21 - 32 mmol/L Final    Anion gap 05/12/2021 6  5 - 15 mmol/L Final    Glucose 05/12/2021 105* 65 - 100 mg/dL Final    BUN 05/12/2021 13  6 - 20 MG/DL Final    Creatinine 05/12/2021 0.69* 0.70 - 1.30 MG/DL Final    BUN/Creatinine ratio 05/12/2021 19  12 - 20   Final    GFR est AA 05/12/2021 >60  >60 ml/min/1.73m2 Final    GFR est non-AA 05/12/2021 >60  >60 ml/min/1.73m2 Final    Calcium 05/12/2021 9.1  8.5 - 10.1 MG/DL Final    Bilirubin, total 05/12/2021 0.4  0.2 - 1.0 MG/DL Final    ALT (SGPT) 05/12/2021 20  12 - 78 U/L Final    AST (SGOT) 05/12/2021 12* 15 - 37 U/L Final    Alk.  phosphatase 05/12/2021 50  45 - 117 U/L Final    Protein, total 05/12/2021 6.9  6.4 - 8.2 g/dL Final    Albumin 05/12/2021 3.6  3.5 - 5.0 g/dL Final    Globulin 05/12/2021 3.3  2.0 - 4.0 g/dL Final    A-G Ratio 05/12/2021 1.1  1.1 - 2.2   Final    Salicylate level 09/20/6889 <1.7* 2.8 - 20.0 MG/DL Final    Acetaminophen level 05/12/2021 <2* 10 - 30 ug/mL Final    ALCOHOL(ETHYL),SERUM 05/12/2021 <10  <10 MG/DL Final    Color 05/12/2021 YELLOW/STRAW    Final    Appearance 05/12/2021 CLEAR  CLEAR   Final    Specific gravity 05/12/2021 1.019  1.003 - 1.030   Final    pH (UA) 05/12/2021 5.5  5.0 - 8.0   Final    Protein 05/12/2021 Negative  NEG mg/dL Final    Glucose 05/12/2021 Negative  NEG mg/dL Final    Ketone 05/12/2021 15* NEG mg/dL Final    Bilirubin 05/12/2021 Negative  NEG   Final    Blood 05/12/2021 Negative  NEG   Final    Urobilinogen 05/12/2021 1.0  0.2 - 1.0 EU/dL Final    Nitrites 05/12/2021 Negative  NEG   Final    Leukocyte Esterase 05/12/2021 Negative  NEG   Final    WBC 05/12/2021 0-4  0 - 4 /hpf Final    RBC 05/12/2021 0-5  0 - 5 /hpf Final    Epithelial cells 05/12/2021 FEW  FEW /lpf Final    Bacteria 05/12/2021 Negative  NEG /hpf Final    UA:UC IF INDICATED 05/12/2021 CULTURE NOT INDICATED BY UA RESULT  CNI   Final    Mucus 05/12/2021 1+* NEG /lpf Final    AMPHETAMINES 05/12/2021 Negative  NEG   Final    BARBITURATES 05/12/2021 Negative  NEG   Final    BENZODIAZEPINES 05/12/2021 Negative  NEG   Final    COCAINE 05/12/2021 Negative  NEG   Final    METHADONE 05/12/2021 Negative  NEG   Final    OPIATES 05/12/2021 Negative  NEG   Final    PCP(PHENCYCLIDINE) 05/12/2021 Negative  NEG   Final    THC (TH-CANNABINOL) 05/12/2021 Negative  NEG   Final    Drug screen comment 05/12/2021 (NOTE)   Final    Specimen source 05/12/2021 Nasopharyngeal    Final    COVID-19 rapid test 05/12/2021 Not detected  NOTD   Final    SARS-CoV-2 05/12/2021 Please find results under separate order    Final    SARS-CoV-2 05/12/2021 Not detected  NOTD   Final    Influenza A by PCR 05/12/2021 Not detected  NOTD   Final    Influenza B by PCR 05/12/2021 Not detected  NOTD   Final     MRI BRAIN WO CONT    Result Date: 5/21/2021  EXAM: MRI BRAIN WO CONT INDICATION: psychosis, nick new onset COMPARISON: MRI brain 10/5/2019, CT head 5/12/2021. CONTRAST: None.  TECHNIQUE:  Multiplanar multisequence acquisition without contrast of the brain. FINDINGS: The ventricles are normal in size and position. Unchanged scattered periventricular and deep white matter T2/FLAIR hyperintensities, and patchy T2/FLAIR hyperintensity in the melva, consistent with mild chronic microvascular ischemic disease. There is no acute infarct, hemorrhage, extra-axial fluid collection, or mass effect. There is no cerebellar tonsillar herniation. Expected arterial flow-voids are present. Mild mucosal thickening in the bilateral maxillary sinuses without air-fluid level. The mastoid air cells and middle ears are clear. The orbital contents are within normal limits with bilateral lens implants. No significant osseous or scalp lesions are identified. Facet arthropathy noted in the upper cervical spine. 1. No acute intracranial abnormality. 2. Unchanged mild chronic microvascular ischemic disease. CT HEAD WO CONT    Result Date: 5/12/2021  INDICATION: Altered mental status Exam: Noncontrast CT of the brain is performed with 5 mm collimation. CT dose reduction was achieved with the use of the standardized protocol tailored for this examination and automatic exposure control for dose modulation. Direct comparison is made to prior MR dated 10/2019. FINDINGS: There is no acute intracranial hemorrhage, mass, mass effect or herniation. Ventricular system is normal. The gray-white matter differentiation is well-preserved. The mastoid air cells are well pneumatized. No acute intracranial hemorrhage, mass or infarct. XR CHEST PORT    Result Date: 5/13/2021  EXAM:  XR CHEST PORT INDICATION:  chest pain COMPARISON:  2019 FINDINGS: A portable AP radiograph of the chest was obtained at 1643 hours. . Lungs are clear of an acute process. There is question of a small nodule right midlung. .  The cardiac and mediastinal contours and pulmonary vascularity are normal.  There is degenerative spurring mid lower thoracic spine. 1. Lungs are clear of an acute process. 2. There is question of a nodule in the right midlung versus a nipple shadow. PA and lateral views with nipple markers is recommended for further assessment. ECHO STRESS    Result Date: 5/17/2021  · Baseline ECG: Normal sinus rhythm. · Stress test: Negative stress test. · Echo: Normal stress echocardiogram.      ECHO ADULT COMPLETE    Result Date: 5/14/2021  · LV: Estimated LVEF is 60 - 65%. Visually measured ejection fraction. Normal cavity size, wall thickness and systolic function (ejection fraction normal). Wall motion: normal.                    DISPOSITION:    Home. Patient to f/u with psychiatric and psychotherapy appointments. Patient is to f/u with internist as directed. FOLLOW-UP CARE:    Activity as tolerated  Regular diet  Wound Care: none needed. Follow-up Information     Follow up With Specialties Details Why Contact Info    Agustni Bazzi MD Family Medicine Go on 6/3/2021 Please attend your appointment at 3pm in the office [address] [place] [address] Service Board  Call on 5/24/2021 Please call [facility] at [phone] between 8:00AM -2:00PM to complete intake assessment for ongoing mental health case management, therapy and medication management. Emma Cotter, [address]    Phone: [phone]  Fax: [phone]    Crisis Services: [phone]                   PROGNOSIS:   Amina Emanuel ---- based on nature of patient's pathology/ies and treatment compliance issues. Prognosis is greatly dependent upon patient's ability to remain sober and to follow up with scheduled appointments as well as to comply with psychiatric medications as prescribed.             DISCHARGE MEDICATIONS:     Informed consent given for the use of following psychotropic medications:  Discharge Medication List as of 5/24/2021  4:58 PM      START taking these medications    Details   hydrOXYzine HCL (ATARAX) 25 mg tablet Take 1 Tablet by mouth two (2) times daily as needed for Itching for up to 60 days. Indications: anxious, Normal, Disp-60 Tablet, R-1      amLODIPine (NORVASC) 5 mg tablet Take 1 Tablet by mouth daily. Indications: high blood pressure, Normal, Disp-30 Tablet, R-1      aspirin 81 mg chewable tablet Take 1 Tablet by mouth daily. Indications: treatment to prevent a heart attack, Normal, Disp-30 Tablet, R-1      lithium carbonate CR (ESKALITH CR) 450 mg CR tablet Take 1 Tablet by mouth every twelve (12) hours. Indications: nick, Normal, Disp-60 Tablet, R-1      melatonin 3 mg tablet Take 2 Tablets by mouth nightly. Indications: insomnia, Normal, Disp-60 Tablet, R-1         CONTINUE these medications which have NOT CHANGED    Details   glipiZIDE SR (GLUCOTROL XL) 5 mg CR tablet Take 5 mg by mouth daily. Indications: type 2 diabetes mellitus, Historical Med      therapeutic multivitamin (THERAGRAN) tablet Take 1 Tab by mouth daily. , Historical Med      traZODone (DESYREL) 50 mg tablet Take 50 mg by mouth nightly as needed for Sleep., Historical Med      ezetimibe (ZETIA) 10 mg tablet Take 10 mg by mouth daily. Indications: excessive fat in the blood, Historical Med      rosuvastatin (CRESTOR) 20 mg tablet Take 20 mg by mouth nightly.  Indications: excessive fat in the blood, Historical Med      mycophenolate (CELLCEPT) 500 mg tablet Take 1 Tab by mouth two (2) times a day., Normal, Disp-180 Tab, R-2      pyridostigmine (MESTINON) 60 mg tablet 1/2 tab three times daily, Normal, Disp-135 Tab, R-2         STOP taking these medications       predniSONE (DELTASONE) 20 mg tablet Comments:   Reason for Stopping:         escitalopram oxalate (LEXAPRO) 20 mg tablet Comments:   Reason for Stopping:         lisinopril-hydroCHLOROthiazide (PRINZIDE, ZESTORETIC) 20-25 mg per tablet Comments:   Reason for Stopping:                      A coordinated, multidisplinary treatment team round was conducted with Bethany Cruz is done daily here at [address] [facility]. This team consists of the nurse, psychiatric unit pharmacist,  and William Reyes. I have spent greater than 35 minutes on discharge work.     Signed:  Annabel Swan MD  5/24/2021

## 2021-05-21 NOTE — PROGRESS NOTES
Pharmacist Discharge Medication Reconciliation    Discharging Provider: Dr. Johnna Weems [facility]:   Past Medical History:   Diagnosis Date    Anxiety disorder     Cancer ([facility]     PROSTATE; BCCA    COVID-19 01/01/2021    Diabetes ([facility]     type II    GERD (gastroesophageal reflux disease)     Hypertension     Neurological disorder      Chief Complaint for this Admission: No chief complaint on file. Allergies: Codeine and Valproic acid    Discharge Medications:   Current Discharge Medication List        START taking these medications    Details   amLODIPine (NORVASC) 5 mg tablet Take 1 Tablet by mouth daily. Indications: high blood pressure  Qty: 30 Tablet, Refills: 1  Start date: 5/22/2021      aspirin 81 mg chewable tablet Take 1 Tablet by mouth daily. Indications: treatment to prevent a heart attack  Qty: 30 Tablet, Refills: 1  Start date: 5/22/2021      lithium carbonate CR (ESKALITH CR) 450 mg CR tablet Take 1 Tablet by mouth every twelve (12) hours. Indications: nick  Qty: 60 Tablet, Refills: 1  Start date: 5/21/2021      melatonin 3 mg tablet Take 2 Tablets by mouth nightly. Indications: insomnia  Qty: 60 Tablet, Refills: 1  Start date: 5/21/2021           CONTINUE these medications which have NOT CHANGED    Details   glipiZIDE SR (GLUCOTROL XL) 5 mg CR tablet Take 5 mg by mouth daily. Indications: type 2 diabetes mellitus      therapeutic multivitamin (THERAGRAN) tablet Take 1 Tab by mouth daily. traZODone (DESYREL) 50 mg tablet Take 50 mg by mouth nightly as needed for Sleep.      ezetimibe (ZETIA) 10 mg tablet Take 10 mg by mouth daily. Indications: excessive fat in the blood      rosuvastatin (CRESTOR) 20 mg tablet Take 20 mg by mouth nightly. Indications: excessive fat in the blood      mycophenolate (CELLCEPT) 500 mg tablet Take 1 Tab by mouth two (2) times a day.   Qty: 180 Tab, Refills: 2    Associated Diagnoses: Myasthenia gravis ([facility]      pyridostigmine (MESTINON) 60 mg tablet 1/2 tab three times daily  Qty: 135 Tab, Refills: 2           STOP taking these medications       predniSONE (DELTASONE) 20 mg tablet Comments:   Reason for Stopping:         escitalopram oxalate (LEXAPRO) 20 mg tablet Comments:   Reason for Stopping:         lisinopril-hydroCHLOROthiazide (PRINZIDE, ZESTORETIC) 20-25 mg per tablet Comments:   Reason for Stopping:               The patient's chart, MAR and AVS were reviewed by Corey Dallas, PHARMD, BCPS

## 2021-05-21 NOTE — BH NOTES
GROUP THERAPY PROGRESS NOTE    Patient did not participate in Coping Skills group.      Nadia Face LPC LSATP CSAC

## 2021-05-22 LAB
FOLATE SERPL-MCNC: 22.9 NG/ML (ref 5–21)
GGT SERPL-CCNC: 11 U/L (ref 15–85)
VIT B12 SERPL-MCNC: 365 PG/ML (ref 193–986)

## 2021-05-22 PROCEDURE — 36415 COLL VENOUS BLD VENIPUNCTURE: CPT

## 2021-05-22 PROCEDURE — 82746 ASSAY OF FOLIC ACID SERUM: CPT

## 2021-05-22 PROCEDURE — 74011250637 HC RX REV CODE- 250/637: Performed by: NURSE PRACTITIONER

## 2021-05-22 PROCEDURE — 65220000003 HC RM SEMIPRIVATE PSYCH

## 2021-05-22 PROCEDURE — 74011250636 HC RX REV CODE- 250/636: Performed by: PSYCHIATRY & NEUROLOGY

## 2021-05-22 PROCEDURE — 82977 ASSAY OF GGT: CPT

## 2021-05-22 PROCEDURE — 74011250637 HC RX REV CODE- 250/637: Performed by: STUDENT IN AN ORGANIZED HEALTH CARE EDUCATION/TRAINING PROGRAM

## 2021-05-22 PROCEDURE — 82607 VITAMIN B-12: CPT

## 2021-05-22 PROCEDURE — 84425 ASSAY OF VITAMIN B-1: CPT

## 2021-05-22 PROCEDURE — 74011250637 HC RX REV CODE- 250/637: Performed by: PSYCHIATRY & NEUROLOGY

## 2021-05-22 RX ORDER — ONDANSETRON 4 MG/1
4 TABLET, ORALLY DISINTEGRATING ORAL
Status: DISCONTINUED | OUTPATIENT
Start: 2021-05-22 | End: 2021-05-24 | Stop reason: HOSPADM

## 2021-05-22 RX ORDER — HYDROXYZINE 25 MG/1
25 TABLET, FILM COATED ORAL
Status: DISCONTINUED | OUTPATIENT
Start: 2021-05-22 | End: 2021-05-24 | Stop reason: HOSPADM

## 2021-05-22 RX ADMIN — Medication 6 MG: at 22:01

## 2021-05-22 RX ADMIN — MAGNESIUM HYDROXIDE 30 ML: 2400 SUSPENSION ORAL at 09:12

## 2021-05-22 RX ADMIN — HYDROXYZINE HYDROCHLORIDE 25 MG: 25 TABLET, FILM COATED ORAL at 20:43

## 2021-05-22 RX ADMIN — ASPIRIN 81 MG CHEWABLE TABLET 81 MG: 81 TABLET CHEWABLE at 08:58

## 2021-05-22 RX ADMIN — THERA TABS 1 TABLET: TAB at 08:59

## 2021-05-22 RX ADMIN — ARIPIPRAZOLE 5 MG: 5 TABLET ORAL at 08:59

## 2021-05-22 RX ADMIN — LITHIUM CARBONATE 450 MG: 450 TABLET, EXTENDED RELEASE ORAL at 22:01

## 2021-05-22 RX ADMIN — GLIPIZIDE 5 MG: 5 TABLET ORAL at 09:01

## 2021-05-22 RX ADMIN — PYRIDOSTIGMINE BROMIDE 30 MG: 60 TABLET ORAL at 17:22

## 2021-05-22 RX ADMIN — PYRIDOSTIGMINE BROMIDE 30 MG: 60 TABLET ORAL at 12:29

## 2021-05-22 RX ADMIN — ROSUVASTATIN CALCIUM 20 MG: 10 TABLET, FILM COATED ORAL at 22:00

## 2021-05-22 RX ADMIN — MYCOPHENOLATE MOFETIL 500 MG: 250 CAPSULE ORAL at 09:01

## 2021-05-22 RX ADMIN — EZETIMIBE 10 MG: 10 TABLET ORAL at 08:59

## 2021-05-22 RX ADMIN — PYRIDOSTIGMINE BROMIDE 30 MG: 60 TABLET ORAL at 09:00

## 2021-05-22 RX ADMIN — ONDANSETRON 4 MG: 4 TABLET, ORALLY DISINTEGRATING ORAL at 20:43

## 2021-05-22 RX ADMIN — LITHIUM CARBONATE 450 MG: 450 TABLET, EXTENDED RELEASE ORAL at 08:59

## 2021-05-22 RX ADMIN — MYCOPHENOLATE MOFETIL 500 MG: 250 CAPSULE ORAL at 17:23

## 2021-05-22 NOTE — PROGRESS NOTES
0715: Bedside shift change report given to [facility] RN (oncoming nurse) by Princess Tapia RN (offgoing nurse). Report included the following information SBAR, Kardex, Intake/Output, MAR, Accordion, Recent Results, and Med Rec Status. 7893: Assumed care of patient resting quietly in bed. AO x 4, denies pain/anxiety/depression/SI/HI/AVH. Pt reports feeling constipated. Pleasant and cooperative, pt answers questions appropriately. 8399-2525: Med/meal compliant. Ate breakfast in day room, returned to room. Appropriately interacting with peers/staff. NAD, no behavioral concerns. PRN milk of magnesium given for constipation. 6710-5325: Pt has been resting quietly in bed, did perform ADLs with no assistance. Had BM. Appropriately interacting with staff/peers. NAD, no behavioral concerns, no PRNs. 7241-5241: Pt has been resting quietly in bed, did eat lunch in day room. NAD, no behavioral concerns, no PRNs. 8378-5168: Pt remains isolative in room. NAD, no behavioral concerns, no PRNs.    7874-8173: Med/meal compliant. Has been more present on day room since dinner. NAD, no behavioral concerns, no PRNs. Problem: Falls - Risk of  Goal: *Absence of Falls  Description: Document Donnie Szymanski Fall Risk and appropriate interventions in the flowsheet.   Outcome: Progressing Towards Goal  Note: Fall Risk Interventions:            Medication Interventions: Teach patient to arise slowly                   Problem: Manic Behavior (Adult/Pediatric)  Goal: *STG: Participates in treatment plan  Outcome: Progressing Towards Goal  Goal: *STG: Demonstrates improvement in thought process and speech pattern  Outcome: Progressing Towards Goal  Goal: *STG: Maintains appropriate boundaries  Outcome: Progressing Towards Goal  Goal: *STG: Attends activities and groups  Outcome: Progressing Towards Goal  Goal: *STG: Remains safe in hospital  Outcome: Progressing Towards Goal     Problem: Altered Thought Process (Adult/Pediatric)  Goal: *STG: Participates in treatment plan  Outcome: Progressing Towards Goal  Goal: *STG: Remains safe in hospital  Outcome: Progressing Towards Goal  Goal: *STG: Complies with medication therapy  Outcome: Progressing Towards Goal  Goal: *STG: Attends activities and groups  Outcome: Progressing Towards Goal

## 2021-05-22 NOTE — BH NOTES
PSYCHOTHERAPY SESSION NOTE     Patient Name  Vernell Bee   Date of Birth 1950   [facility] [id number]   Medical Record Number  [id number]      Age  80 y.o. PCP Mariza Stanley MD   Admit date:  5/12/2021    Room Number  324/02  @ [facility]   Date of Service  5/21/2021            Length of psychotherapy session: 45 minutes    Main condition/diagnosis/issues treated during session today, 5/21/2021 : nick    I employed Cognitive Behavioral therapy techniques, Reality-Oriented psychotherapy, as well as supportive psychotherapy in regards to various ongoing psychosocial stressors, including the following: pre-admission and current problems; housing issues; occupational issues; academic issues; and stress of hospitalization. Interpersonal relationship issues and psychodynamic conflicts explored. Attempts made to alleviate maladaptive patterns. Session focused on the patient's mental state. Wife joined session on the unit. Overall, patient is not progressing. Treatment Plan Update (reviewed and updated 5/21/2021) : I will modify psychotherapy tx plan by implementing more stress management strategies, building upon cognitive behavioral techniques, increasing coping skills, as well as shoring up psychological defenses). An extended energy and skill set was needed to engage pt in psychotherapy due to some of the following: resistiveness, complexity, negativity, confrontational nature, hostile behaviors, and/or severe abnormalities in thought processes/psychosis resulting in the loss of expressive/receptive language communication skills.

## 2021-05-22 NOTE — BH NOTES
Assumed care of the patient. Patient was out mostly isolative to his room. He was cooperative with the assessments. He appeared euthymic and was talkative at times. Patient said that he was feeling sad because he did not get to go home today. Patient denied S.I/H. I/A/V/H, depression and anxiety. Patient said that he took a shower today. While asking about his reason for hospitalization, patient said that he does not have any history of mental illness and \"They say,  I was acting strange. \" But I was just trying to help my son. \" Patient was medication and meal compliant. Patient requested PRN medication for sleep . Administered Trazodone at 2132. Will continue to monitor and provide support as needed. Patient slept for about 8.5 hours.

## 2021-05-22 NOTE — PROGRESS NOTES
Problem: Manic Behavior (Adult/Pediatric)  Goal: *STG: Participates in treatment plan  Outcome: Progressing Towards Goal  Goal: *STG: Remains safe in hospital  Outcome: Progressing Towards Goal

## 2021-05-22 NOTE — BH NOTES
Weekend Progress Note:    Chief Complaint: \"its is going\"    Length of Stay: 10 Days    Interval History:  Alert and oriented, appearance is appropriate. Reports that his appetite is good, sleep is good as well. No issue with medications. No incidents reported. Past Medical History:  Past Medical History:   Diagnosis Date    Anxiety disorder     Cancer ([facility]     PROSTATE; BCCA    COVID-19 01/01/2021    Diabetes ([facility]     type II    GERD (gastroesophageal reflux disease)     Hypertension     Neurological disorder            Labs:  Lab Results   Component Value Date/Time    WBC 9.8 05/12/2021 02:44 PM    HGB 15.6 05/12/2021 02:44 PM    HCT 46.7 05/12/2021 02:44 PM    PLATELET 445 79/04/5430 02:44 PM    MCV 94.0 05/12/2021 02:44 PM      Lab Results   Component Value Date/Time    Sodium 139 05/21/2021 05:56 AM    Potassium 4.0 05/21/2021 05:56 AM    Chloride 105 05/21/2021 05:56 AM    CO2 29 05/21/2021 05:56 AM    Anion gap 5 05/21/2021 05:56 AM    Glucose 112 (H) 05/21/2021 05:56 AM    BUN 7 05/21/2021 05:56 AM    Creatinine 0.95 05/21/2021 05:56 AM    BUN/Creatinine ratio 7 (L) 05/21/2021 05:56 AM    GFR est AA >60 05/21/2021 05:56 AM    GFR est non-AA >60 05/21/2021 05:56 AM    Calcium 9.2 05/21/2021 05:56 AM    Bilirubin, total 0.4 05/12/2021 02:44 PM    Alk.  phosphatase 50 05/12/2021 02:44 PM    Protein, total 6.9 05/12/2021 02:44 PM    Albumin 3.6 05/12/2021 02:44 PM    Globulin 3.3 05/12/2021 02:44 PM    A-G Ratio 1.1 05/12/2021 02:44 PM    ALT (SGPT) 20 05/12/2021 02:44 PM      Vitals:    05/21/21 0728 05/21/21 2036 05/21/21 2100 05/22/21 0846   BP: (!) 101/58 (!) 89/56 (!) 119/58 105/63   Pulse: 71 67 70 74   Resp: 16 16  16   Temp: 97.9 °F (36.6 °C) 98.3 °F (36.8 °C)  98.2 °F (36.8 °C)   SpO2: 99% 96% 95% 99%   Weight:       Height:             Current Facility-Administered Medications   Medication Dose Route Frequency Provider Last Rate Last Admin    ARIPiprazole (ABILIFY) tablet 5 mg  5 mg Oral DAILY Alice Will MD   5 mg at 05/22/21 0859    lithium carbonate CR (ESKALITH CR) tablet 450 mg  450 mg Oral Q12H Alice Will MD   450 mg at 05/22/21 0859    amLODIPine (NORVASC) tablet 5 mg  5 mg Oral DAILY Alice Will MD   5 mg at 05/18/21 0848    melatonin tablet 6 mg  6 mg Oral QHS Alice Will MD   6 mg at 05/21/21 2113    loperamide (IMODIUM) capsule 2 mg  2 mg Oral Q4H PRN Brendan Steele MD   2 mg at 05/18/21 0847    ezetimibe (ZETIA) tablet 10 mg  10 mg Oral DAILY Alice Will MD   10 mg at 05/22/21 0859    glipiZIDE (GLUCOTROL) tablet 5 mg  5 mg Oral DAILY Alice Will MD   5 mg at 05/22/21 0901    mycophenolate mofetil (CELLCEPT) capsule 500 mg  500 mg Oral BID Sarah Marrero MD   500 mg at 05/22/21 0901    pyridostigmine (MESTINON) tablet 30 mg  30 mg Oral TID Sarah OSBORNE MD   30 mg at 05/22/21 1229    rosuvastatin (CRESTOR) tablet 20 mg  20 mg Oral QHS Alice Will MD   20 mg at 05/21/21 2113    therapeutic multivitamin (THERAGRAN) tablet 1 Tab  1 Tablet Oral DAILY Alice Will MD   1 Tablet at 05/22/21 0859    aspirin chewable tablet 81 mg  81 mg Oral DAILY Live Rosenberg MD   81 mg at 05/22/21 0858    OLANZapine (ZyPREXA) tablet 2.5 mg  2.5 mg Oral Q6H PRN Luciana Belcher, NP   2.5 mg at 05/15/21 1037    haloperidol lactate (HALDOL) injection 2.5 mg  2.5 mg IntraMUSCular Q6H PRN Luciana Belcher, NP   2.5 mg at 05/13/21 1223    benztropine (COGENTIN) tablet 0.5 mg  0.5 mg Oral BID PRN Luciana Belcher, NP   0.5 mg at 05/13/21 1222    diphenhydrAMINE (BENADRYL) injection 25 mg  25 mg IntraMUSCular BID PRN Luciana Belcher, NP   25 mg at 05/13/21 1223    acetaminophen (TYLENOL) tablet 650 mg  650 mg Oral Q4H PRN Luciana Belcher, NP        magnesium hydroxide (MILK OF MAGNESIA) 400 mg/5 mL oral suspension 30 mL  30 mL Oral DAILY PRN Luciana Belcher, NP   30 mL at 05/22/21 0912    cloNIDine HCL (CATAPRES) tablet 0.1 mg  0.1 mg Oral Q4H PRN Alberto Belcher, NP        traZODone (DESYREL) tablet 50 mg  50 mg Oral QHS PRN Luciana Belcher, NP   50 mg at 05/21/21 2132         Mental Status Exam:  Eye contact: fair  Grooming: fair  Psychomotor activity: relaxed  Speech is spontaneous  Mood is \"good \"  Affect:congruent  Perception:wnl  Suicidal ideation:denies   Cognition is grossly intact         Assessment and Plan:  Martin Fung meets criteria for a diagnosis of nick    Continue the medication regimen as prescribed  Disposition planning to continue. A coordinated, multidisplinary treatment team round was conducted with the patient, nurses, pharmcist,  and writer present. Discussions held with , and/or with family members; Complete current electronic health record for patient was reviewed in full including consultant notes, ancillary staff notes, nurses and tech notes, labs and vitals. I certify that this patients inpatient psychiatric hospital services furnished since the previous certification were, and continue to be, required for treatment that could reasonably be expected to improve the patient's condition, or for diagnostic study, and that the patient continues to need, on a daily basis, active treatment furnished directly by or requiring the supervision of inpatient psychiatric facility personnel. In addition, the hospital records show that services furnished were intensive treatment services, admission or related services, or equivalent services.

## 2021-05-22 NOTE — BH NOTES
PSYCHIATRIC PROGRESS NOTE         Patient Name  Guanako Lujan   Date of Birth 1950   [facility] [id number]   Medical Record Number  [id number]      Age  80 y.o. PCP Ru Finnegan MD   Admit date:  5/12/2021    Room Number  324/02  @ [facility]   Date of Service  5/21/2021         E & M PROGRESS NOTE:         HISTORY       CC:  \"nick\"  HISTORY OF PRESENT ILLNESS/INTERVAL HISTORY:  (reviewed/updated 5/21/2021). per initial evaluation: The patient, Guanako Lujan, is a 80 y.o. WHITE male with a past psychiatric history significant for major depressive disorder, who presents at this time with complaints of (and/or evidence of) the following emotional symptoms: delusions and nick. Additional symptomatology include poor self care. The above symptoms have been present for 2+ weeks. These symptoms are of moderate to high severity. These symptoms are constant in nature. The patient's condition has been precipitated by psychosocial stressors. Patient's condition made worse by treatment noncompliance. UDS: negative; BAL=0.      The patient is a tangential historian. The patient corroborates the above narrative. The patient contracts for safety on the unit and gives consent for the team to contact collateral. The patient is amenable to initiating treatment while on the unit. On interview, the patient is grossly manic, preoccupied with his son potentially using marijuana and the legality of the substance in general. He speaks at length, and is focused on discharge. He has some insight into how he is presenting as he keeps staying 'this all seems crazy I know.' He at times accusing the staff of holding him illegally, threatens to zion MD for his senior living and does not voice understanding of the TDO process. He is otherwise euphoric with ongoing difficulty to redirect.  After interview, patient returns to treatment team demanding someone call his local political official to help get him out of the hospital. Patient given access to the unit phone to speak with friends and family. 5/14 - patient remains labile but euphoric. He is grossly manic, and slept 5 hours overnight. Patient noted to have an episode of chest pain, but cardiac workup was negative. Patient continues to perseverate on the need to stop the proliferation of marijuana use amongst young people. Patient ambivalent about starting mood stabilizer, stating he will do so only on the advice of his PCP. MD reached out to patient's PCP Dr. Nayla Mooney ([phone]) who was in agreement with the plan to start a mood stabilizer. PCP states that patient's wife called him out of concern, followed by the patient who advised the doctor to discount his wife's concern. 5/15 - \"You're as ugly as homemade sin. \" Roe Martinez reports feeling fine today. He has been refusing medications and has a very limited insight. He slept 8 1/2 hours. He is disorganized and irritable on interview. 5/16 Lucrecia Ohara reports feeling okay today. He is highly focused on discharge. \"I have less than 24 hours. \" We discussed that he may not in fact be discharged tomorrow but he seemed to dismiss this possibility. He reports improved sleep with melatonin. He denies suicidal thoughts today. No other concerns noted. 5/17 - patient has been grandiose, hyperverbal, still with poor insight into the reason for admission. Patient slept 9 hours with melatonin and trazodone, given Zyprexa several times over the weekend. Patient down for echocardiogram this morning. Patient improved per nursing, but grossly irritable and discharge focused. Patient remains preoccupied with drug use amongst young people. Medication compliance has been tenuous; he refused first dose of VPA, took second dose and refused third stating it was giving him diarrhea. After a discussion of risk and benefit, the patient agrees to a trial of lithium instead of VPA.  He states he needs a visit from his  prior to this and spontaneously declares he will be going on a hunger strike. 5/18 - patient slept 9 hours overnight. He is notably more calm this morning. Patient got VPA dose last night, but is on lithium only as of this morning. He c/o diarrhea and stomach upset, due to the evening dose of VPA being given. Patient medication compliant, he speaks at length about his ongoing suspicions toward his family, notably his son and his son's friends, regarding substance use; these statements are illogical but coherent. Patient denies SI/HI/AVH; he is discharge focused and planning on appealing his commitment tomorrow. 5/19 - patient slept 5 hours overnight. He remains preoccupied with his family and substance abuse concerns in youth, but is less labile and able to voice his concerns. Patient denies SI/HI/AVH/PI. He is medication compliant with lithium, and denies any GI symptoms. Patient's wife will be visiting this afternoon. He is hoping that she will help with some business concerns that he wants to address. Patient otherwise in fair behavioral control, he is discharge focused; he agrees to MRI this afternoon. 5/20 - the patient slept 8 hours overnight. He is more cooperative and appropriate but still is preoccupied with drugs and his family and friends using them. He is medication compliant, was visited by his wife yesterday and was appropriate, though she acknowledges to SW that he is not yet at his baseline. Patient for MRI today, and will have Li level tomorrow with a family session planned prior to discharge. 5/21 - patient slept 5 hours overnight; he is anxious and irritable per family, still making paranoid comments about drugs and his son's friends. Affect is more appropriate and patient less provocative. Li level 1.16 this morning, Cr 0.95. Patient denies any complaints, he is able to voice his concerns. Discharge canceled as family has grave concerns about patient's mental state.  Family session for this afternoon. SIDE EFFECTS: (reviewed/updated 5/21/2021)  None reported or admitted to. ALLERGIES:(reviewed/updated 5/21/2021)  Allergies   Allergen Reactions    Codeine Nausea Only    Valproic Acid Diarrhea      MEDICATIONS PRIOR TO ADMISSION:(reviewed/updated 5/21/2021)  Medications Prior to Admission   Medication Sig    glipiZIDE SR (GLUCOTROL XL) 5 mg CR tablet Take 5 mg by mouth daily. Indications: type 2 diabetes mellitus    therapeutic multivitamin (THERAGRAN) tablet Take 1 Tab by mouth daily.  predniSONE (DELTASONE) 20 mg tablet Take 20 mg by mouth daily.  escitalopram oxalate (LEXAPRO) 20 mg tablet Take 20 mg by mouth daily. Indications: major depressive disorder    lisinopril-hydroCHLOROthiazide (PRINZIDE, ZESTORETIC) 20-25 mg per tablet Take 1 Tab by mouth daily. Indications: high blood pressure    traZODone (DESYREL) 50 mg tablet Take 50 mg by mouth nightly as needed for Sleep.  ezetimibe (ZETIA) 10 mg tablet Take 10 mg by mouth daily. Indications: excessive fat in the blood    rosuvastatin (CRESTOR) 20 mg tablet Take 20 mg by mouth nightly. Indications: excessive fat in the blood    mycophenolate (CELLCEPT) 500 mg tablet Take 1 Tab by mouth two (2) times a day.  pyridostigmine (MESTINON) 60 mg tablet 1/2 tab three times daily (Patient taking differently: Take 30 mg by mouth three (3) times daily. 1/2 tab three times daily  Indications: myasthenia gravis, a skeletal muscle disorder)      PAST MEDICAL HISTORY: Past medical history from the initial psychiatric evaluation has been reviewed (reviewed/updated 5/21/2021) with no additional updates (I asked patient and no additional past medical history provided).    Past Medical History:   Diagnosis Date    Anxiety disorder     Cancer ([facility] [address]     PROSTATE; BCCA    COVID-19 01/01/2021    Diabetes ([facility] [address]     type II    GERD (gastroesophageal reflux disease)     Hypertension     Neurological disorder      Past Surgical History: Procedure Laterality Date    HX OTHER SURGICAL      Horse Accident-surgery to face for fx    HX OTHER SURGICAL Right     Ear Surgery post facial surgery for scar tissue removal    HX UROLOGICAL        SOCIAL HISTORY: Social history from the initial psychiatric evaluation has been reviewed (reviewed/updated 5/21/2021) with no additional updates (I asked patient and no additional social history provided). Social History     Socioeconomic History    Marital status:      Spouse name: Not on file    Number of children: Not on file    Years of education: Not on file    Highest education level: Not on file   Occupational History    Not on file   Tobacco Use    Smoking status: Never Smoker    Smokeless tobacco: Never Used   Vaping Use    Vaping Use: Never used   Substance and Sexual Activity    Alcohol use: Yes     Comment: 1 drink 3x a week    Drug use: No    Sexual activity: Yes     Partners: Female   Other Topics Concern     Service Not Asked    Blood Transfusions Not Asked    Caffeine Concern Not Asked    Occupational Exposure Not Asked    Hobby Hazards Not Asked    Sleep Concern Not Asked    Stress Concern Not Asked    Weight Concern Not Asked    Special Diet Not Asked    Back Care Not Asked    Exercise Not Asked    Bike Helmet Not Asked   [address] Not Asked    Self-Exams Not Asked   Social History Narrative    Not on file     Social Determinants of Health     Financial Resource Strain:     Difficulty of Paying Living Expenses:    Food Insecurity:     Worried About Running Out of Food in the Last Year:     Ran Out of Food in the Last Year:    Transportation Needs:     Lack of Transportation (Medical):      Lack of Transportation (Non-Medical):    Physical Activity:     Days of Exercise per Week:     Minutes of Exercise per Session:    Stress:     Feeling of Stress :    Social Connections:     Frequency of Communication with Friends and Family:     Frequency of Social Gatherings with Friends and Family:     Attends Congregation Services:     Active Member of Clubs or Organizations:     Attends Club or Organization Meetings:     Marital Status:    Intimate Partner Violence:     Fear of Current or Ex-Partner:     Emotionally Abused:     Physically Abused:     Sexually Abused:       FAMILY HISTORY: Family history from the initial psychiatric evaluation has been reviewed (reviewed/updated 5/21/2021) with no additional updates (I asked patient and no additional family history provided). Family History   Problem Relation Age of Onset    Cancer Brother         LUNG; THROAT    Anesth Problems Neg Hx        REVIEW OF SYSTEMS: (reviewed/updated 5/21/2021)  Appetite:no change from normal   Sleep: poor with DIMS (difficulty initiating & maintaining sleep)   All other Review of Systems: Negative except per HPI         [address] (MSE):    MSE FINDINGS ARE WITHIN NORMAL LIMITS (WNL) UNLESS OTHERWISE STATED BELOW. ( ALL OF THE BELOW CATEGORIES OF THE MSE HAVE BEEN REVIEWED (reviewed 5/21/2021) AND UPDATED AS DEEMED APPROPRIATE )  General Presentation age appropriate, cooperative   Orientation oriented to time, place and person   Vital Signs  See below (reviewed 5/21/2021); Vital Signs (BP, Pulse, & Temp) are within normal limits if not listed below.    Gait and Station Stable/steady, no ataxia   Musculoskeletal System No extrapyramidal symptoms (EPS); no abnormal muscular movements or Tardive Dyskinesia (TD); muscle strength and tone are within normal limits   Language No aphasia or dysarthria   Speech:  non-pressured   Thought Processes coherent; normal rate of thoughts; fair abstract reasoning/computation   Thought Associations tangential   Thought Content preoccupations   Suicidal Ideations none   Homicidal Ideations none   Mood:  euthymic   Affect:  full range and increased in intensity   Memory recent  intact   Memory remote:  intact Concentration/Attention:  intact   Fund of Knowledge average   Insight:  poor   Reliability fair   Judgment:  improving          VITALS:     Patient Vitals for the past 24 hrs:   Temp Pulse Resp BP SpO2   05/21/21 2100  70  (!) 119/58 95 %   05/21/21 2036 98.3 °F (36.8 °C) 67 16 (!) 89/56 96 %   05/21/21 0728 97.9 °F (36.6 °C) 71 16 (!) 101/58 99 %   05/20/21 2315  70 16 118/69 97 %     Wt Readings from Last 3 Encounters:   05/17/21 71.7 kg (158 lb)   05/20/21 71 kg (156 lb 9.6 oz)   05/12/21 72.6 kg (160 lb)     Temp Readings from Last 3 Encounters:   05/21/21 98.3 °F (36.8 °C)   05/12/21 98.8 °F (37.1 °C)   07/20/20 98.3 °F (36.8 °C)     BP Readings from Last 3 Encounters:   05/21/21 (!) 119/58   05/12/21 (!) 142/100   04/21/21 (!) 154/84     Pulse Readings from Last 3 Encounters:   05/21/21 70   05/12/21 94   04/21/21 76            DATA     LABORATORY DATA:(reviewed/updated 5/21/2021)  Recent Results (from the past 24 hour(s))   LITHIUM    Collection Time: 05/21/21  5:54 AM   Result Value Ref Range    Lithium level 1.16 0.60 - 1.20 MMOL/L    Reported dose date NOT PROVIDED      Reported dose time: NOT PROVIDED      Reported dose: NOT PROVIDED UNITS   METABOLIC PANEL, BASIC    Collection Time: 05/21/21  5:56 AM   Result Value Ref Range    Sodium 139 136 - 145 mmol/L    Potassium 4.0 3.5 - 5.1 mmol/L    Chloride 105 97 - 108 mmol/L    CO2 29 21 - 32 mmol/L    Anion gap 5 5 - 15 mmol/L    Glucose 112 (H) 65 - 100 mg/dL    BUN 7 6 - 20 MG/DL    Creatinine 0.95 0.70 - 1.30 MG/DL    BUN/Creatinine ratio 7 (L) 12 - 20      GFR est AA >60 >60 ml/min/1.73m2    GFR est non-AA >60 >60 ml/min/1.73m2    Calcium 9.2 8.5 - 10.1 MG/DL     Lab Results   Component Value Date/Time    Valproic acid 44 (L) 05/18/2021 05:57 AM     Lab Results   Component Value Date/Time    Lithium level 1.16 05/21/2021 05:54 AM      RADIOLOGY REPORTS:(reviewed/updated 5/21/2021)  Ct Head Wo Cont    Result Date: 5/12/2021  INDICATION: Altered mental status Exam: Noncontrast CT of the brain is performed with 5 mm collimation. CT dose reduction was achieved with the use of the standardized protocol tailored for this examination and automatic exposure control for dose modulation. Direct comparison is made to prior MR dated 10/2019. FINDINGS: There is no acute intracranial hemorrhage, mass, mass effect or herniation. Ventricular system is normal. The gray-white matter differentiation is well-preserved. The mastoid air cells are well pneumatized. No acute intracranial hemorrhage, mass or infarct. Xr Chest Port    Result Date: 5/13/2021  EXAM:  XR CHEST PORT INDICATION:  chest pain COMPARISON:  2019 FINDINGS: A portable AP radiograph of the chest was obtained at 1643 hours. . Lungs are clear of an acute process. There is question of a small nodule right midlung. .  The cardiac and mediastinal contours and pulmonary vascularity are normal.  There is degenerative spurring mid lower thoracic spine. 1. Lungs are clear of an acute process. 2. There is question of a nodule in the right midlung versus a nipple shadow. PA and lateral views with nipple markers is recommended for further assessment. Echo Adult Complete    Result Date: 5/14/2021  · LV: Estimated LVEF is 60 - 65%. Visually measured ejection fraction. Normal cavity size, wall thickness and systolic function (ejection fraction normal).  Wall motion: normal.           MEDICATIONS     ALL MEDICATIONS:   Current Facility-Administered Medications   Medication Dose Route Frequency    ARIPiprazole (ABILIFY) tablet 5 mg  5 mg Oral DAILY    lithium carbonate CR (ESKALITH CR) tablet 450 mg  450 mg Oral Q12H    amLODIPine (NORVASC) tablet 5 mg  5 mg Oral DAILY    melatonin tablet 6 mg  6 mg Oral QHS    loperamide (IMODIUM) capsule 2 mg  2 mg Oral Q4H PRN    ezetimibe (ZETIA) tablet 10 mg  10 mg Oral DAILY    glipiZIDE (GLUCOTROL) tablet 5 mg  5 mg Oral DAILY    mycophenolate mofetil (CELLCEPT) capsule 500 mg  500 mg Oral BID    pyridostigmine (MESTINON) tablet 30 mg  30 mg Oral TID    rosuvastatin (CRESTOR) tablet 20 mg  20 mg Oral QHS    therapeutic multivitamin (THERAGRAN) tablet 1 Tab  1 Tablet Oral DAILY    aspirin chewable tablet 81 mg  81 mg Oral DAILY    OLANZapine (ZyPREXA) tablet 2.5 mg  2.5 mg Oral Q6H PRN    haloperidol lactate (HALDOL) injection 2.5 mg  2.5 mg IntraMUSCular Q6H PRN    benztropine (COGENTIN) tablet 0.5 mg  0.5 mg Oral BID PRN    diphenhydrAMINE (BENADRYL) injection 25 mg  25 mg IntraMUSCular BID PRN    acetaminophen (TYLENOL) tablet 650 mg  650 mg Oral Q4H PRN    magnesium hydroxide (MILK OF MAGNESIA) 400 mg/5 mL oral suspension 30 mL  30 mL Oral DAILY PRN    cloNIDine HCL (CATAPRES) tablet 0.1 mg  0.1 mg Oral Q4H PRN    traZODone (DESYREL) tablet 50 mg  50 mg Oral QHS PRN      SCHEDULED MEDICATIONS:   Current Facility-Administered Medications   Medication Dose Route Frequency    ARIPiprazole (ABILIFY) tablet 5 mg  5 mg Oral DAILY    lithium carbonate CR (ESKALITH CR) tablet 450 mg  450 mg Oral Q12H    amLODIPine (NORVASC) tablet 5 mg  5 mg Oral DAILY    melatonin tablet 6 mg  6 mg Oral QHS    ezetimibe (ZETIA) tablet 10 mg  10 mg Oral DAILY    glipiZIDE (GLUCOTROL) tablet 5 mg  5 mg Oral DAILY    mycophenolate mofetil (CELLCEPT) capsule 500 mg  500 mg Oral BID    pyridostigmine (MESTINON) tablet 30 mg  30 mg Oral TID    rosuvastatin (CRESTOR) tablet 20 mg  20 mg Oral QHS    therapeutic multivitamin (THERAGRAN) tablet 1 Tab  1 Tablet Oral DAILY    aspirin chewable tablet 81 mg  81 mg Oral DAILY          ASSESSMENT & PLAN     DIAGNOSES REQUIRING ACTIVE TREATMENT AND MONITORING: (reviewed/updated 5/21/2021)  Patient Active Hospital Problem List:   Sarah    Assessment: patient floridly manic, preoccupied with his children using drugs amongst other ongoing preoccupations.  Per collateral, he has decompensated in the community, inappropriate behaviors have been noted at various public places. CT WNL, labwork unremarkable. Diff Dx includes neuropsychiatric sequelae of COVID-19 / malignancy, or steroid / SSRI induced nick. Will observe for now, obtain further information regarding psychiatric history, consider mood stabilizer. Plan:  - Observation  - CONTINUE Lithium 450 mg BID for nick  - START Abilify 5 mg QDAY for paranoid delusions  - Li level 1.16  - f/u MRI brain wo contrast  - IGM therapy as tolerated  - Expand database / obtain collateral  - Dispo planning (home)     I will continue to monitor blood levels (lithium---a drug with a narrow therapeutic index= NTI) and associated labs for drug therapy implemented that require intense monitoring for toxicity as deemed appropriate based on current medication side effects and pharmacodynamically determined drug 1/2 lives. In summary, Alyssa Varela, is a 80 y.o.  male who presents with a severe exacerbation of the principal diagnosis of Nick ([facility] [address]    Patient's condition is improving. Patient requires continued inpatient hospitalization for further stabilization, safety monitoring and medication management. I will continue to coordinate the provision of individual, milieu, occupational, group, and substance abuse therapies to address target symptoms/diagnoses as deemed appropriate for the individual patient. A coordinated, multidisplinary treatment team round was conducted with the patient (this team consists of the nurse, psychiatric unit pharmacist,  and writer). Complete current electronic health record for patient has been reviewed today including consultant notes, ancillary staff notes, nurses and psychiatric tech notes. Suicide risk assessment completed and patient deemed to be of low risk for suicide at this time. The following regarding medications was addressed during rounds with patient:   the risks and benefits of the proposed medication.  The patient was given the opportunity to ask questions. Informed consent given to the use of the above medications. Will continue to adjust psychiatric and non-psychiatric medications (see above \"medication\" section and orders section for details) as deemed appropriate & based upon diagnoses and response to treatment. I will continue to order blood tests/labs and diagnostic tests as deemed appropriate and review results as they become available (see orders for details and above listed lab/test results). I will order psychiatric records from previous [facility] hospitals to further elucidate the nature of patient's psychopathology and review once available. I will gather additional collateral information from friends, family and o/p treatment team to further elucidate the nature of patient's psychopathology and baselline level of psychiatric functioning. I certify that this patient's inpatient psychiatric hospital services furnished since the previous certification were, and continue to be, required for treatment that could reasonably be expected to improve the patient's condition, or for diagnostic study, and that the patient continues to need, on a daily basis, active treatment furnished directly by or requiring the supervision of inpatient psychiatric facility personnel. In addition, the hospital records show that services furnished were intensive treatment services, admission or related services, or equivalent services.     EXPECTED DISCHARGE DATE/DAY: 5/21/21     DISPOSITION: Home       Signed By:   Calin Avila MD  5/21/2021

## 2021-05-22 NOTE — PROGRESS NOTES
Diet as tolerated. Consider consistent carbohydrate trays as pt is overweight and A1c c/w pre-diabetes. Pt is meal compliant.   Ht: 5'5\"  Wt: 156 lb 9.6 oz  BMI: 26.06 kg/(m^2) c/w overweight  Est energy needs: 1810 kcal, 65 g protein, 1955 mL fluids  Pt will consume > 75% of meals at follow up 7-10 days  LOS

## 2021-05-23 PROCEDURE — 74011250637 HC RX REV CODE- 250/637: Performed by: NURSE PRACTITIONER

## 2021-05-23 PROCEDURE — 65220000003 HC RM SEMIPRIVATE PSYCH

## 2021-05-23 PROCEDURE — 74011250637 HC RX REV CODE- 250/637: Performed by: PSYCHIATRY & NEUROLOGY

## 2021-05-23 PROCEDURE — 74011250637 HC RX REV CODE- 250/637: Performed by: STUDENT IN AN ORGANIZED HEALTH CARE EDUCATION/TRAINING PROGRAM

## 2021-05-23 PROCEDURE — 74011250636 HC RX REV CODE- 250/636: Performed by: PSYCHIATRY & NEUROLOGY

## 2021-05-23 RX ADMIN — PYRIDOSTIGMINE BROMIDE 30 MG: 60 TABLET ORAL at 17:03

## 2021-05-23 RX ADMIN — HYDROXYZINE HYDROCHLORIDE 25 MG: 25 TABLET, FILM COATED ORAL at 20:25

## 2021-05-23 RX ADMIN — THERA TABS 1 TABLET: TAB at 08:07

## 2021-05-23 RX ADMIN — MYCOPHENOLATE MOFETIL 500 MG: 250 CAPSULE ORAL at 17:04

## 2021-05-23 RX ADMIN — ONDANSETRON 4 MG: 4 TABLET, ORALLY DISINTEGRATING ORAL at 20:25

## 2021-05-23 RX ADMIN — ASPIRIN 81 MG CHEWABLE TABLET 81 MG: 81 TABLET CHEWABLE at 08:07

## 2021-05-23 RX ADMIN — PYRIDOSTIGMINE BROMIDE 30 MG: 60 TABLET ORAL at 08:07

## 2021-05-23 RX ADMIN — LITHIUM CARBONATE 450 MG: 450 TABLET, EXTENDED RELEASE ORAL at 21:35

## 2021-05-23 RX ADMIN — ONDANSETRON 4 MG: 4 TABLET, ORALLY DISINTEGRATING ORAL at 14:28

## 2021-05-23 RX ADMIN — EZETIMIBE 10 MG: 10 TABLET ORAL at 08:07

## 2021-05-23 RX ADMIN — ARIPIPRAZOLE 5 MG: 5 TABLET ORAL at 08:07

## 2021-05-23 RX ADMIN — GLIPIZIDE 5 MG: 5 TABLET ORAL at 08:07

## 2021-05-23 RX ADMIN — ROSUVASTATIN CALCIUM 20 MG: 10 TABLET, FILM COATED ORAL at 21:34

## 2021-05-23 RX ADMIN — PYRIDOSTIGMINE BROMIDE 30 MG: 60 TABLET ORAL at 12:08

## 2021-05-23 RX ADMIN — Medication 6 MG: at 21:34

## 2021-05-23 RX ADMIN — LITHIUM CARBONATE 450 MG: 450 TABLET, EXTENDED RELEASE ORAL at 09:51

## 2021-05-23 RX ADMIN — AMLODIPINE BESYLATE 5 MG: 5 TABLET ORAL at 08:07

## 2021-05-23 RX ADMIN — MYCOPHENOLATE MOFETIL 500 MG: 250 CAPSULE ORAL at 08:07

## 2021-05-23 NOTE — PROGRESS NOTES
Problem: Manic Behavior (Adult/Pediatric)  Goal: *STG: Participates in treatment plan  Outcome: Progressing Towards Goal  Goal: *STG: Demonstrates improvement in thought process and speech pattern  Outcome: Progressing Towards Goal  Goal: *STG: Maintains appropriate boundaries  Outcome: Progressing Towards Goal  Goal: *STG: Remains safe in hospital  Outcome: Progressing Towards Goal     Problem: Altered Thought Process (Adult/Pediatric)  Goal: *STG: Absence of lethality  Outcome: Progressing Towards Goal     0700: Shift change report given to Tootie VALENTINE (oncoming nurse) by Daryle Bannister. (offgoing nurse). Report included the following information SBAR, Kardex, Intake/Output and MAR.     [id number]: Assumed care of patient. Patient denies pain, anxiety, depression, SI, HI and AVH. VSS. A&Ox4. Patient told writer that last night he was feeling dizzy and nauseous. Patient states that he is \"feeling better\". Med and meal compliant. Patients wife called to check up on patient. [id number]: Patient in bed sleeping. No signs of distress noted. [id number]: Medication and meal compliant. 0799-2370: Patient nauseous. Orthostatic VS taken and recorded. Given Zofran at 1428. Patient swallowed medication instead of letting it dissolve under tongue. Patient vomited shortly after. Writer cleaned up vomit, didn't see thrown up medication. Patient given ginger ale and crackers. [id number]: Patient states that he feels better. Patient to dayroom to eat dinner. Patient in bed sleeping, no signs of distress noted.

## 2021-05-23 NOTE — BH NOTES
GROUP THERAPY PROGRESS NOTE    Patient did not participate in Recreational Therapy/Coping Skills Group.     AJ Bourne

## 2021-05-23 NOTE — BH NOTES
GROUP THERAPY PROGRESS NOTE    Patient did not participate in Coping Skills group.      Pop Welch MSW Interval History: extubated, normal sinus rhythm. Some stool from colostomy but NG output is moderate and abdomen is distended    Medications:  Continuous Infusions:   sodium chloride 0.9% 75 mL/hr at 12/20/17 1100    Amino acid 5% - dextrose 15% (CLINIMIX-E) solution with additives (1L  provides 510 kcal/L dextrose, with 50 gm AA, 150 gm CHO, Na 35, K 30, Mg 5, Ca 4.5, Acetate 80, Cl 39, Phos 15)       Scheduled Meds:   calcium gluconate IVPB  1,000 mg Intravenous Once    ciprofloxacin  400 mg Intravenous Q12H    enoxparin  40 mg Subcutaneous Q24H    famotidine (PF)  20 mg Intravenous BID    fat emulsion 20%  250 mL Intravenous Every other day    lactated ringers  500 mL Intravenous Once    meropenem (MERREM) IVPB  1 g Intravenous Q8H    metoprolol  5 mg Intravenous Q6H    metronidazole  500 mg Intravenous Q8H    nozaseptin   Each Nare BID     PRN Meds:bisacodyl, dextrose 50%, diphenhydrAMINE, glucagon (human recombinant), insulin aspart, morphine, ondansetron, promethazine, ramelteon     Review of patient's allergies indicates:  No Known Allergies  Objective:     Vital Signs (Most Recent):  Temp: 98.8 °F (37.1 °C) (12/20/17 1100)  Pulse: 78 (12/20/17 1100)  Resp: (!) 22 (12/20/17 1100)  BP: (!) 183/68 (12/20/17 1000)  SpO2: 95 % (12/20/17 1100) Vital Signs (24h Range):  Temp:  [97.5 °F (36.4 °C)-98.8 °F (37.1 °C)] 98.8 °F (37.1 °C)  Pulse:  [77-94] 78  Resp:  [15-24] 22  SpO2:  [93 %-100 %] 95 %  BP: ()/(34-81) 183/68  Arterial Line BP: ()/(41-64) 175/61     Weight: 76.5 kg (168 lb 10.4 oz)  Body mass index is 28.07 kg/m².    Intake/Output - Last 3 Shifts       12/18 0700 - 12/19 0659 12/19 0700 - 12/20 0659 12/20 0700 - 12/21 0659    I.V. (mL/kg) 180.2 (2.4) 3235.3 (42.3)     Other   200    NG/GT  90     IV Piggyback 800 1000 200    Total Intake(mL/kg) 980.2 (13.2) 4325.3 (56.5) 400 (5.2)    Urine (mL/kg/hr) 215 1230 (0.7) 70 (0.2)    Drains 350 450 (0.2) 700 (2)    Stool 0 200 (0.1)      Total Output 565 1880 770    Net +415.2 +2445.3 -370           Urine Occurrence 300 x      Stool Occurrence 1 x 1 x           Physical Exam   Constitutional: He is oriented to person, place, and time. He appears well-developed and well-nourished.   HENT:   Head: Normocephalic and atraumatic.   Eyes: EOM are normal.   Cardiovascular: Normal rate and regular rhythm.    Pulmonary/Chest: Effort normal. No respiratory distress.   Abdominal: He exhibits distension. There is tenderness.   Stoma viable, liquid stool in bag.    Musculoskeletal: Normal range of motion.   Neurological: He is alert and oriented to person, place, and time.   Skin: Skin is warm and dry.   Psychiatric: He has a normal mood and affect. Thought content normal.   Vitals reviewed.      Significant Labs:  CBC:   Recent Labs  Lab 12/20/17  0500   WBC 13.06*   RBC 4.15*   HGB 12.1*   HCT 35.5*      MCV 86   MCH 29.2   MCHC 34.1     CMP:   Recent Labs  Lab 12/18/17  0900  12/19/17  0908  12/20/17  0500   *  < >  --   < > 117*   CALCIUM 9.3  < >  --   < > 8.2*   ALBUMIN 3.1*  --  1.9*  --   --    PROT 6.7  --   --   --   --    *  < >  --   < > 135*   K 4.0  < >  --   < > 4.4   CO2 23  < >  --   < > 23   CL 89*  < >  --   < > 103   BUN 26*  < >  --   < > 20   CREATININE 0.8  < >  --   < > 0.7   ALKPHOS 80  --   --   --   --    ALT 18  --   --   --   --    AST 28  --   --   --   --    BILITOT 1.0  --   --   --   --    < > = values in this interval not displayed.    Significant Diagnostics:  I have reviewed all pertinent imaging results/findings within the past 24 hours.

## 2021-05-23 NOTE — PROGRESS NOTES
Problem: Falls - Risk of  Goal: *Absence of Falls  Description: Document Alma Moore Fall Risk and appropriate interventions in the flowsheet. Outcome: Progressing Towards Goal  Note: Fall Risk Interventions:      Medication Interventions: Teach patient to arise slowly    Problem: Manic Behavior (Adult/Pediatric)  Goal: *STG: Participates in treatment plan  Outcome: Progressing Towards Goal    [id number] Shift report received from Álvaro, [address] [place] [address] Patient met in the hallway, complaining of severe dizziness which he attributed to his medications, he was encourage and helped back to his room, he also complained of nausea, tried to vomit severally without producing any vomitus. His vitals are stable as follows T- 98.0, P- 66, BP- 140/74, R- 18. [place] was [place] [facility] [place]ax 25mg and Zofran 4 mg, same administered with good effect. He was cooperative, alert and oriented x 4. Denied SI/HI/VH/AH, anxiety and depression. He took his due medication. Quick 15 minutes checks is ongoing. No violence recorded.      2130- 0200 Patient sleeping. No behavioral health issues recorded. Q15 minutes checks ongoing. 7961-3198 Patient asleep. No behavioral health issues recorded. 0400- 0600 Patient sleeping quietly his room, no issues with him. Q15 minutes checks carried out. At 6 am, he slept a total of 9.25 hours.

## 2021-05-24 VITALS
TEMPERATURE: 97.3 F | DIASTOLIC BLOOD PRESSURE: 66 MMHG | HEIGHT: 66 IN | HEART RATE: 68 BPM | BODY MASS INDEX: 25.39 KG/M2 | SYSTOLIC BLOOD PRESSURE: 122 MMHG | OXYGEN SATURATION: 96 % | WEIGHT: 158 LBS | RESPIRATION RATE: 18 BRPM

## 2021-05-24 PROCEDURE — 74011250637 HC RX REV CODE- 250/637: Performed by: STUDENT IN AN ORGANIZED HEALTH CARE EDUCATION/TRAINING PROGRAM

## 2021-05-24 PROCEDURE — 99239 HOSP IP/OBS DSCHRG MGMT >30: CPT | Performed by: PSYCHIATRY & NEUROLOGY

## 2021-05-24 PROCEDURE — 74011250636 HC RX REV CODE- 250/636: Performed by: PSYCHIATRY & NEUROLOGY

## 2021-05-24 PROCEDURE — 74011250637 HC RX REV CODE- 250/637: Performed by: PSYCHIATRY & NEUROLOGY

## 2021-05-24 RX ORDER — HYDROXYZINE 25 MG/1
25 TABLET, FILM COATED ORAL
Qty: 60 TABLET | Refills: 1 | Status: SHIPPED | OUTPATIENT
Start: 2021-05-24 | End: 2021-07-23

## 2021-05-24 RX ADMIN — EZETIMIBE 10 MG: 10 TABLET ORAL at 08:16

## 2021-05-24 RX ADMIN — PYRIDOSTIGMINE BROMIDE 30 MG: 60 TABLET ORAL at 08:16

## 2021-05-24 RX ADMIN — MYCOPHENOLATE MOFETIL 500 MG: 250 CAPSULE ORAL at 17:04

## 2021-05-24 RX ADMIN — LITHIUM CARBONATE 450 MG: 450 TABLET, EXTENDED RELEASE ORAL at 08:14

## 2021-05-24 RX ADMIN — GLIPIZIDE 5 MG: 5 TABLET ORAL at 08:14

## 2021-05-24 RX ADMIN — PYRIDOSTIGMINE BROMIDE 30 MG: 60 TABLET ORAL at 17:04

## 2021-05-24 RX ADMIN — THERA TABS 1 TABLET: TAB at 08:14

## 2021-05-24 RX ADMIN — ARIPIPRAZOLE 5 MG: 5 TABLET ORAL at 08:14

## 2021-05-24 RX ADMIN — MYCOPHENOLATE MOFETIL 500 MG: 250 CAPSULE ORAL at 08:16

## 2021-05-24 RX ADMIN — PYRIDOSTIGMINE BROMIDE 30 MG: 60 TABLET ORAL at 12:03

## 2021-05-24 RX ADMIN — ASPIRIN 81 MG CHEWABLE TABLET 81 MG: 81 TABLET CHEWABLE at 08:14

## 2021-05-24 NOTE — BH NOTES
Behavioral Health Treatment Team Note     Patient goal(s) for today: take medications as prescribed, attend groups  Treatment team focus/goals: adjust medications as needed, discharge planning    Progress note: Patient is discharge focused. He shared that he has spoken to his wife and the family is ready for him. He denies SI, HI, and hallucinations. He reports needing to think before he speaks as his impulsivity is part of the reason for his admission. He reports wanting to see his PCP upon discharge and social work with coordinate this. He is agreeable to continue his medication though he is focused on when he can be tapered off and was reminded that he needs to talk to his outpatient team.    Spoke with wife Peggy Jamison. She reports she is able to pick patient up at 6/630 today and that she feels they are ready. She has concerns about patient driving but was encouraged to speak to patient about this and see if they can come up with a plan regarding her concerns. She was informed of the follow up appointments.     LOS:  12  Expected LOS: 12    Insurance info/prescription coverage:  [place] Medicare Part A&B  Date of last family contact: Mis - wife 5/21  [phone]                          Family requesting physician contact today:  Yes -MD spoke with wife 5/21  Discharge plan: Home  Guns in the home: None involved.                                                       Outpatient provider(s): To be [facility]     Participating treatment team members: Paulo Conte and Dr. Ashley Rainey MD.

## 2021-05-24 NOTE — BH NOTES
Weekend Progress Note:    Chief Complaint: \"I feel alright\"    Length of Stay: 11 Days    Interval History:  Jennifer Lyons is observed resting in  Room, he reports intermittent feelings of dizziness but nausea has ceased. He denies SI/HI/AVH, no medication side effects. Jennifer Lyons reports an improvement in mood, he slept about 9 hours last night, no PRN with exception of hydroxyzine and zofran. Past Medical History:  Past Medical History:   Diagnosis Date    Anxiety disorder     Cancer ([facility]     PROSTATE; B[facility]    COVID-19 01/01/2021    Diabetes ([facility]     type II    GERD (gastroesophageal reflux disease)     Hypertension     Neurological disorder            Labs:  Lab Results   Component Value Date/Time    WBC 9.8 05/12/2021 02:44 PM    HGB 15.6 05/12/2021 02:44 PM    HCT 46.7 05/12/2021 02:44 PM    PLATELET 764 08/93/0610 02:44 PM    MCV 94.0 05/12/2021 02:44 PM      Lab Results   Component Value Date/Time    Sodium 139 05/21/2021 05:56 AM    Potassium 4.0 05/21/2021 05:56 AM    Chloride 105 05/21/2021 05:56 AM    CO2 29 05/21/2021 05:56 AM    Anion gap 5 05/21/2021 05:56 AM    Glucose 112 (H) 05/21/2021 05:56 AM    BUN 7 05/21/2021 05:56 AM    Creatinine 0.95 05/21/2021 05:56 AM    BUN/Creatinine ratio 7 (L) 05/21/2021 05:56 AM    GFR est AA >60 05/21/2021 05:56 AM    GFR est non-AA >60 05/21/2021 05:56 AM    Calcium 9.2 05/21/2021 05:56 AM    Bilirubin, total 0.4 05/12/2021 02:44 PM    Alk.  phosphatase 50 05/12/2021 02:44 PM    Protein, total 6.9 05/12/2021 02:44 PM    Albumin 3.6 05/12/2021 02:44 PM    Globulin 3.3 05/12/2021 02:44 PM    A-G Ratio 1.1 05/12/2021 02:44 PM    ALT (SGPT) 20 05/12/2021 02:44 PM      Vitals:    05/22/21 1948 05/23/21 0803 05/23/21 1421 05/23/21 2010   BP: (!) 140/74 113/64 (!) 137/56 110/66   Pulse: 66 70 64 66   Resp: 18 16 16 16   Temp: 98 °F (36.7 °C) 97.5 °F (36.4 °C)  98.1 °F (36.7 °C)   SpO2: 100% 100% 100% 99%   Weight:       Height:             Current Facility-Administered Medications   Medication Dose Route Frequency Provider Last Rate Last Admin    hydrOXYzine HCL (ATARAX) tablet 25 mg  25 mg Oral TID PRN Demetrius Covert' V, FNP   25 mg at 05/23/21 2025    ondansetron (ZOFRAN ODT) tablet 4 mg  4 mg Oral Q6H PRN Demetrius Covert' V, FNP   4 mg at 05/23/21 2025    ARIPiprazole (ABILIFY) tablet 5 mg  5 mg Oral DAILY Alice Will MD   5 mg at 05/23/21 0807    lithium carbonate CR (ESKALITH CR) tablet 450 mg  450 mg Oral Q12H Alice Will MD   450 mg at 05/23/21 2135    amLODIPine (NORVASC) tablet 5 mg  5 mg Oral DAILY Alice Will MD   5 mg at 05/23/21 0807    melatonin tablet 6 mg  6 mg Oral QHS Alice Will MD   6 mg at 05/23/21 2134    loperamide (IMODIUM) capsule 2 mg  2 mg Oral Q4H PRN David Castro MD   2 mg at 05/18/21 0847    ezetimibe (ZETIA) tablet 10 mg  10 mg Oral DAILY Alice Will MD   10 mg at 05/23/21 0807    glipiZIDE (GLUCOTROL) tablet 5 mg  5 mg Oral DAILY Alice Will MD   5 mg at 05/23/21 0807    mycophenolate mofetil (CELLCEPT) capsule 500 mg  500 mg Oral BID Alice Will MD   500 mg at 05/23/21 1704    pyridostigmine (MESTINON) tablet 30 mg  30 mg Oral TID Alice Will MD   30 mg at 05/23/21 1703    rosuvastatin (CRESTOR) tablet 20 mg  20 mg Oral QHS Alice Will MD   20 mg at 05/23/21 2134    therapeutic multivitamin [facility]) tablet 1 Tab  1 Tablet Oral DAILY Alice Will MD   1 Tablet at 05/23/21 4397    aspirin chewable tablet 81 mg  81 mg Oral DAILY Erminio Antoine, MD   81 mg at 05/23/21 0807    OLANZapine (ZyPREXA) tablet 2.5 mg  2.5 mg Oral Q6H PRN Luciana Belcher, NP   2.5 mg at 05/15/21 1037    haloperidol lactate (HALDOL) injection 2.5 mg  2.5 mg IntraMUSCular Q6H PRN Luciana Belcher, NP   2.5 mg at 05/13/21 1223    benztropine (COGENTIN) tablet 0.5 mg  0.5 mg Oral BID PRN Ye, Jessica Tovar NP   0.5 mg at 05/13/21 1222    diphenhydrAMINE (BENADRYL) injection 25 mg  25 mg IntraMUSCular BID PRN Luciana Belcher NP   25 mg at 05/13/21 1223    acetaminophen (TYLENOL) tablet 650 mg  650 mg Oral Q4H PRN Jessica Belcher NP        magnesium hydroxide (MILK OF MAGNESIA) 400 mg/5 mL oral suspension 30 mL  30 mL Oral DAILY PRN Luciana Belcher NP   30 mL at 05/22/21 0912    cloNIDine HCL (CATAPRES) tablet 0.1 mg  0.1 mg Oral Q4H PRN Jessica Belcher NP        traZODone (DESYREL) tablet 50 mg  50 mg Oral QHS PRN Luciana Belcher NP   50 mg at 05/21/21 2132         Mental Status Exam:  Eye contact: fair  Grooming: fair  Psychomotor activity: relaxed  Speech is spontaneous  Mood is \" good\"  Affect:congruent  Perception:wnl  Suicidal ideation: denies  Cognition is grossly intact      Assessment and Plan:  Parrish Kunz meets criteria for a diagnosis of Sarah    Continue the medication regimen as prescribed  Disposition planning to continue. A coordinated, multidisplinary treatment team round was conducted with the patient, nurses, pharmcist,  and writer present. Discussions held with , and/or with family members; Complete current electronic health record for patient was reviewed in full including consultant notes, ancillary staff notes, nurses and tech notes, labs and vitals. I certify that this patients inpatient psychiatric hospital services furnished since the previous certification were, and continue to be, required for treatment that could reasonably be expected to improve the patient's condition, or for diagnostic study, and that the patient continues to need, on a daily basis, active treatment furnished directly by or requiring the supervision of inpatient psychiatric facility personnel.  In addition, the hospital records show that services furnished were intensive treatment services, admission or related services, or equivalent services.

## 2021-05-24 NOTE — SUICIDE SAFETY PLAN
SAFETY PLAN    A suicide Safety Plan is a document that supports someone when they are having thoughts of suicide. Warning Signs that indicate a suicidal crisis may be developing: What (situations, thoughts, feelings, body sensations, behaviors, etc.) do you experience that lets you know you are beginning to think about suicide? 1. Not feeling right  2. nervous  3. Running my moouth    Internal Coping Strategies:  What things can I do (relaxation techniques, hobbies, physical activities, etc.) to take my mind off my problems without contacting another person? 1. Sit down, deep breath and collect my thoughts  2. Work  3. Keep myself occupied    People and social settings that provide distraction: Who can I call or where can I go to distract me? 1. Name: Juan Griffin- Wife  Phone: [phone]  2. Name: Boaz Pickard- friend  Phone: [employer] phone   3. Place: living room            4. Place: crisis center    People whom I can ask for help: Who can I call when I need help - for example, friends, family, clergy, someone else? 1. Name: Juan Soriaer- Wife  Phone: [phone]  2. Name: Boaz Pickard- friend  Phone: [employer] phone   3. Name: Dr Miguel Angel Kahn  Phone: [employer] phone    Professionals or [address] [place] [address] I can contact during a crisis: Who can I call for help - for example, my doctor, my psychiatrist, my psychologist, a mental health provider, a suicide hotline? 1. Clinician Name: Dr Miguel Angel Kahn   Phone: [phone]      Clinician Pager or Emergency Contact #:     2. Clinician Name: Ksenia Ndiaye   Phone: [phone]      Clinician Pager or Emergency Contact #: [phone]    5. Suicide Prevention Lifeline: 9-463-153-TALK (4373)    4.  [address] [place] [address] Emergency Services -  for example, [facility] suicide hotline, [facility] Hotline: [facility]      Emergency Services Address: [place], [place], [address]       Emergency Services Phone: [phone]    Making the environment safe: How can I make my environment (house/apartment/living space) safer? For example, can I remove guns, medications, and other items? 1. Remove all knives  2.  Guns removed

## 2021-05-24 NOTE — BH NOTES
Patient alert and verbal. Discharged home to continue recommended plan of care. Discharge instructions reviewed with patient. Patient verbalized understanding. Patient belongings, home medications and valuables returned. Patients wife transported patient home.

## 2021-05-24 NOTE — DISCHARGE INSTRUCTIONS
Patient Education   If I feel I am at risk of hurting myself or others, I will call the crisis office and speak with a crisis worker who will assist me during my crisis. [address] [facility] [address]  [phone]  [facility] [place] [address] [phone]  [address] Crisis-  [phone]  [facility]-  [phone]  [place] [facility]-  [phone]       Bipolar Disorder: Care Instructions  Your Care Instructions     Bipolar disorder is an illness that causes extreme mood changes, from times of very high energy (manic episodes) to times of depression. But many people with bipolar disorder show only the symptoms of depression. These moods may cause problems with your work, school, family life, friendships, and how well you function. This disease is also called manic-depression. There is no cure for bipolar disorder, but it can be helped with medicines. Counseling may also help. It is important to take your medicines exactly as prescribed, even when you feel well. You may need lifelong treatment. Follow-up care is a key part of your treatment and safety. Be sure to make and go to all appointments, and call your doctor if you are having problems. It's also a good idea to know your test results and keep a list of the medicines you take. How can you care for yourself at home? · Be safe with medicines. Take your medicines exactly as prescribed. Do not stop or change a medicine without talking to your doctor first. Tarun Pinto and your doctor may need to try different combinations of medicines to find what works for you. · Take your medicines on schedule to keep your moods even. When you feel good, you may think that you do not need your medicines. But it is important to keep taking them. · Go to your counseling sessions. Call and talk with your counselor if you can't go to a session or if you don't think the sessions are helping. Do not just stop going.   · Get at least 30 minutes of activity on most days of the week. Walking is a good choice. You also may want to do other things, such as running, swimming, or cycling. · Get enough sleep. Keep your room dark and quiet. Try to go to bed at the same time every night. · Eat a healthy diet. This includes whole grains, dairy, fruits, vegetables, and protein. Eat foods from each of these groups. · Try to lower your stress. Manage your time, build a strong support system, and lead a healthy lifestyle. To lower your stress, try physical activity, slow deep breathing, or getting a massage. · Do not use alcohol, marijuana, or illegal drugs. · Learn the early signs of your mood changes. You can then take steps to help yourself feel better. · Ask for help from friends and family when you need it. You may need help with daily chores when you are depressed. When you are manic, you may need support to control your high energy levels. What should you do if someone in your family has bipolar disorder? · Learn about the disease and signs it's getting worse. · Remind your family member you love them. · Make a plan with all family members about how to take care of your loved one when symptoms are bad. · Remind yourself it will take time for changes to occur. · Try not to blame yourself for the disease. · Know your legal rights and the legal rights of your family member. Support groups or counselors can help with this information. · Take care of yourself. Keep up with your interests, such as career, hobbies, and friends. Use exercise, positive self-talk, deep breathing, and other relaxing exercises to help lower your stress. · Give yourself time to grieve. You may need to deal with emotions such as anger, fear, and frustration. · If you are having a hard time with your feelings or with your relationship with your family member, talk with a counselor. When should you call for help? Call 911 anytime you think you may need emergency care.  For example, call if:    · You feel like hurting yourself or someone else.     · Someone who has bipolar disorder displays dangerous behavior, and you think the person might hurt himself or herself or someone else. Call your doctor now or seek immediate medical care if:    · You hear voices.     · Someone you know has bipolar disorder and talks about suicide. Keep the numbers for these national suicide hotlines: 1-044-031-TALK (5-198.895.2929) and 8-755-TGEUJXE (9-920.467.7633). If a suicide threat seems real, with a specific plan and a way to carry it out, stay with the person, or ask someone you trust to stay with the person, until you can get help.     · Someone you know has bipolar disorder and:  ? Starts to give away possessions. ? Is using illegal drugs or drinking alcohol heavily. ? Talks or writes about death, including writing suicide notes or talking about guns, knives, or pills. ? Talks or writes about hurting someone else. ? Starts to spend a lot of time alone. ? Acts very aggressively or suddenly appears calm. ? Talks about beliefs that are not based in reality (delusions). Watch closely for changes in your health, and be sure to contact your doctor if:    · You cannot go to your counseling sessions. Where can you learn more? Go to http://www.friedman.com/  Enter K052 in the search box to learn more about \"Bipolar Disorder: Care Instructions. \"  Current as of: September 23, 2020               Content Version: 12.8  © 2006-2021 [employer]. Care instructions adapted under license by [employer] (which disclaims liability or warranty for this information). If you have questions about a medical condition or this instruction, always ask your healthcare professional. [address] any warranty or liability for your use of this information.

## 2021-05-24 NOTE — GROUP NOTE
BRADLY  GROUP DOCUMENTATION INDIVIDUAL Group Therapy Note Date: 5/24/2021 Group Start Time: 1400 Group End Time: 1500 Group Topic: Recreational/Music Therapy [address] [facility] 3 ACUTE BEHAV [facility] [place], [address] [facility] GROUP DOCUMENTATION GROUP Group Therapy Note Attendees: 8 Attendance: Did not attend Patient's Goal: Interventions/techniques Senia Corona

## 2021-05-24 NOTE — BH NOTES
GROUP THERAPY PROGRESS NOTE    Patient did not participate in Coping Skills group.      Zoë Foley, MSW

## 2021-05-24 NOTE — GROUP NOTE
BRADLY  GROUP DOCUMENTATION INDIVIDUAL Group Therapy Note Date: 5/24/2021 Group Start Time: 1000 Group End Time: 1100 Group Topic: Topic Group [facility] 3 ACUTE BEHAV [facility] [place], [address] [facility] GROUP DOCUMENTATION GROUP Group Therapy Note Attendees: 9 Attendance: Did not attend Patient's Goal: Interventions/techniquesMinnie Call

## 2021-05-24 NOTE — BH NOTES
Behavioral Health Transition Record to Provider    Patient Name: Tanvir Nevarez  YOB: 1950  Medical Record Number: [id number]  Date of Admission: 5/12/2021  Date of Discharge: 5/24/2021    Attending Provider: Toney Blanco, *  Discharging Provider: Toney Blanco, *  To contact this individual call [phone] and ask the  to page. If unavailable, ask to be transferred to [address] [place] [address] Provider on call. [facility] Provider will be available on call 24/7 and during holidays. Primary Care Provider: Anna Marie Gonzalez MD    Allergies   Allergen Reactions    Codeine Nausea Only    Valproic Acid Diarrhea       Reason for Admission: The Dory Gao, is [phone] y.o.  WHITE male with a past psychiatric history significant for major depressive disorder, who presents at this time with complaints of (and/or evidence of) the following emotional symptoms: delusions and nick.  Additional symptomatology include poor self care.  The above symptoms have been present for 2+ weeks. These symptoms are of moderate to high severity. These symptoms are constant in nature.  The patient's condition has been precipitated by psychosocial stressors.  Patient's condition made worse by treatment noncompliance. UDS: negative; BAL=0.      The patient is a tangential historian. The patient corroborates the above narrative. The patient contracts for safety on the unit and gives consent for the team to contact collateral. The patient is amenable to initiating treatment while on the unit. On interview, the patient is grossly manic, preoccupied with his son potentially using marijuana and the legality of the substance in general. He speaks at length, and is focused on discharge.  He has some insight into how he is presenting as he keeps staying 'this all seems crazy I know.' He at times accusing the staff of holding him illegally, threatens to zion MD for his MCFP and does not voice understanding of the TDO process. He is otherwise euphoric with ongoing difficulty to redirect. After interview, patient returns to treatment team demanding someone call his local political official to help get him out of the hospital. Patient given access to the unit phone to speak with friends and family.     Admission Diagnosis: Psychosis ([address] [F29]    * No surgery found *    Results for orders placed or performed during the hospital encounter of 05/12/21   TROPONIN I   Result Value Ref Range    Troponin-I, Qt. <0.05 <0.05 ng/mL   LIPID PANEL   Result Value Ref Range    Cholesterol, total 138 <200 MG/DL    Triglyceride 149 <150 MG/DL    HDL Cholesterol 42 MG/DL    LDL, calculated 66.2 0 - 100 MG/DL    VLDL, calculated 29.8 MG/DL    CHOL/HDL Ratio 3.3 0.0 - 5.0     TROPONIN I   Result Value Ref Range    Troponin-I, Qt. <0.05 <0.05 ng/mL   TSH 3RD GENERATION   Result Value Ref Range    TSH 0.97 0.36 - 3.74 uIU/mL   LIPID PANEL   Result Value Ref Range    Cholesterol, total 147 <200 MG/DL    Triglyceride 122 <150 MG/DL    HDL Cholesterol 46 MG/DL    LDL, calculated 76.6 0 - 100 MG/DL    VLDL, calculated 24.4 MG/DL    CHOL/HDL Ratio 3.2 0.0 - 5.0     HEMOGLOBIN A1C WITH EAG   Result Value Ref Range    Hemoglobin A1c 5.8 (H) 4.0 - 5.6 %    Est. average glucose 120 mg/dL   TROPONIN I   Result Value Ref Range    Troponin-I, Qt. <0.05 <0.05 ng/mL   VALPROIC ACID   Result Value Ref Range    Valproic acid 44 (L) 50 - 100 ug/ml   LITHIUM   Result Value Ref Range    Lithium level 1.16 0.60 - 1.20 MMOL/L    Reported dose date NOT PROVIDED      Reported dose time: NOT PROVIDED      Reported dose: NOT PROVIDED UNITS   METABOLIC PANEL, BASIC   Result Value Ref Range    Sodium 139 136 - 145 mmol/L    Potassium 4.0 3.5 - 5.1 mmol/L    Chloride 105 97 - 108 mmol/L    CO2 29 21 - 32 mmol/L    Anion gap 5 5 - 15 mmol/L    Glucose 112 (H) 65 - 100 mg/dL    BUN 7 6 - 20 MG/DL    Creatinine 0.95 0.70 - 1.30 MG/DL    BUN/Creatinine ratio 7 (L) 12 - 20      GFR est AA >60 >60 ml/min/1.73m2    GFR est non-AA >60 >60 ml/min/1.73m2    Calcium 9.2 8.5 - 10.1 MG/DL   VITAMIN B12   Result Value Ref Range    Vitamin B12 365 193 - 986 pg/mL   FOLATE   Result Value Ref Range    Folate 22.9 (H) 5.0 - 21.0 ng/mL   GGT   Result Value Ref Range    GGT 11 (L) 15 - 85 U/L   GLUCOSE, POC   Result Value Ref Range    Glucose (POC) 185 (H) 65 - 117 mg/dL    Performed by Felisa Hernandez    GLUCOSE, POC   Result Value Ref Range    Glucose (POC) 154 (H) 65 - 117 mg/dL    Performed by Cecil Chen \"Kenneth\" SANJU    GLUCOSE, POC   Result Value Ref Range    Glucose (POC) 126 (H) 65 - 117 mg/dL    Performed by Adina Regan    GLUCOSE, POC   Result Value Ref Range    Glucose (POC) 114 65 - 117 mg/dL    Performed by Ayse Albarado    GLUCOSE, POC   Result Value Ref Range    Glucose (POC) 88 65 - 117 mg/dL    Performed by Cecelia LOCKHART    EKG, 12 LEAD, SUBSEQUENT   Result Value Ref Range    Ventricular Rate 82 BPM    Atrial Rate 82 BPM    P-R Interval 154 ms    QRS Duration 92 ms    Q-T Interval 346 ms    QTC Calculation (Bezet) 404 ms    Calculated P Axis 69 degrees    Calculated R Axis 56 degrees    Calculated T Axis 62 degrees    Diagnosis       ** Age and gender specific ECG analysis **  Normal sinus rhythm  Possible Left atrial enlargement  Incomplete right bundle branch block  When compared with ECG of 24-FEB-2015 11:18,  No significant change was found  Confirmed by Bethany Rodrigues M.D., Jose Viramontes ([id number]) on 5/14/2021 8:13:48 AM     ECHO ADULT COMPLETE   Result Value Ref Range    IVSd 1.37 (A) 0.60 - 1.00 cm    LVIDd 3.58 (A) 4.20 - 5.90 cm    LVIDs 2.01 cm    LVOT d 1.81 cm    LVPWd 0.96 0.60 - 1.00 cm    LV Ejection Fraction MOD 4C 83 percent    LV ED Vol A4C 79.21 mL    LV ES Vol A4C 13.16 mL    LVOT Peak Gradient 6.96 mmHg    LVOT Peak Velocity 131.87 cm/s    RVSP 25.80 mmHg    Left Atrium Major Axis 2.98 cm    LA Area 4C 11.88 cm2    LA Vol 4C 24.69 18.00 - 58.00 mL    Right Atrial Area 4C 13.49 cm2    Est. RA Pressure 5.00 mmHg    Aortic Valve Area by Planimetry 2.84 cm2    Mitral Valve Area by Planimetry 5.29 cm2    MV A Chet 55.09 cm/s    Mitral Valve E Wave Deceleration Time 130.79 ms    MV E Chet 28.22 cm/s    Mitral Valve Pressure Half-time 37.93 ms    MVA (PHT) 5.80 cm2    MV Peak Gradient 1.99 mmHg    MV Mean Gradient 0.88 mmHg    Mitral Valve Pressure Half-time 45.24 ms    Mitral Valve Max Velocity 70.51 cm/s    Mitral Valve Annulus Velocity Time Integral 12.68 cm    MVA (PHT) 4.86 cm2    Pulmonic Valve Systolic Peak Instantaneous Gradient 3.46 mmHg    Pulmonic Regurgitant End Max Velocity 92.94 cm/s    TR Max Velocity 197.00 cm/s    Triscuspid Valve Regurgitation Peak Gradient 20.80 mmHg    TR Max Velocity 227.93 cm/s    Ao Root D 2.35 cm    MV E/A 0.51     LV Mass .2 88.0 - 224.0 g    LV Mass AL Index 75.1 49.0 - 115.0 g/m2    Left Atrium Minor Axis 1.67 cm    LA Vol Index 13.82 16.00 - 28.00 ml/m2    LVED Vol Index A4C 44.3 mL/m2    LVES Vol Index A4C 7.4 mL/m2   ECHO STRESS   Result Value Ref Range    Baseline HR 92 BPM    Baseline  mmHg    Stress Base Diastolic BP 58 mmHg    O2 sat rest 98 %    Target  bpm    Percent HR 95 %    Estimated workload 7.0 METS    Post peak  BPM    O2 sat peak 96 %    Exercise duration time 00:05:40     Stress Base Systolic  mmHg    Stress Base Diastolic BP 52 mmHg    Stress Rate Pressure Product 23,998 BPM*mmHg    Stress Stage 1 Duration 25:2 min:sec    Stress Stage 1  bpm    Stress Stage 1 /48 mmHg    Stress Stage 1 Comments pre     Stress Stage 2 Duration 3:0 min:sec    Stress Stage 2  bpm    Stress Stage 2 /61 mmHg    Stress Stage 2 Comments stage one     Stress Stage 3 Duration 2:41 min:sec    Stress Stage 3  bpm    Stress Stage 3 /52 mmHg    Stress Stage 3 Comments stage two     Stress Stage 4 Duration 7:29 min:sec    Stress Stage 4  bpm    Stress Stage 4 /55 mmHg    Stress Stage 4 Comments recovery     STRESS SR HERZOG TREADMILL SCORE 0     ST Elevation (mm) 0 mm    ST Depression (mm) 0 mm    Angina Index 0        Immunizations administered during this encounter: There is no immunization history on file for this patient. Screening for Metabolic Disorders for Patients on Antipsychotic Medications  (Data obtained from the EMR)    Estimated Body Mass Index  Estimated body mass index is 25.5 kg/m² as calculated from the following:    Height as of this encounter: 5' 6\" (1.676 m). Weight as of this encounter: 71.7 kg (158 lb). Vital Signs/Blood Pressure  Visit Vitals  /66   Pulse 68   Temp 97.3 °F (36.3 °C)   Resp 18   Ht 5' 6\" (1.676 m)   Wt 71.7 kg (158 lb)   SpO2 96%   BMI 25.50 kg/m²       Blood Glucose/Hemoglobin A1c  Lab Results   Component Value Date/Time    Glucose 112 (H) 05/21/2021 05:56 AM    Glucose (POC) 88 05/18/2021 04:12 PM       Lab Results   Component Value Date/Time    Hemoglobin A1c 5.8 (H) 05/14/2021 03:38 AM        Lipid Panel  Lab Results   Component Value Date/Time    Cholesterol, total 147 05/14/2021 03:38 AM    HDL Cholesterol 46 05/14/2021 03:38 AM    LDL, calculated 76.6 05/14/2021 03:38 AM    Triglyceride 122 05/14/2021 03:38 AM    CHOL/HDL Ratio 3.2 05/14/2021 03:38 AM        Discharge Diagnosis: Please see physician discharge summary    Discharge Plan: Wife to     DISCHARGE SUMMARY    NAME:Paulo Courtney Valarie: 1950  MRN: [id number]    The patient Parrish Kunz exhibits the ability to control behavior in a less restrictive environment. Patient's level of functioning is improving. No assaultive/destructive behavior has been observed for the past 24 hours. No suicidal/homicidal threat or behavior has been observed for the past 24 hours. There is no evidence of serious medication side effects. Patient has not been in physical or protective restraints for at least the past 24 hours.     If weapons involved, how are they secured? Guns removed    Is patient aware of and in agreement with discharge plan? yes    Arrangements for medication:  Prescriptions sent to pharmacy, given a 30 day supply. Copy of discharge instructions to provider?:  Yes    Arrangements for transportation home:  Wife to     Keep all follow up appointments as scheduled, continue to take prescribed medications per physician instructions. Mental health crisis number:  593 or your local mental health crisis line number at [place] [address] Emergency WARM LINE      [phone])      M-F: 9am to 9pm      Sat & Sun: 5pm  9pm  National suicide prevention lines:                             0-846-KJEDXUT (8-004-658-596-837-0440)       1-617-417-TALK (1-996.703.4999)   24/7 Crisis Text Line:  Text HOME to 288998      Discharge Medication List and Instructions:   Current Discharge Medication List      START taking these medications    Details   hydrOXYzine HCL (ATARAX) 25 mg tablet Take 1 Tablet by mouth two (2) times daily as needed for Itching for up to 60 days. Indications: anxious  Qty: 60 Tablet, Refills: 1  Start date: 5/24/2021, End date: 7/23/2021      amLODIPine (NORVASC) 5 mg tablet Take 1 Tablet by mouth daily. Indications: high blood pressure  Qty: 30 Tablet, Refills: 1  Start date: 5/22/2021      aspirin 81 mg chewable tablet Take 1 Tablet by mouth daily. Indications: treatment to prevent a heart attack  Qty: 30 Tablet, Refills: 1  Start date: 5/22/2021      lithium carbonate CR (ESKALITH CR) 450 mg CR tablet Take 1 Tablet by mouth every twelve (12) hours. Indications: nick  Qty: 60 Tablet, Refills: 1  Start date: 5/21/2021      melatonin 3 mg tablet Take 2 Tablets by mouth nightly. Indications: insomnia  Qty: 60 Tablet, Refills: 1  Start date: 5/21/2021         CONTINUE these medications which have NOT CHANGED    Details   glipiZIDE SR (GLUCOTROL XL) 5 mg CR tablet Take 5 mg by mouth daily.  Indications: type 2 diabetes mellitus      therapeutic multivitamin (THERAGRAN) tablet Take 1 Tab by mouth daily. traZODone (DESYREL) 50 mg tablet Take 50 mg by mouth nightly as needed for Sleep.      ezetimibe (ZETIA) 10 mg tablet Take 10 mg by mouth daily. Indications: excessive fat in the blood      rosuvastatin (CRESTOR) 20 mg tablet Take 20 mg by mouth nightly. Indications: excessive fat in the blood      mycophenolate (CELLCEPT) 500 mg tablet Take 1 Tab by mouth two (2) times a day. Qty: 180 Tab, Refills: 2    Associated Diagnoses: Myasthenia gravis ([address]      pyridostigmine (MESTINON) 60 mg tablet 1/2 tab three times daily  Qty: 135 Tab, Refills: 2         STOP taking these medications       predniSONE (DELTASONE) 20 mg tablet Comments:   Reason for Stopping:         escitalopram oxalate (LEXAPRO) 20 mg tablet Comments:   Reason for Stopping:         lisinopril-hydroCHLOROthiazide (PRINZIDE, ZESTORETIC) 20-25 mg per tablet Comments:   Reason for Stopping:               Unresulted Labs (24h ago, onward) Comment    None        To obtain results of studies pending at discharge, please contact [phone]    Follow-up Information     Follow up With Specialties Details Why Contact Info    Isael Aaron MD Family Medicine Go on 6/3/2021 Please attend your appointment at 3pm in the office [address] [place] [address] Service Board  Call on 5/24/2021 Please call Rapid Access at [phone] between 8:00AM -2:00PM to complete intake assessment for ongoing mental health case management, therapy and medication management. [facility], [address]     Phone: [phone]  Fax: [phone]    Crisis Services: [phone]            Advanced Directive:   Does the patient have an appointed surrogate decision maker? No  Does the patient have a Medical Advance Directive? No  Does the patient have a Psychiatric Advance Directive?  No  If the patient does not have a surrogate or Medical Advance Directive AND Psychiatric Advance Directive, the patient was offered information on these advance directives Patient will complete at a later time    Patient Instructions: Please continue all medications until otherwise directed by physician. Tobacco Cessation Discharge Plan:   Is the patient a smoker and needs referral for smoking cessation? Not applicable  Patient referred to the following for smoking cessation with an appointment? Not applicable     Patient was offered medication to assist with smoking cessation at discharge? Not applicable  Was education for smoking cessation added to the discharge instructions? Not applicable    Alcohol/Substance Abuse Discharge Plan:   Does the patient have a history of substance/alcohol abuse and requires a referral for treatment? Not applicable  Patient referred to the following for substance/alcohol abuse treatment with an appointment? Not applicable  Patient was offered medication to assist with alcohol cessation at discharge? Not applicable  Was education for substance/alcohol abuse added to discharge instructions? Not applicable    Patient discharged to Home; discussed with patient/caregiver and provided to the patient/caregiver either in hard copy or electronically.

## 2021-05-24 NOTE — BH NOTES
GROUP THERAPY PROGRESS NOTE    Patient did not participate in Discharge Planning Group.      Ector Najera MSW
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
## 2021-05-24 NOTE — PROGRESS NOTES
Problem: Falls - Risk of  Goal: *Absence of Falls  Description: Document Obie Magallon Fall Risk and appropriate interventions in the flowsheet. Outcome: Progressing Towards Goal  Note: Fall Risk Interventions:     Medication Interventions: Teach patient to arise slowly    Problem: Manic Behavior (Adult/Pediatric)  Goal: *STG: Participates in treatment plan  Outcome: Progressing Towards Goal    3611-8220 Shift report received from [facility]  9068- 3640 Patient met in his room, he was cooperative, alert and oriented x 4. Denied SI/HI/VH/AH, anxiety and depression. He took his due medication. Complained of nausea, PRN zofran 4 mg and Atarax 25 mg given with good effect. Quick 15 minutes checks is ongoing. No violence recorded.      2130- 0200 Patient sleeping. No behavioral health issues recorded. Q15 minutes checks ongoing. 3858-8911 Patient asleep. No behavioral health issues recorded. 0400- 0600 Patient resting quietly his room, no issues with him. Q15 minutes checks carried out. At 6 am, he slept a total of 7.75 hours.

## 2021-05-27 LAB — VIT B1 BLD-SCNC: 167.2 NMOL/L (ref 66.5–200)

## 2021-07-19 ENCOUNTER — OFFICE VISIT (OUTPATIENT)
Dept: NEUROLOGY | Age: 71
End: 2021-07-19
Payer: MEDICARE

## 2021-07-19 VITALS
BODY MASS INDEX: 27.82 KG/M2 | HEIGHT: 65 IN | DIASTOLIC BLOOD PRESSURE: 88 MMHG | HEART RATE: 88 BPM | OXYGEN SATURATION: 98 % | WEIGHT: 167 LBS | SYSTOLIC BLOOD PRESSURE: 140 MMHG

## 2021-07-19 DIAGNOSIS — G70.00 OCULAR MYASTHENIA GRAVIS (HCC): Primary | ICD-10-CM

## 2021-07-19 PROCEDURE — G8419 CALC BMI OUT NRM PARAM NOF/U: HCPCS | Performed by: PSYCHIATRY & NEUROLOGY

## 2021-07-19 PROCEDURE — G8427 DOCREV CUR MEDS BY ELIG CLIN: HCPCS | Performed by: PSYCHIATRY & NEUROLOGY

## 2021-07-19 PROCEDURE — G8510 SCR DEP NEG, NO PLAN REQD: HCPCS | Performed by: PSYCHIATRY & NEUROLOGY

## 2021-07-19 PROCEDURE — G8536 NO DOC ELDER MAL SCRN: HCPCS | Performed by: PSYCHIATRY & NEUROLOGY

## 2021-07-19 PROCEDURE — 99214 OFFICE O/P EST MOD 30 MIN: CPT | Performed by: PSYCHIATRY & NEUROLOGY

## 2021-07-19 PROCEDURE — 1101F PT FALLS ASSESS-DOCD LE1/YR: CPT | Performed by: PSYCHIATRY & NEUROLOGY

## 2021-07-19 PROCEDURE — 3017F COLORECTAL CA SCREEN DOC REV: CPT | Performed by: PSYCHIATRY & NEUROLOGY

## 2021-07-19 RX ORDER — LISINOPRIL AND HYDROCHLOROTHIAZIDE 20; 25 MG/1; MG/1
TABLET ORAL
COMMUNITY
Start: 2021-06-16

## 2021-07-19 RX ORDER — PREDNISONE 20 MG/1
20 TABLET ORAL DAILY
Qty: 90 TABLET | Refills: 2 | Status: SHIPPED | OUTPATIENT
Start: 2021-07-19 | End: 2022-03-23

## 2021-07-19 NOTE — PROGRESS NOTES
Chief Complaint   Patient presents with    Follow-up     Myasthenia Gravis, Ocular Myasthenia Gravis \" I was [facility] where they changed all my medications, and took me off Prednisone, so I have been having some blurred vision. \"     Visit Vitals  BP (!) 140/88 (BP 1 Location: Left upper arm, BP Patient Position: Sitting)   Pulse 88   Ht 5' 5\" (1.651 m)   Wt 167 lb (75.8 kg)   SpO2 98%   BMI 27.79 kg/m²

## 2021-07-19 NOTE — LETTER
7/19/2021    Patient: Nelson Lyon   YOB: 1950   Date of Visit: 7/19/2021     Yulia West MD  [address] [address]  Via Fax: [phone]    Dear Yulia West MD,      Thank you for referring Mr. Nelson Lyon to [address] [place] [address] for evaluation. My notes for this consultation are attached. If you have questions, please do not hesitate to call me. I look forward to following your patient along with you. Sincerely,    Ayla Nelida, DO    7/19/2021     Patient:  Nelson Lyon   YOB: 1950  Date of Visit: 7/19/2021      Dear Yulia West MD  [address] [address]  Via Fax: [phone]: I was requested by Deejay Thompson MD to evaluate Mr. Nelson Lyon  for   Chief Complaint   Patient presents with    Follow-up     Myasthenia Gravis, Ocular Myasthenia Gravis \" I was admitted in a mental swanson where they changed all my medications, and took me off Prednisone, so I have been having some blurred vision. \"   .     I am recommending the following:     Diagnoses and all orders for this visit:    1. Ocular myasthenia gravis ([address]    Other orders  -     predniSONE (DELTASONE) 20 mg tablet; Take 20 mg by mouth daily.        ----------------------------------------------------------------------------------------------------------------------  Below is my encounter:    Chief Complaint   Patient presents with    Follow-up     Myasthenia Gravis, Ocular Myasthenia Gravis \" I was admitted in a mental swanson where they changed all my medications, and took me off Prednisone, so I have been having some blurred vision. \"       KEYSHA Begum is a 62-year-old gentleman here to follow-up. He has antibody positive (B/B/B, anti-striatal positivity) myasthenia gravis   CT negative for thymoma. When I saw him last he was doing relatively well and we reduce prednisone from 20 to 10 mg daily.   Unfortunately he had a manic exacerbation prompting TDO to psychiatry for quite a few days. During that time prednisone was discontinued. He was initiated on various mood stabilizers to include Depakote and lithium but he has since stopped those medications due to side effects. He tells me he is back to his baseline now. He is still struggling with the effects of his son who is only 25 and is an alcoholic. Clinically regarding his myasthenia, his double vision has reemerged and he has more ptosis. No shortness of breath or difficulty swallowing. Last time he received IVIG was early 2021 when he had Covid pneumonia. Review of Systems   Eyes: Positive for double vision. Respiratory: Negative for shortness of breath. All other systems reviewed and are negative.       Past Medical History:   Diagnosis Date    Anxiety disorder     Cancer ([facility]     PROSTATE; BCCA    COVID-19 01/01/2021    Diabetes ([facility]     type II    GERD (gastroesophageal reflux disease)     Hypertension     Neurological disorder      Family History   Problem Relation Age of Onset    Cancer Brother         LUNG; THROAT    Anesth Problems Neg Hx      Social History     Socioeconomic History    Marital status:      Spouse name: Not on file    Number of children: Not on file    Years of education: Not on file    Highest education level: Not on file   Occupational History    Not on file   Tobacco Use    Smoking status: Never Smoker    Smokeless tobacco: Never Used   Vaping Use    Vaping Use: Never used   Substance and Sexual Activity    Alcohol use: Yes     Comment: 1 drink 3x a week    Drug use: No    Sexual activity: Yes     Partners: Female   Other Topics Concern     Service Not Asked    Blood Transfusions Not Asked    Caffeine Concern Not Asked    Occupational Exposure Not Asked    Hobby Hazards Not Asked    Sleep Concern Not Asked    Stress Concern Not Asked    Weight Concern Not Asked    Special Diet Not Asked    Back Care Not Asked    Exercise Not Asked    Bike Helmet Not Asked   [address] Not Asked    Self-Exams Not Asked   Social History Narrative    Not on file     Social Determinants of Health     Financial Resource Strain:     Difficulty of Paying Living Expenses:    Food Insecurity:     Worried About Running Out of Food in the Last Year:     Ran Out of Food in the Last Year:    Transportation Needs:     Lack of Transportation (Medical):  Lack of Transportation (Non-Medical):    Physical Activity:     Days of Exercise per Week:     Minutes of Exercise per Session:    Stress:     Feeling of Stress :    Social Connections:     Frequency of Communication with Friends and Family:     Frequency of Social Gatherings with Friends and Family:     Attends Bahai Services:     Active Member of Clubs or Organizations:     Attends Club or Organization Meetings:     Marital Status:    Intimate Partner Violence:     Fear of Current or Ex-Partner:     Emotionally Abused:     Physically Abused:     Sexually Abused: Allergies   Allergen Reactions    Codeine Nausea Only    Valproic Acid Diarrhea         Current Outpatient Medications   Medication Sig    predniSONE (DELTASONE) 20 mg tablet Take 20 mg by mouth daily.  amLODIPine (NORVASC) 5 mg tablet Take 1 Tablet by mouth daily. Indications: high blood pressure    melatonin 3 mg tablet Take 2 Tablets by mouth nightly. Indications: insomnia    glipiZIDE SR (GLUCOTROL XL) 5 mg CR tablet Take 5 mg by mouth daily. Indications: type 2 diabetes mellitus    therapeutic multivitamin (THERAGRAN) tablet Take 1 Tab by mouth daily.  ezetimibe (ZETIA) 10 mg tablet Take 10 mg by mouth daily. Indications: excessive fat in the blood    rosuvastatin (CRESTOR) 20 mg tablet Take 20 mg by mouth nightly. Indications: excessive fat in the blood    traZODone (DESYREL) 50 mg tablet Take 50 mg by mouth nightly as needed for Sleep.     mycophenolate (CELLCEPT) 500 mg tablet Take 1 Tab by mouth two (2) times a day.  pyridostigmine (MESTINON) 60 mg tablet 1/2 tab three times daily (Patient taking differently: Take 30 mg by mouth three (3) times daily. 1/2 tab three times daily  Indications: myasthenia gravis, a skeletal muscle disorder)    lisinopril-hydroCHLOROthiazide (PRINZIDE, ZESTORETIC) 20-25 mg per tablet TAKE 1 TABLET BY MOUTH EVERY DAY (Patient not taking: Reported on 7/19/2021)    hydrOXYzine HCL (ATARAX) 25 mg tablet Take 1 Tablet by mouth two (2) times daily as needed for Itching for up to 60 days. Indications: anxious (Patient not taking: Reported on 7/19/2021)    aspirin 81 mg chewable tablet Take 1 Tablet by mouth daily. Indications: treatment to prevent a heart attack (Patient not taking: Reported on 7/19/2021)     No current facility-administered medications for this visit. Neurologic Exam     Mental Status   VSS  A&O x 3    PERRL, nonicteric, EOMI with left ptosis, diplopia elicited in far left gaze  Face is symmetric, tongue midline  Speech is fluent and clear  No limb ataxia. No abnl movements. Left upper extremity 4/5 giveaway secondary to shoulder pain otherwise other extremities are 5/5  Moving all extemities spontaneously and symmetric  Normal gait       Visit Vitals  BP (!) 140/88 (BP 1 Location: Left upper arm, BP Patient Position: Sitting)   Pulse 88   Ht 5' 5\" (1.651 m)   Wt 167 lb (75.8 kg)   SpO2 98%   BMI 27.79 kg/m²       Assessment and Plan   Diagnoses and all orders for this visit:    1. Ocular myasthenia gravis ([facility]    Other orders  -     predniSONE (DELTASONE) 20 mg tablet; Take 20 mg by mouth daily. 28-year-old gentleman with myasthenia gravis who has what seems to be an ocular exacerbation. He has been off of prednisone now which is likely contributing. We are going to resume prednisone at 20 mg daily. Continue CellCept 500 twice daily and Mestinon 30 mg 3 times daily.   We had a long reinforced conversation about ER precautions. I do think he is acceptable to go home at this point. Extremity strength intact and volume of voice very good and sound. Main deficit is the left ptosis and diplopia. If however those symptoms get worse and/or he develops shortness of breath or difficulty swallowing generalized weakness I need him to come back to the hospital immediately so we can consider initiating a course of IVIG. He understands the plan. I have ordered the prednisone start today. I will see him in October. 30 minutes of time was spent total reviewing the medical record today to also include face-to-face time with examination, discussion of his hospitalization recently, completion of documentation, and personal review of the MRI brain completed during his hospitalization. I reviewed and decided to continue the current medications. This clinical note was dictated with an electronic dictation software that can make unintentional errors. If there are any questions, please contact me directly for clarification. Thank you for giving me the opportunity to assist in the care of Mr. Cee Wilder. If you have questions, please do not hesitate to contact me.         Sincerely,      Charlene Martin, DO  Neurologist  Brain Injury Medicine  Diplomate ABPN

## 2021-07-19 NOTE — PROGRESS NOTES
Chief Complaint   Patient presents with    Follow-up     Myasthenia Gravis, Ocular Myasthenia Gravis \" I was admitted in a mental swanson where they changed all my medications, and took me off Prednisone, so I have been having some blurred vision. \"       HPI        Yoan Orr is a 76-year-old gentleman here to follow-up. He has antibody positive (B/B/B, anti-striatal positivity) myasthenia gravis   CT negative for thymoma. When I saw him last he was doing relatively well and we reduce prednisone from 20 to 10 mg daily. Unfortunately he had a manic exacerbation prompting TDO to psychiatry for quite a few days. During that time prednisone was discontinued. He was initiated on various mood stabilizers to include Depakote and lithium but he has since stopped those medications due to side effects. He tells me he is back to his baseline now. He is still struggling with the effects of his son who is only 25 and is an alcoholic. Clinically regarding his myasthenia, his double vision has reemerged and he has more ptosis. No shortness of breath or difficulty swallowing. Last time he received IVIG was early 2021 when he had Covid pneumonia. Review of Systems   Eyes: Positive for double vision. Respiratory: Negative for shortness of breath. All other systems reviewed and are negative.       Past Medical History:   Diagnosis Date    Anxiety disorder     Cancer ([facility]     PROSTATE; BCCA    COVID-19 01/01/2021    [facility]     type II    GERD (gastroesophageal reflux disease)     Hypertension     Neurological disorder      Family History   Problem Relation Age of Onset    Cancer Brother         LUNG; THROAT    Anesth Problems Neg Hx      Social History     Socioeconomic History    Marital status:      Spouse name: Not on file    Number of children: Not on file    Years of education: Not on file    Highest education level: Not on file   Occupational History    Not on file   Tobacco Use  Smoking status: Never Smoker    Smokeless tobacco: Never Used   Vaping Use    Vaping Use: Never used   Substance and Sexual Activity    Alcohol use: Yes     Comment: 1 drink 3x a week    Drug use: No    Sexual activity: Yes     Partners: Female   Other Topics Concern     Service Not Asked    Blood Transfusions Not Asked    Caffeine Concern Not Asked    Occupational Exposure Not Asked    Hobby Hazards Not Asked    Sleep Concern Not Asked    Stress Concern Not Asked    Weight Concern Not Asked    Special Diet Not Asked    Back Care Not Asked    Exercise Not Asked    Bike Helmet Not Asked   [address] Not Asked    Self-Exams Not Asked   Social History Narrative    Not on file     Social Determinants of Health     Financial Resource Strain:     Difficulty of Paying Living Expenses:    Food Insecurity:     Worried About Running Out of Food in the Last Year:     [address] in the Last Year:    Transportation Needs:     Lack of Transportation (Medical):  Lack of Transportation (Non-Medical):    Physical Activity:     Days of Exercise per Week:     Minutes of Exercise per Session:    Stress:     Feeling of Stress :    Social Connections:     Frequency of Communication with Friends and Family:     Frequency of Social Gatherings with Friends and Family:     Attends Religion Services:     Active Member of Clubs or Organizations:     Attends Club or Organization Meetings:     Marital Status:    Intimate Partner Violence:     Fear of Current or Ex-Partner:     Emotionally Abused:     Physically Abused:     Sexually Abused: Allergies   Allergen Reactions    Codeine Nausea Only    Valproic Acid Diarrhea         Current Outpatient Medications   Medication Sig    predniSONE (DELTASONE) 20 mg tablet Take 20 mg by mouth daily.  amLODIPine (NORVASC) 5 mg tablet Take 1 Tablet by mouth daily.  Indications: high blood pressure    melatonin 3 mg tablet Take 2 Tablets by mouth nightly. Indications: insomnia    glipiZIDE SR (GLUCOTROL XL) 5 mg CR tablet Take 5 mg by mouth daily. Indications: type 2 diabetes mellitus    therapeutic multivitamin (THERAGRAN) tablet Take 1 Tab by mouth daily.  ezetimibe (ZETIA) 10 mg tablet Take 10 mg by mouth daily. Indications: excessive fat in the blood    rosuvastatin (CRESTOR) 20 mg tablet Take 20 mg by mouth nightly. Indications: excessive fat in the blood    traZODone (DESYREL) 50 mg tablet Take 50 mg by mouth nightly as needed for Sleep.  mycophenolate (CELLCEPT) 500 mg tablet Take 1 Tab by mouth two (2) times a day.  pyridostigmine (MESTINON) 60 mg tablet 1/2 tab three times daily (Patient taking differently: Take 30 mg by mouth three (3) times daily. 1/2 tab three times daily  Indications: myasthenia gravis, a skeletal muscle disorder)    lisinopril-hydroCHLOROthiazide (PRINZIDE, ZESTORETIC) 20-25 mg per tablet TAKE 1 TABLET BY MOUTH EVERY DAY (Patient not taking: Reported on 7/19/2021)    hydrOXYzine HCL (ATARAX) 25 mg tablet Take 1 Tablet by mouth two (2) times daily as needed for Itching for up to 60 days. Indications: anxious (Patient not taking: Reported on 7/19/2021)    aspirin 81 mg chewable tablet Take 1 Tablet by mouth daily. Indications: treatment to prevent a heart attack (Patient not taking: Reported on 7/19/2021)     No current facility-administered medications for this visit. Neurologic Exam     Mental Status   VSS  A&O x 3    PERRL, nonicteric, EOMI with left ptosis, diplopia elicited in far left gaze  Face is symmetric, tongue midline  Speech is fluent and clear  No limb ataxia. No abnl movements.   Left upper extremity 4/5 giveaway secondary to shoulder pain otherwise other extremities are 5/5  Moving all extemities spontaneously and symmetric  Normal gait       Visit Vitals  BP (!) 140/88 (BP 1 Location: Left upper arm, BP Patient Position: Sitting)   Pulse 88   Ht 5' 5\" (1.651 m)   Wt 167 lb (75.8 kg) SpO2 98%   BMI 27.79 kg/m²       Assessment and Plan   Diagnoses and all orders for this visit:    1. Ocular myasthenia gravis ([address]    Other orders  -     predniSONE (DELTASONE) 20 mg tablet; Take 20 mg by mouth daily. 67-year-old gentleman with myasthenia gravis who has what seems to be an ocular exacerbation. He has been off of prednisone now which is likely contributing. We are going to resume prednisone at 20 mg daily. Continue CellCept 500 twice daily and Mestinon 30 mg 3 times daily. We had a long reinforced conversation about ER precautions. I do think he is acceptable to go home at this point. Extremity strength intact and volume of voice very good and sound. Main deficit is the left ptosis and diplopia. If however those symptoms get worse and/or he develops shortness of breath or difficulty swallowing generalized weakness I need him to come back to the hospital immediately so we can consider initiating a course of IVIG. He understands the plan. I have ordered the prednisone start today. I will see him in October. 30 minutes of time was spent total reviewing the medical record today to also include face-to-face time with examination, discussion of his hospitalization recently, completion of documentation, and personal review of the MRI brain completed during his hospitalization. I reviewed and decided to continue the current medications. This clinical note was dictated with an electronic dictation software that can make unintentional errors. If there are any questions, please contact me directly for clarification.       Sedrick Alvarez, [address]  Diplomate STEFANIA

## 2021-10-05 ENCOUNTER — OFFICE VISIT (OUTPATIENT)
Dept: NEUROLOGY | Age: 71
End: 2021-10-05
Payer: MEDICARE

## 2021-10-05 VITALS
WEIGHT: 171 LBS | HEIGHT: 65 IN | OXYGEN SATURATION: 98 % | SYSTOLIC BLOOD PRESSURE: 138 MMHG | BODY MASS INDEX: 28.49 KG/M2 | DIASTOLIC BLOOD PRESSURE: 82 MMHG | HEART RATE: 92 BPM

## 2021-10-05 DIAGNOSIS — G70.00 OCULAR MYASTHENIA GRAVIS (HCC): Primary | ICD-10-CM

## 2021-10-05 DIAGNOSIS — G70.00 MYASTHENIA GRAVIS (HCC): ICD-10-CM

## 2021-10-05 PROCEDURE — 3017F COLORECTAL CA SCREEN DOC REV: CPT | Performed by: PSYCHIATRY & NEUROLOGY

## 2021-10-05 PROCEDURE — 1101F PT FALLS ASSESS-DOCD LE1/YR: CPT | Performed by: PSYCHIATRY & NEUROLOGY

## 2021-10-05 PROCEDURE — G8419 CALC BMI OUT NRM PARAM NOF/U: HCPCS | Performed by: PSYCHIATRY & NEUROLOGY

## 2021-10-05 PROCEDURE — G8536 NO DOC ELDER MAL SCRN: HCPCS | Performed by: PSYCHIATRY & NEUROLOGY

## 2021-10-05 PROCEDURE — G8432 DEP SCR NOT DOC, RNG: HCPCS | Performed by: PSYCHIATRY & NEUROLOGY

## 2021-10-05 PROCEDURE — 99213 OFFICE O/P EST LOW 20 MIN: CPT | Performed by: PSYCHIATRY & NEUROLOGY

## 2021-10-05 PROCEDURE — G8427 DOCREV CUR MEDS BY ELIG CLIN: HCPCS | Performed by: PSYCHIATRY & NEUROLOGY

## 2021-10-05 RX ORDER — MYCOPHENOLATE MOFETIL 500 MG/1
1000 TABLET ORAL 2 TIMES DAILY
Qty: 360 TABLET | Refills: 2 | Status: SHIPPED | OUTPATIENT
Start: 2021-10-05 | End: 2022-10-10

## 2021-10-05 RX ORDER — PYRIDOSTIGMINE BROMIDE 60 MG/1
30 TABLET ORAL 3 TIMES DAILY
Qty: 135 TABLET | Refills: 3 | Status: SHIPPED | OUTPATIENT
Start: 2021-10-05

## 2021-10-05 RX ORDER — ALBUTEROL SULFATE 90 UG/1
AEROSOL, METERED RESPIRATORY (INHALATION)
COMMUNITY
Start: 2021-08-10

## 2021-10-05 NOTE — PROGRESS NOTES
Chief Complaint   Patient presents with    Follow-up     Myasthenia gravis     Visit Vitals  /82 (BP 1 Location: Right upper arm, BP Patient Position: Sitting)   Pulse 92   Ht 5' 5\" (1.651 m)   Wt 171 lb (77.6 kg)   SpO2 98%   BMI 28.46 kg/m²

## 2021-10-05 NOTE — PROGRESS NOTES
Chief Complaint   Patient presents with    Follow-up     Myasthenia gravis       HPI        Yulisa Diez is a 71-year-old gentleman here to follow-up. He has antibody positive (B/B/B, anti-striatal positivity) myasthenia gravis   CT negative for thymoma. At his last visit we maintain prednisone 20 mg as well as Mestinon and CellCept. Diplopia is getting better and much less frequent. He has a hard time remembering to take the midday Mestinon. No new acute medical issues since I saw him last.  He is working full-time. Last time he received IVIG was early 2021 when he had Covid pneumonia. Review of Systems   Eyes: Positive for double vision. Respiratory: Negative for shortness of breath. All other systems reviewed and are negative.       Past Medical History:   Diagnosis Date    Anxiety disorder     Cancer ([facility]     PROSTATE; BCCA    COVID-19 01/01/2021    [facility]     type II    GERD (gastroesophageal reflux disease)     Hypertension     Neurological disorder      Family History   Problem Relation Age of Onset    Cancer Brother         LUNG; THROAT    Anesth Problems Neg Hx      Social History     Socioeconomic History    Marital status:      Spouse name: Not on file    Number of children: Not on file    Years of education: Not on file    Highest education level: Not on file   Occupational History    Not on file   Tobacco Use    Smoking status: Never Smoker    Smokeless tobacco: Never Used   Vaping Use    Vaping Use: Never used   Substance and Sexual Activity    Alcohol use: Yes     Comment: 1 drink 3x a week    Drug use: No    Sexual activity: Yes     Partners: Female   Other Topics Concern     Service Not Asked    Blood Transfusions Not Asked    Caffeine Concern Not Asked    Occupational Exposure Not Asked    Hobby Hazards Not Asked    Sleep Concern Not Asked    Stress Concern Not Asked    Weight Concern Not Asked    Special Diet Not Asked    Back Care Not Asked    Exercise Not Asked    Bike Helmet Not Asked   [address] Not Asked    Self-Exams Not Asked   Social History Narrative    Not on file     Social Determinants of Health     Financial Resource Strain:     Difficulty of Paying Living Expenses:    Food Insecurity:     Worried About Running Out of Food in the Last Year:     Ran Out of Food in the Last Year:    Transportation Needs:     Lack of Transportation (Medical):  Lack of Transportation (Non-Medical):    Physical Activity:     Days of Exercise per Week:     Minutes of Exercise per Session:    Stress:     Feeling of Stress :    Social Connections:     Frequency of Communication with Friends and Family:     Frequency of Social Gatherings with Friends and Family:     Attends Quaker Services:     Active Member of Clubs or Organizations:     Attends Club or Organization Meetings:     Marital Status:    Intimate Partner Violence:     Fear of Current or Ex-Partner:     Emotionally Abused:     Physically Abused:     Sexually Abused: Allergies   Allergen Reactions    Codeine Nausea Only    Valproic Acid Diarrhea         Current Outpatient Medications   Medication Sig    albuterol (PROVENTIL HFA, VENTOLIN HFA, PROAIR HFA) 90 mcg/actuation inhaler INHALE 1 PUFF BY MOUTH EVERY 4 HOURS AS NEEDED    mycophenolate (CELLCEPT) 500 mg tablet Take 2 Tablets by mouth two (2) times a day. Indications: Immunosuppression    pyridostigmine (MESTINON) 60 mg tablet Take 0.5 Tablets by mouth three (3) times daily. 1/2 tab three times daily  Indications: myasthenia gravis, a skeletal muscle disorder    predniSONE (DELTASONE) 20 mg tablet Take 20 mg by mouth daily.  amLODIPine (NORVASC) 5 mg tablet Take 1 Tablet by mouth daily. Indications: high blood pressure    aspirin 81 mg chewable tablet Take 1 Tablet by mouth daily. Indications: treatment to prevent a heart attack    melatonin 3 mg tablet Take 2 Tablets by mouth nightly. Indications: insomnia    glipiZIDE SR (GLUCOTROL XL) 5 mg CR tablet Take 5 mg by mouth daily. Indications: type 2 diabetes mellitus    therapeutic multivitamin (THERAGRAN) tablet Take 1 Tab by mouth daily.  ezetimibe (ZETIA) 10 mg tablet Take 10 mg by mouth daily. Indications: excessive fat in the blood    rosuvastatin (CRESTOR) 20 mg tablet Take 20 mg by mouth nightly. Indications: excessive fat in the blood    traZODone (DESYREL) 50 mg tablet Take 50 mg by mouth nightly as needed for Sleep.  lisinopril-hydroCHLOROthiazide (PRINZIDE, ZESTORETIC) 20-25 mg per tablet TAKE 1 TABLET BY MOUTH EVERY DAY (Patient not taking: Reported on 7/19/2021)     No current facility-administered medications for this visit. Neurologic Exam     Mental Status   VSS  A&O x 3    PERRL, nonicteric, EOMI with left ptosis  Face is symmetric, tongue midline  Speech is fluent and clear  No limb ataxia. No abnl movements. Left upper extremity 4/5 giveaway secondary to shoulder pain otherwise other extremities are 5/5  Moving all extemities spontaneously and symmetric  Normal gait       Visit Vitals  /82 (BP 1 Location: Right upper arm, BP Patient Position: Sitting)   Pulse 92   Ht 5' 5\" (1.651 m)   Wt 171 lb (77.6 kg)   SpO2 98%   BMI 28.46 kg/m²       Assessment and Plan   Diagnoses and all orders for this visit:    1. Ocular myasthenia gravis ([address]  -     METABOLIC PANEL, COMPREHENSIVE; Future  -     CBC W/O DIFF; Future    2. Myasthenia gravis (HCC)  -     mycophenolate (CELLCEPT) 500 mg tablet; Take 2 Tablets by mouth two (2) times a day. Indications: Immunosuppression  -     METABOLIC PANEL, COMPREHENSIVE; Future  -     CBC W/O DIFF; Future    Other orders  -     pyridostigmine (MESTINON) 60 mg tablet; Take 0.5 Tablets by mouth three (3) times daily.  1/2 tab three times daily  Indications: myasthenia gravis, a skeletal muscle disorder           64-year-old gentleman with myasthenia gravis who has what seems to be an ocular exacerbation that appears to be still a little present now with left ptosis. Overall he is improved from previous. He does not tolerate excessive prednisones I am going to keep it at 20 mg and we are now going to increase CellCept to 1 g twice daily. Maintain Mestinon as is twice daily at the minimum add a midday dose if needed. We had a long reinforced conversation about ER precautions. No restrictions on his work. Use good judgment regarding strength. ER precautions again emphasized. I would like to see him in about 3 months. 20 minutes of time was spent total reviewing the medical record today to also include face-to-face time with examination, completion of documentation. I reviewed and decided to continue the current medications. This clinical note was dictated with an electronic dictation software that can make unintentional errors. If there are any questions, please contact me directly for clarification.       [facility], [facility] [address]  Diplomate SUNITAN

## 2021-10-06 LAB
ALBUMIN SERPL-MCNC: 4.5 G/DL (ref 3.8–4.8)
ALBUMIN/GLOB SERPL: 2 {RATIO} (ref 1.2–2.2)
ALP SERPL-CCNC: 56 IU/L (ref 44–121)
ALT SERPL-CCNC: 15 IU/L (ref 0–44)
AST SERPL-CCNC: 18 IU/L (ref 0–40)
BILIRUB SERPL-MCNC: 0.3 MG/DL (ref 0–1.2)
BUN SERPL-MCNC: 10 MG/DL (ref 8–27)
BUN/CREAT SERPL: 12 (ref 10–24)
CALCIUM SERPL-MCNC: 10 MG/DL (ref 8.6–10.2)
CHLORIDE SERPL-SCNC: 102 MMOL/L (ref 96–106)
CO2 SERPL-SCNC: 24 MMOL/L (ref 20–29)
CREAT SERPL-MCNC: 0.82 MG/DL (ref 0.76–1.27)
ERYTHROCYTE [DISTWIDTH] IN BLOOD BY AUTOMATED COUNT: 14.4 % (ref 11.6–15.4)
GLOBULIN SER CALC-MCNC: 2.2 G/DL (ref 1.5–4.5)
GLUCOSE SERPL-MCNC: 119 MG/DL (ref 65–99)
HCT VFR BLD AUTO: 47.6 % (ref 37.5–51)
HGB BLD-MCNC: 15.6 G/DL (ref 13–17.7)
MCH RBC QN AUTO: 29.4 PG (ref 26.6–33)
MCHC RBC AUTO-ENTMCNC: 32.8 G/DL (ref 31.5–35.7)
MCV RBC AUTO: 90 FL (ref 79–97)
PLATELET # BLD AUTO: 176 X10E3/UL (ref 150–450)
POTASSIUM SERPL-SCNC: 4.5 MMOL/L (ref 3.5–5.2)
PROT SERPL-MCNC: 6.7 G/DL (ref 6–8.5)
RBC # BLD AUTO: 5.31 X10E6/UL (ref 4.14–5.8)
SODIUM SERPL-SCNC: 139 MMOL/L (ref 134–144)
WBC # BLD AUTO: 13.6 X10E3/UL (ref 3.4–10.8)

## 2022-03-18 PROBLEM — F30.9 MANIA (HCC): Status: ACTIVE | Noted: 2021-05-13

## 2022-03-23 RX ORDER — PREDNISONE 20 MG/1
TABLET ORAL
Qty: 90 TABLET | Refills: 2 | Status: SHIPPED | OUTPATIENT
Start: 2022-03-23

## 2022-10-08 DIAGNOSIS — G70.00 MYASTHENIA GRAVIS (HCC): ICD-10-CM

## 2022-10-10 RX ORDER — MYCOPHENOLATE MOFETIL 500 MG/1
TABLET ORAL
Qty: 360 TABLET | Refills: 1 | Status: SHIPPED | OUTPATIENT
Start: 2022-10-10

## 2022-12-19 RX ORDER — PYRIDOSTIGMINE BROMIDE 60 MG/1
TABLET ORAL
Qty: 135 TABLET | Refills: 3 | Status: SHIPPED | OUTPATIENT
Start: 2022-12-19

## 2023-01-20 RX ORDER — PREDNISONE 20 MG/1
TABLET ORAL
Qty: 90 TABLET | Refills: 2 | Status: SHIPPED | OUTPATIENT
Start: 2023-01-20

## 2023-03-20 ENCOUNTER — TELEPHONE (OUTPATIENT)
Dept: NEUROLOGY | Age: 73
End: 2023-03-20

## 2023-03-20 NOTE — TELEPHONE ENCOUNTER
Molly Key  Message from [address] fax over the RX for Mycophenolate 500 mg. The fax number is [phone].  If it is easier the E-scibe number is [phone]\"

## 2023-06-19 ENCOUNTER — TELEPHONE (OUTPATIENT)
Age: 73
End: 2023-06-19

## 2023-06-19 NOTE — TELEPHONE ENCOUNTER
Called patient's wife. Informed her patient has not been seen since 2021. Needs to be seen every year for refills. Stated that I see he has an appointment tomorrow. Stated once seen, provider will fill.

## 2023-06-20 ENCOUNTER — OFFICE VISIT (OUTPATIENT)
Age: 73
End: 2023-06-20
Payer: MEDICARE

## 2023-06-20 VITALS
OXYGEN SATURATION: 96 % | SYSTOLIC BLOOD PRESSURE: 128 MMHG | HEIGHT: 65 IN | DIASTOLIC BLOOD PRESSURE: 70 MMHG | BODY MASS INDEX: 30.82 KG/M2 | WEIGHT: 185 LBS | HEART RATE: 84 BPM

## 2023-06-20 DIAGNOSIS — Z79.899 HIGH RISK MEDICATION USE: ICD-10-CM

## 2023-06-20 DIAGNOSIS — G70.00 MYASTHENIA GRAVIS WITHOUT (ACUTE) EXACERBATION (HCC): Primary | ICD-10-CM

## 2023-06-20 PROCEDURE — 1036F TOBACCO NON-USER: CPT | Performed by: PSYCHIATRY & NEUROLOGY

## 2023-06-20 PROCEDURE — G8417 CALC BMI ABV UP PARAM F/U: HCPCS | Performed by: PSYCHIATRY & NEUROLOGY

## 2023-06-20 PROCEDURE — 1123F ACP DISCUSS/DSCN MKR DOCD: CPT | Performed by: PSYCHIATRY & NEUROLOGY

## 2023-06-20 PROCEDURE — G8427 DOCREV CUR MEDS BY ELIG CLIN: HCPCS | Performed by: PSYCHIATRY & NEUROLOGY

## 2023-06-20 PROCEDURE — 3017F COLORECTAL CA SCREEN DOC REV: CPT | Performed by: PSYCHIATRY & NEUROLOGY

## 2023-06-20 PROCEDURE — 99214 OFFICE O/P EST MOD 30 MIN: CPT | Performed by: PSYCHIATRY & NEUROLOGY

## 2023-06-20 RX ORDER — PREDNISONE 10 MG/1
TABLET ORAL
Qty: 95 TABLET | Refills: 0 | Status: SHIPPED | OUTPATIENT
Start: 2023-06-20

## 2023-06-20 RX ORDER — PYRIDOSTIGMINE BROMIDE 60 MG/1
TABLET ORAL
Qty: 135 TABLET | Refills: 1 | Status: SHIPPED | OUTPATIENT
Start: 2023-06-20

## 2023-06-20 RX ORDER — MYCOPHENOLATE MOFETIL 500 MG/1
TABLET ORAL
Qty: 360 TABLET | Refills: 1 | Status: SHIPPED | OUTPATIENT
Start: 2023-06-20

## 2023-06-20 ASSESSMENT — PATIENT HEALTH QUESTIONNAIRE - PHQ9
SUM OF ALL RESPONSES TO PHQ9 QUESTIONS 1 & 2: 0
SUM OF ALL RESPONSES TO PHQ QUESTIONS 1-9: 0
1. LITTLE INTEREST OR PLEASURE IN DOING THINGS: 0
2. FEELING DOWN, DEPRESSED OR HOPELESS: 0
SUM OF ALL RESPONSES TO PHQ QUESTIONS 1-9: 0

## 2023-06-20 NOTE — PROGRESS NOTES
Neurology Clinic Follow up Note    Patient ID:  Adrián Pod  [id number]  88 y.o.  1950      Mr. Tomer Kohler is here for follow up today of  Chief Complaint   Patient presents with    Other     Pt transfer from [facility] due to H/O MG Pt reports no concerns at this time          Last Appointment With Me:  None, former pt of [facility]. [facility], last seen 10/5/21    \"Gregory is here to follow-up. He has antibody positive (B/B/B, anti-striatal positivity) myasthenia gravis   CT negative for thymoma. At his last visit we maintain prednisone 20 mg as well as Mestinon and CellCept. Diplopia is getting better and much less frequent. He has a hard time remembering to take the midday Mestinon. No new acute medical issues since I saw him last.  He is working full-time. Last time he received IVIG was early 2021 when he had Covid pneumonia. \"    Interval History:   Patient lost to follow up for 1.5 years, previously seen by Dr. [facility] for MG. He reports MG symptoms are doing well. He previously had L ptosis but reports this was resolved on medication. No current double vision. Denies generalized weakness. Admits to occasional SOB with exertion since COVID per patient, unchanged. Denies dysphagia, dysarthria. He was taking Cellcept 1000mg BID along with prednisone 20mg daily and mestinon 30mg TID. Denies side effects associated with medications. PMHx/ PSHx/ FHx/ SHx:  Reviewed and unchanged previous visit. Past Medical History:   Diagnosis Date    Anxiety disorder     Cancer ([facility]     PROSTATE; BCCA    COVID-19 01/01/2021    Diabetes ([facility]     type II    GERD (gastroesophageal reflux disease)     Hypertension     Neurological disorder        ROS:  Comprehensive review of systems negative except for as noted above.        Objective:       Meds:  Current Outpatient Medications   Medication Sig Dispense Refill    albuterol sulfate HFA (PROVENTIL;VENTOLIN;PROAIR) 108 (90 Base) MCG/ACT inhaler INHALE 1 PUFF BY MOUTH EVERY

## 2023-06-21 LAB
ALBUMIN SERPL-MCNC: 4.4 G/DL (ref 3.7–4.7)
ALBUMIN/GLOB SERPL: 2 {RATIO} (ref 1.2–2.2)
ALP SERPL-CCNC: 59 IU/L (ref 44–121)
ALT SERPL-CCNC: 55 IU/L (ref 0–44)
AST SERPL-CCNC: 43 IU/L (ref 0–40)
BASOPHILS # BLD AUTO: 0.1 X10E3/UL (ref 0–0.2)
BASOPHILS NFR BLD AUTO: 1 %
BILIRUB SERPL-MCNC: 0.5 MG/DL (ref 0–1.2)
BUN SERPL-MCNC: 12 MG/DL (ref 8–27)
BUN/CREAT SERPL: 15 (ref 10–24)
CALCIUM SERPL-MCNC: 9.7 MG/DL (ref 8.6–10.2)
CHLORIDE SERPL-SCNC: 98 MMOL/L (ref 96–106)
CO2 SERPL-SCNC: 21 MMOL/L (ref 20–29)
CREAT SERPL-MCNC: 0.82 MG/DL (ref 0.76–1.27)
EGFRCR SERPLBLD CKD-EPI 2021: 93 ML/MIN/1.73
EOSINOPHIL # BLD AUTO: 0 X10E3/UL (ref 0–0.4)
EOSINOPHIL NFR BLD AUTO: 0 %
ERYTHROCYTE [DISTWIDTH] IN BLOOD BY AUTOMATED COUNT: 12.5 % (ref 11.6–15.4)
GLOBULIN SER CALC-MCNC: 2.2 G/DL (ref 1.5–4.5)
GLUCOSE SERPL-MCNC: 174 MG/DL (ref 70–99)
HCT VFR BLD AUTO: 49.6 % (ref 37.5–51)
HGB BLD-MCNC: 16.5 G/DL (ref 13–17.7)
IMM GRANULOCYTES # BLD AUTO: 0.3 X10E3/UL (ref 0–0.1)
IMM GRANULOCYTES NFR BLD AUTO: 2 %
LYMPHOCYTES # BLD AUTO: 0.8 X10E3/UL (ref 0.7–3.1)
LYMPHOCYTES NFR BLD AUTO: 6 %
MCH RBC QN AUTO: 31.4 PG (ref 26.6–33)
MCHC RBC AUTO-ENTMCNC: 33.3 G/DL (ref 31.5–35.7)
MCV RBC AUTO: 94 FL (ref 79–97)
MONOCYTES # BLD AUTO: 0.7 X10E3/UL (ref 0.1–0.9)
MONOCYTES NFR BLD AUTO: 5 %
NEUTROPHILS # BLD AUTO: 11.7 X10E3/UL (ref 1.4–7)
NEUTROPHILS NFR BLD AUTO: 86 %
PLATELET # BLD AUTO: 168 X10E3/UL (ref 150–450)
POTASSIUM SERPL-SCNC: 5.1 MMOL/L (ref 3.5–5.2)
PROT SERPL-MCNC: 6.6 G/DL (ref 6–8.5)
RBC # BLD AUTO: 5.26 X10E6/UL (ref 4.14–5.8)
SODIUM SERPL-SCNC: 138 MMOL/L (ref 134–144)
WBC # BLD AUTO: 13.6 X10E3/UL (ref 3.4–10.8)

## 2023-06-23 ENCOUNTER — TELEPHONE (OUTPATIENT)
Age: 73
End: 2023-06-23

## 2023-06-23 NOTE — TELEPHONE ENCOUNTER
Called and spoke to the Pt. Two identifiers verified. Per Dr. Suly Gutierrez:    WBC and glucose elevation is likely secondary to chronic steroid use. Anticipate improvement with titration off of prednisone as discussed during his visit. Liver enzymes are minimally elevated-will continue to monitor. Avoid ETOH    Pt Stated an understanding.

## 2023-06-27 LAB
ACHR BIND AB SER-SCNC: 26.4 NMOL/L (ref 0–0.24)
ACHR BLOCK AB SER-ACNC: 59 % (ref 0–25)
ACHR MOD AB SER QL FC: 79 % (ref 0–45)

## 2023-09-05 RX ORDER — PREDNISONE 10 MG/1
TABLET ORAL
Qty: 95 TABLET | Refills: 0 | OUTPATIENT
Start: 2023-09-05

## 2023-11-22 RX ORDER — MYCOPHENOLATE MOFETIL 500 MG/1
TABLET ORAL
Qty: 360 TABLET | Refills: 1 | OUTPATIENT
Start: 2023-11-22

## 2024-02-07 DIAGNOSIS — G70.00 MYASTHENIA GRAVIS WITHOUT (ACUTE) EXACERBATION (HCC): ICD-10-CM

## 2024-02-07 RX ORDER — PREDNISONE 10 MG/1
TABLET ORAL
Qty: 45 TABLET | Refills: 3 | OUTPATIENT
Start: 2024-02-07

## 2024-05-15 DIAGNOSIS — G70.00 MYASTHENIA GRAVIS WITHOUT (ACUTE) EXACERBATION (HCC): Primary | ICD-10-CM

## 2024-05-21 RX ORDER — MYCOPHENOLATE MOFETIL 500 MG/1
TABLET ORAL
Qty: 120 TABLET | Refills: 0 | Status: SHIPPED | OUTPATIENT
Start: 2024-05-21

## 2024-08-09 NOTE — LETTER
9/27/19 Patient: Omar Campbell YOB: 1950 Date of Visit: 9/27/2019 Tammie Johnson MD 
[address] [address] VIA Facsimile: [phone] Dear Tammie Johnson MD, Thank you for referring Mr. Omar Campbell to [address] [place] [address] for evaluation. My notes for this consultation are attached. If you have questions, please do not hesitate to call me. I look forward to following your patient along with you. Sincerely, [facility], DO 
 
9/27/2019 Patient:  Omar Campbell YOB: 1950 Date of Visit: 9/27/2019 Dear Tammie Johnson MD 
[address] [address] VIA Facsimile: [phone] Nolan Levi MD 
[address] [place] [address] VIA Facsimile: [phone] 
 : I was requested by Raji Arzola MD to evaluate Mr. Omar Campbell  for Chief Complaint Patient presents with  Neurologic Problem Double vision Allison Boyer I am recommending the following:  
 
Diagnoses and all orders for this visit: 
 
1. Ocular myasthenia gravis ([facility] -     CT CHEST W WO CONT; Future 2. Diplopia Other orders -     pyridostigmine (MESTINON) 60 mg tablet; 1/2 tab three times daily 
 
 
 
---------------------------------------------------------------------------------------------------------------------- Below is my encounter: Chief Complaint Patient presents with  Neurologic Problem Double vision Referred by: Dr. Robbie Stovall HPI Mr. Nadine Mckinney is a 44-year-old gentleman with diabetes who is here referred by ophthalmology for double vision. I reviewed the medical records and he has been having intermittent double vision for about 6 months and left eye drooping. He has a history of prostate cancer in remission.   Ophthalmology did a ice pack test with immediate reversal of symptoms OFFICE VISIT      Patient: Vincent Brown Date of Service: 2024   : 1990 MRN: [id number]     SUBJECTIVE:     Chief Complaint   Patient presents with    ER F/U     Due to the chest pain from 2024          HISTORY OF PRESENT ILLNESS:  Vincent Brown is a 34 year old male who presents today for follow up from ED visit  Was seen 2024 at [facility] for acute onset of chest pain. Enzymes, XR chest negative  released      PAST MEDICAL HISTORY:  Past Medical History:   Diagnosis Date    No known problems        ACTIVE PROBLEM LIST:  There is no problem list on file for this patient.      MEDICATIONS:  Current Outpatient Medications   Medication Sig    doxycycline hyclate (VIBRAMYCIN) 100 MG capsule Take 1 capsule by mouth daily.     No current facility-administered medications for this visit.       ALLERGIES:  ALLERGIES:  No Known Allergies    PAST SURGICAL HISTORY:  Past Surgical History:   Procedure Laterality Date    No past surgeries         FAMILY HISTORY:  Family History   Problem Relation Age of Onset    Diabetes Mother     Cancer, Colon Father        SOCIAL HISTORY:  Social History     Tobacco Use    Smoking status: Every Day     Types: Cigarettes    Smokeless tobacco: Never   Substance Use Topics    Alcohol use: Yes    Drug use: Never       Review of Systems      OBJECTIVE:     Visit Vitals  /77 (BP Location: LUE - Left upper extremity, Patient Position: Sitting, Cuff Size: Regular)   Pulse 85   Temp 98.3 °F (36.8 °C) (Oral)   Resp 18   Ht 6' 2\" (1.88 m)   Wt 125 kg (275 lb 9.2 oz)   SpO2 98%   BMI 35.38 kg/m²       Physical Exam      DIAGNOSTIC STUDIES:   LAB RESULTS:    {DIAGNOSTIC DATA - CLINIC:421149::\"Lab Results Reviewed\"}  No results found for this visit on 24.        Assessment AND PLAN:   This is a 34 year old year-old male who presents with     No diagnosis found.    Assessment   {Assess/PlanSCherelle:96369:::0}    No follow-ups on file.    Instructions provided as documented in the  AVS.        Medical compliance with plan discussed and risks of non-compliance reviewed.    Patient education completed on disease process, etiology & prognosis.    Patient expresses understanding of the plan.    Proper usage and side effects of medications reviewed & discussed.    Refer to orders.    Return to clinic as clinically indicated as discussed with patient who verbalized understanding of & agreement with the plan.       The {Patient and X:9026175::\"patient\"} indicated understanding of the diagnosis and agreed with the plan of care.           consistent with myasthenia. Blood work ordered and is pending. MRI pending. He denies any shortness of breath or difficulty swallowing. No weakness in the extremities. 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 Review of Systems Eyes: Positive for double vision. Respiratory: Negative for shortness of breath. Neurological: Negative for weakness. All other systems reviewed and are negative. Past Medical History:  
Diagnosis Date  Anxiety disorder  Cancer ([facility] PROSTATE; BCCA  Diabetes ([facility]  GERD (gastroesophageal reflux disease)  Hypertension  Neurological disorder Family History Problem Relation Age of Onset  Cancer Brother LUNG; THROAT  Anesth Problems Neg Hx Social History Socioeconomic History  Marital status:  Spouse name: Not on file  Number of children: Not on file  Years of education: Not on file  Highest education level: Not on file Occupational History  Not on file Social Needs  Financial resource strain: Not on file  Food insecurity:  
  Worry: Not on file Inability: Not on file  Transportation needs:  
  Medical: Not on file Non-medical: Not on file Tobacco Use  Smoking status: Never Smoker  Smokeless tobacco: Never Used Substance and Sexual Activity  Alcohol use: Yes Comment: RARE  Drug use: No  
 Sexual activity: Yes  
  Partners: Female Lifestyle  Physical activity:  
  Days per week: Not on file Minutes per session: Not on file  Stress: Not on file Relationships  Social connections:  
  Talks on phone: Not on file Gets together: Not on file Attends [employer] service: Not on file Active member of club or organization: Not on file Attends meetings of clubs or organizations: Not on file Relationship status: Not on file agreement with the plan.       The patient indicated understanding of the diagnosis and agreed with the plan of care.            Intimate partner violence:  
  Fear of current or ex partner: Not on file Emotionally abused: Not on file Physically abused: Not on file Forced sexual activity: Not on file Other Topics Concern  Not on file Social History Narrative  Not on file Current Outpatient Medications Medication Sig  pyridostigmine (MESTINON) 60 mg tablet 1/2 tab three times daily  omeprazole (PRILOSEC) 40 mg capsule Take 40 mg by mouth daily.  lisinopril (PRINIVIL, ZESTRIL) 20 mg tablet Take  by mouth daily.  atorvastatin (LIPITOR) 40 mg tablet Take  by mouth daily.  glipiZIDE (GLUCOTROL) 5 mg tablet Take  by mouth daily.  MULTIVITAMIN PO Take  by mouth daily.  HYDROcodone-acetaminophen (NORCO) 7.5-325 mg per tablet Take 1 Tab by mouth every six (6) hours as needed for Pain. Max Daily Amount: 4 Tabs.  ZOLPIDEM TARTRATE (AMBIEN PO) Take  by mouth as needed. No current facility-administered medications for this visit. Allergies Allergen Reactions  Codeine Nausea Only Neurologic Exam  
 
Mental Status Oriented to person, place, and time. Cranial Nerves Cranial nerves II through XII intact. Left ptosis with decreased adduction left eye Motor Exam  
Muscle bulk: normal 
 
Strength Strength 5/5 throughout. Sensory Exam  
Light touch normal.  
 
Gait, Coordination, and Reflexes Gait Gait: normal 
 
Coordination Romberg: negative Finger to nose coordination: normal 
 
Physical Exam  
Constitutional: He is oriented to person, place, and time. He appears well-developed and well-nourished. Cardiovascular: Normal rate. Pulmonary/Chest: Effort normal.  
Neurological: He is oriented to person, place, and time. He has normal strength. He has a normal Finger-Nose-Finger Test and a normal Romberg Test. Gait normal.  
Skin: Skin is warm and dry. Psychiatric: His behavior is normal.  
Vitals reviewed. Visit Vitals /60 Pulse (!) 105 Resp 12 Ht 5' 5\" (1.651 m) Wt 80.3 kg (177 lb) SpO2 97% BMI 29.45 kg/m² Lab Results Component Value Date/Time WBC 17.1 (H) 03/10/2015 07:45 AM  
 HGB 10.6 (L) 03/11/2015 03:19 AM  
 HCT 31.9 (L) 03/11/2015 03:19 AM  
 PLATELET 968 02/00/5836 07:45 AM  
 MCV 90.5 03/10/2015 07:45 AM  
 
Lab Results Component Value Date/Time Glucose 147 (H) 03/10/2015 07:45 AM  
 Glucose (POC) 129 (H) 03/11/2015 11:25 AM  
 Creatinine 1.20 (H) 03/10/2015 07:45 AM  
  
No results found for: CHOL, CHOLPOCT, HDL, LDL, LDLC, LDLCPOC, LDLCEXT, TRIGL, TGLPOCT, CHHD, CHHDX Lab Results Component Value Date/Time ALT (SGPT) 21 03/09/2015 01:38 PM  
 AST (SGOT) 13 (L) 03/09/2015 01:38 PM  
 Alk. phosphatase 43 (L) 03/09/2015 01:38 PM  
 Bilirubin, total 0.7 03/09/2015 01:38 PM  
 Albumin 3.5 03/10/2015 07:45 AM  
 Protein, total 6.0 (L) 03/09/2015 01:38 PM  
 INR 1.0 02/24/2015 10:59 AM  
 Prothrombin time 10.2 02/24/2015 10:59 AM  
 PLATELET 153 53/75/5240 07:45 AM  
  
 
CT Results (maximum last 3): Results from [facility] Encounter encounter on 01/23/15 CT ABD PELV W CONT Narrative **Final Report** 
  
 
ICD Codes / Adm. Diagnosis: 185   / Malignant neoplasm of prostate Examination:  CT ABD PELVIS W CON  - [id number] - Jan 23 2015  2:45PM 
Accession No:  [id number] Reason:  Malignant neoplasm of prostate REPORT: 
INDICATION: Prostate cancer. COMPARISON: None. TECHNIQUE:  
Multislice helical CT was performed from the diaphragm to the symphysis  
pubis during uneventful rapid bolus intravenous administration of 97 mL of  
Isovue-370. . Oral contrast was also administered. Delayed images of the  
abdomen were acquired. Contiguous 5 mm axial images were reconstructed and  
lung and soft tissue windows were generated. Coronal reformations were  
generated. FINDINGS: 
The visualized portions of the lung bases are clear.  
 
There are no focal abnormalities within the spleen pancreas or adrenal  
 glands. There is a vague 4 mm low-density in the liver in segment 5 too  
small to characterize but could represent tiny cyst. No renal masses are  
identified. There is a 4 mm calcification lower pole left kidney. There is a  
retroaortic left renal vein. There is an accessory right hepatic artery off of the SMA. . 
 
The aorta tapers without aneurysm. There is no retroperitoneal adenopathy or  
mass. The bowel is normal. The appendix is normal. There is no ascites or free  
intraperitoneal air. There is no pelvic mass or adenopathy. IMPRESSION:  
1. No evidence of metastatic disease. No adenopathy is identified. 2. 4 mm nonobstructing calculus lower pole left kidney. 3. There is a 4 mm low-density in segment 5 of the liver which is too small  
to characterize but most likely a tiny cyst.. Signing/Reading Doctor: Sindy Barnes ([id number]) Gerald Garcia ([id number])  Jan 23 2015  3:44PM                       
 
 
   
 
 
MRI Results (maximum last 3): No results found for this or any previous visit. PET Results (maximum last 3): No results found for this or any previous visit. Assessment and Plan Diagnoses and all orders for this visit: 
 
1. Ocular myasthenia gravis ([address] -     CT CHEST W WO CONT; Future 2. Diplopia Other orders -     pyridostigmine (MESTINON) 60 mg tablet; 1/2 tab three times daily 61-year-old gentleman with ocular myasthenia clinically who did respond to an ice pack test very appropriately. The remainder of his exam is benign. There may be some subtle right triceps weakness but nothing convincing to suggest generalized myasthenia today. We had a long conversation about the disease in general.  At this juncture because he is minimally symptomatic I will start Mestinon 30 mg 3 times daily for now to see if we can control the symptoms with this medication exclusively.   He is diabetic and I would like to avoid steroids if possible but if he does start to show any generalization of symptoms such as weakness or shortness of breath or difficulty swallowing we are going to have to proceed with steroids immediately. I will await the MRI which is pending. CT chest ordered rule out thymoma. We will try to obtain the labs done last week. Questions answered in detail and I will see him at his next visit. I reviewed and decided to continue the current medications. This clinical note was dictated with an electronic dictation software that can make unintentional errors. If there are any questions, please contact me directly for clarification. Thank you for giving me the opportunity to assist in the care of Mr. Sindhu Gtz. If you have questions, please do not hesitate to contact me. Sincerely, [address] [facility], DO Neurologist 
B[facility] [employer] [facility] Dipl[employer]ate [place]

## 2024-10-28 RX ORDER — PYRIDOSTIGMINE BROMIDE 60 MG/1
TABLET ORAL
Qty: 135 TABLET | Refills: 0 | OUTPATIENT
Start: 2024-10-28

## 2024-11-07 DIAGNOSIS — G70.00 MYASTHENIA GRAVIS WITHOUT (ACUTE) EXACERBATION (HCC): ICD-10-CM

## 2024-11-07 RX ORDER — MYCOPHENOLATE MOFETIL 500 MG/1
TABLET ORAL
Qty: 120 TABLET | Refills: 2 | OUTPATIENT
Start: 2024-11-07

## 2024-11-08 NOTE — TELEPHONE ENCOUNTER
Called the Pt. In regards to the below message.  He has confirmed he can come in 11/13/2024. 1 pm        Not seen since 2023-needs updated labs and f/u prior to refills. Please see if he can be worked in sooner for this. ANEESH

## 2024-11-13 ENCOUNTER — OFFICE VISIT (OUTPATIENT)
Age: 74
End: 2024-11-13
Payer: MEDICARE

## 2024-11-13 VITALS
BODY MASS INDEX: 27.99 KG/M2 | HEIGHT: 65 IN | WEIGHT: 168 LBS | OXYGEN SATURATION: 97 % | SYSTOLIC BLOOD PRESSURE: 138 MMHG | DIASTOLIC BLOOD PRESSURE: 80 MMHG | HEART RATE: 106 BPM

## 2024-11-13 DIAGNOSIS — G70.01 MYASTHENIA GRAVIS WITH EXACERBATION (HCC): Primary | ICD-10-CM

## 2024-11-13 DIAGNOSIS — Z79.899 HIGH RISK MEDICATION USE: ICD-10-CM

## 2024-11-13 PROCEDURE — 1036F TOBACCO NON-USER: CPT | Performed by: PSYCHIATRY & NEUROLOGY

## 2024-11-13 PROCEDURE — 1159F MED LIST DOCD IN RCRD: CPT | Performed by: PSYCHIATRY & NEUROLOGY

## 2024-11-13 PROCEDURE — G8484 FLU IMMUNIZE NO ADMIN: HCPCS | Performed by: PSYCHIATRY & NEUROLOGY

## 2024-11-13 PROCEDURE — 1123F ACP DISCUSS/DSCN MKR DOCD: CPT | Performed by: PSYCHIATRY & NEUROLOGY

## 2024-11-13 PROCEDURE — 1126F AMNT PAIN NOTED NONE PRSNT: CPT | Performed by: PSYCHIATRY & NEUROLOGY

## 2024-11-13 PROCEDURE — G8417 CALC BMI ABV UP PARAM F/U: HCPCS | Performed by: PSYCHIATRY & NEUROLOGY

## 2024-11-13 PROCEDURE — G8427 DOCREV CUR MEDS BY ELIG CLIN: HCPCS | Performed by: PSYCHIATRY & NEUROLOGY

## 2024-11-13 PROCEDURE — 3017F COLORECTAL CA SCREEN DOC REV: CPT | Performed by: PSYCHIATRY & NEUROLOGY

## 2024-11-13 PROCEDURE — 99215 OFFICE O/P EST HI 40 MIN: CPT | Performed by: PSYCHIATRY & NEUROLOGY

## 2024-11-13 RX ORDER — PREDNISONE 20 MG/1
20 TABLET ORAL DAILY
Qty: 90 TABLET | Refills: 0 | Status: SHIPPED | OUTPATIENT
Start: 2024-11-13

## 2024-11-13 RX ORDER — PYRIDOSTIGMINE BROMIDE 60 MG/1
60 TABLET ORAL 3 TIMES DAILY
Qty: 270 TABLET | Refills: 1 | Status: SHIPPED | OUTPATIENT
Start: 2024-11-13

## 2024-11-13 RX ORDER — LISINOPRIL 10 MG/1
10 TABLET ORAL DAILY
COMMUNITY

## 2024-11-13 RX ORDER — MYCOPHENOLATE MOFETIL 500 MG/1
TABLET ORAL
Qty: 360 TABLET | Refills: 1 | Status: SHIPPED | OUTPATIENT
Start: 2024-11-13

## 2024-11-13 RX ORDER — METOPROLOL SUCCINATE 25 MG/1
25 TABLET, EXTENDED RELEASE ORAL DAILY
COMMUNITY

## 2024-11-13 NOTE — PROGRESS NOTES
Neurology Clinic Follow up Note    Patient ID:  Gregory Pace  [id number]  74 y.o.  1950      Mr. Pace is here for follow up today of  Chief Complaint   Patient presents with    OTHER     Pt returning for H/O MG Pt reports increased in blurry vision and droopy eye (Left)          Last Appointment With Me:  12/20/2023, former pt of Dr. Abarca    \"Gregory is here to follow-up.  He has antibody positive (B/B/B, anti-striatal positivity) myasthenia gravis   CT negative for thymoma.  At his last visit we maintain prednisone 20 mg as well as Mestinon and CellCept.  Diplopia is getting better and much less frequent.  He has a hard time remembering to take the midday Mestinon.  No new acute medical issues since I saw him last.  He is working full-time. Last time he received IVIG was early 2021 when he had Covid pneumonia.\"    Interval History:   Patient returning for delayed f/u of MG, last seen approximately 1 year ago.  He reports intermittent ptosis of the L eye and diplopia occurring over the past month.   He ran out of Cellcept- was off of this x 1.5 months or so and resumed 2 weeks ago.   MG symptoms were previously controlled prior to stopping the medication.   No generalized weakness, dysphagia, dysarthria. Admits to some SOB with exertion.   He is taking Cellcept 1000mg BID, mestinon 30mg TID. He has discontinued prednisone since last visit.   Denies side effects associated with medications.     PMHx/ PSHx/ FHx/ SHx:  Reviewed and unchanged previous visit.   Past Medical History:   Diagnosis Date    Anxiety disorder     Cancer (HCC)     PROSTATE; BCCA    COVID-19 01/01/2021    Diabetes (HCC)     type II    GERD (gastroesophageal reflux disease)     Hypertension     Neurological disorder        ROS:  Comprehensive review of systems negative except for as noted above.       Objective:       Meds:  Current Outpatient Medications   Medication Sig Dispense Refill    metoprolol succinate (TOPROL XL) 25 MG

## 2024-11-14 LAB
ALBUMIN SERPL-MCNC: 4.5 G/DL (ref 3.8–4.8)
ALP SERPL-CCNC: 73 IU/L (ref 44–121)
ALT SERPL-CCNC: 18 IU/L (ref 0–44)
AST SERPL-CCNC: 23 IU/L (ref 0–40)
BASOPHILS # BLD AUTO: 0.1 X10E3/UL (ref 0–0.2)
BASOPHILS NFR BLD AUTO: 1 %
BILIRUB SERPL-MCNC: 0.3 MG/DL (ref 0–1.2)
BUN SERPL-MCNC: 12 MG/DL (ref 8–27)
BUN/CREAT SERPL: 13 (ref 10–24)
CALCIUM SERPL-MCNC: 10.5 MG/DL (ref 8.6–10.2)
CHLORIDE SERPL-SCNC: 101 MMOL/L (ref 96–106)
CO2 SERPL-SCNC: 23 MMOL/L (ref 20–29)
CREAT SERPL-MCNC: 0.91 MG/DL (ref 0.76–1.27)
EGFRCR SERPLBLD CKD-EPI 2021: 89 ML/MIN/1.73
EOSINOPHIL # BLD AUTO: 0.6 X10E3/UL (ref 0–0.4)
EOSINOPHIL NFR BLD AUTO: 5 %
ERYTHROCYTE [DISTWIDTH] IN BLOOD BY AUTOMATED COUNT: 12.5 % (ref 11.6–15.4)
GLOBULIN SER CALC-MCNC: 2.9 G/DL (ref 1.5–4.5)
GLUCOSE SERPL-MCNC: 110 MG/DL (ref 70–99)
HCT VFR BLD AUTO: 51.8 % (ref 37.5–51)
HGB BLD-MCNC: 17.1 G/DL (ref 13–17.7)
IMM GRANULOCYTES # BLD AUTO: 0.1 X10E3/UL (ref 0–0.1)
IMM GRANULOCYTES NFR BLD AUTO: 1 %
LYMPHOCYTES # BLD AUTO: 2.1 X10E3/UL (ref 0.7–3.1)
LYMPHOCYTES NFR BLD AUTO: 19 %
MCH RBC QN AUTO: 30.5 PG (ref 26.6–33)
MCHC RBC AUTO-ENTMCNC: 33 G/DL (ref 31.5–35.7)
MCV RBC AUTO: 92 FL (ref 79–97)
MONOCYTES # BLD AUTO: 1.3 X10E3/UL (ref 0.1–0.9)
MONOCYTES NFR BLD AUTO: 12 %
NEUTROPHILS # BLD AUTO: 7 X10E3/UL (ref 1.4–7)
NEUTROPHILS NFR BLD AUTO: 62 %
PLATELET # BLD AUTO: 225 X10E3/UL (ref 150–450)
POTASSIUM SERPL-SCNC: 4.7 MMOL/L (ref 3.5–5.2)
PROT SERPL-MCNC: 7.4 G/DL (ref 6–8.5)
RBC # BLD AUTO: 5.61 X10E6/UL (ref 4.14–5.8)
SODIUM SERPL-SCNC: 139 MMOL/L (ref 134–144)
WBC # BLD AUTO: 11.1 X10E3/UL (ref 3.4–10.8)

## 2024-11-29 ENCOUNTER — TELEPHONE (OUTPATIENT)
Age: 74
End: 2024-11-29

## 2024-11-29 NOTE — TELEPHONE ENCOUNTER
Patient called to state his prescription for pyRIDostigmine to be filled at the [facility] on file is missing the dosage.    Please send in an updated script.

## 2024-11-29 NOTE — TELEPHONE ENCOUNTER
Called the Pharmacy and  confirmed the correct dose of Mestinon is 60 mg TID per last office visit note.     Called the Pt. And notified him.

## 2025-05-28 DIAGNOSIS — G70.01 MYASTHENIA GRAVIS WITH EXACERBATION (HCC): ICD-10-CM

## 2025-05-28 RX ORDER — PREDNISONE 20 MG/1
20 TABLET ORAL DAILY
Qty: 90 TABLET | Refills: 0 | OUTPATIENT
Start: 2025-05-28

## 2025-05-28 NOTE — TELEPHONE ENCOUNTER
Called the Pt. Offered 6/2/2025 can't refill prednisone without being seen. Had to leave a voice mail

## 2025-05-28 NOTE — TELEPHONE ENCOUNTER
Patient is calling to request a refill of predniSONE to be filled at the [facility] on file.

## 2025-06-02 ENCOUNTER — OFFICE VISIT (OUTPATIENT)
Age: 75
End: 2025-06-02
Payer: MEDICARE

## 2025-06-02 VITALS
WEIGHT: 170 LBS | DIASTOLIC BLOOD PRESSURE: 70 MMHG | SYSTOLIC BLOOD PRESSURE: 128 MMHG | BODY MASS INDEX: 28.32 KG/M2 | HEART RATE: 94 BPM | HEIGHT: 65 IN | OXYGEN SATURATION: 97 %

## 2025-06-02 DIAGNOSIS — Z79.899 HIGH RISK MEDICATION USE: ICD-10-CM

## 2025-06-02 DIAGNOSIS — G70.01 MYASTHENIA GRAVIS WITH EXACERBATION (HCC): Primary | ICD-10-CM

## 2025-06-02 PROCEDURE — 1125F AMNT PAIN NOTED PAIN PRSNT: CPT | Performed by: PSYCHIATRY & NEUROLOGY

## 2025-06-02 PROCEDURE — G8417 CALC BMI ABV UP PARAM F/U: HCPCS | Performed by: PSYCHIATRY & NEUROLOGY

## 2025-06-02 PROCEDURE — 1123F ACP DISCUSS/DSCN MKR DOCD: CPT | Performed by: PSYCHIATRY & NEUROLOGY

## 2025-06-02 PROCEDURE — G8427 DOCREV CUR MEDS BY ELIG CLIN: HCPCS | Performed by: PSYCHIATRY & NEUROLOGY

## 2025-06-02 PROCEDURE — 1036F TOBACCO NON-USER: CPT | Performed by: PSYCHIATRY & NEUROLOGY

## 2025-06-02 PROCEDURE — 99215 OFFICE O/P EST HI 40 MIN: CPT | Performed by: PSYCHIATRY & NEUROLOGY

## 2025-06-02 PROCEDURE — 1159F MED LIST DOCD IN RCRD: CPT | Performed by: PSYCHIATRY & NEUROLOGY

## 2025-06-02 PROCEDURE — 3017F COLORECTAL CA SCREEN DOC REV: CPT | Performed by: PSYCHIATRY & NEUROLOGY

## 2025-06-02 RX ORDER — PREDNISONE 10 MG/1
TABLET ORAL
Qty: 50 TABLET | Refills: 0 | Status: SHIPPED | OUTPATIENT
Start: 2025-06-02

## 2025-06-02 RX ORDER — MYCOPHENOLATE MOFETIL 500 MG/1
TABLET ORAL
Qty: 360 TABLET | Refills: 1 | Status: SHIPPED | OUTPATIENT
Start: 2025-06-02

## 2025-06-02 RX ORDER — PYRIDOSTIGMINE BROMIDE 60 MG/1
60 TABLET ORAL 3 TIMES DAILY
Qty: 270 TABLET | Refills: 1 | Status: SHIPPED | OUTPATIENT
Start: 2025-06-02

## 2025-06-02 NOTE — PROGRESS NOTES
Neurology Clinic Follow up Note    Patient ID:  Gregory Pace  [id number]  75 y.o.  1950      Mr. Pace is here for follow up today of  Chief Complaint   Patient presents with    OTHER     Pt returning for H/O MG Pt           Last Appointment With Me:  11/13/2024, former pt of Dr. Abarca    \"Gregory is here to follow-up.  He has antibody positive (B/B/B, anti-striatal positivity) myasthenia gravis   CT negative for thymoma.  At his last visit we maintain prednisone 20 mg as well as Mestinon and CellCept.  Diplopia is getting better and much less frequent.  He has a hard time remembering to take the midday Mestinon.  No new acute medical issues since I saw him last.  He is working full-time. Last time he received IVIG was early 2021 when he had Covid pneumonia.\"    Interval History:   Patient returning for delayed f/u of MG.   He reports PCP recently noted incidental lung mass, possible CA. Due for CT chest for further evaluation.   He feels L ptosis and diplopia are overall improved since his last visit.   No generalized weakness, dysphagia, dysarthria. Admits to significant exertional dyspnea.   He is taking Cellcept 1000mg BID, mestinon 60mg TID. He has ran out of prednisone over the past 30 days. Reports hip arthralgia is worse since this time.   Denies side effects associated with medications.     PMHx/ PSHx/ FHx/ SHx:  Reviewed and unchanged previous visit.   Past Medical History:   Diagnosis Date    Anxiety disorder     Cancer (HCC)     PROSTATE; BCCA    COVID-19 01/01/2021    Diabetes (HCC)     type II    GERD (gastroesophageal reflux disease)     Hypertension     Neurological disorder        ROS:  Comprehensive review of systems negative except for as noted above.       Objective:       Meds:  Current Outpatient Medications   Medication Sig Dispense Refill    metoprolol succinate (TOPROL XL) 25 MG extended release tablet Take 1 tablet by mouth daily      lisinopril (PRINIVIL;ZESTRIL) 10 MG tablet

## 2025-06-03 ENCOUNTER — RESULTS FOLLOW-UP (OUTPATIENT)
Age: 75
End: 2025-06-03

## 2025-06-03 LAB
ALBUMIN SERPL-MCNC: 4.3 G/DL (ref 3.8–4.8)
ALP SERPL-CCNC: 71 IU/L (ref 44–121)
ALT SERPL-CCNC: 14 IU/L (ref 0–44)
AST SERPL-CCNC: 17 IU/L (ref 0–40)
BASOPHILS # BLD AUTO: 0.1 X10E3/UL (ref 0–0.2)
BASOPHILS NFR BLD AUTO: 1 %
BILIRUB SERPL-MCNC: 0.3 MG/DL (ref 0–1.2)
BUN SERPL-MCNC: 11 MG/DL (ref 8–27)
BUN/CREAT SERPL: 14 (ref 10–24)
CALCIUM SERPL-MCNC: 9.5 MG/DL (ref 8.6–10.2)
CHLORIDE SERPL-SCNC: 102 MMOL/L (ref 96–106)
CO2 SERPL-SCNC: 20 MMOL/L (ref 20–29)
CREAT SERPL-MCNC: 0.8 MG/DL (ref 0.76–1.27)
EGFRCR SERPLBLD CKD-EPI 2021: 93 ML/MIN/1.73
EOSINOPHIL # BLD AUTO: 0.4 X10E3/UL (ref 0–0.4)
EOSINOPHIL NFR BLD AUTO: 3 %
ERYTHROCYTE [DISTWIDTH] IN BLOOD BY AUTOMATED COUNT: 12.1 % (ref 11.6–15.4)
GLOBULIN SER CALC-MCNC: 2.2 G/DL (ref 1.5–4.5)
GLUCOSE SERPL-MCNC: 165 MG/DL (ref 70–99)
HCT VFR BLD AUTO: 48.5 % (ref 37.5–51)
HGB BLD-MCNC: 15.9 G/DL (ref 13–17.7)
IMM GRANULOCYTES # BLD AUTO: 0 X10E3/UL (ref 0–0.1)
IMM GRANULOCYTES NFR BLD AUTO: 0 %
LYMPHOCYTES # BLD AUTO: 1.7 X10E3/UL (ref 0.7–3.1)
LYMPHOCYTES NFR BLD AUTO: 13 %
MCH RBC QN AUTO: 30.6 PG (ref 26.6–33)
MCHC RBC AUTO-ENTMCNC: 32.8 G/DL (ref 31.5–35.7)
MCV RBC AUTO: 93 FL (ref 79–97)
MONOCYTES # BLD AUTO: 1 X10E3/UL (ref 0.1–0.9)
MONOCYTES NFR BLD AUTO: 8 %
NEUTROPHILS # BLD AUTO: 9.5 X10E3/UL (ref 1.4–7)
NEUTROPHILS NFR BLD AUTO: 75 %
PLATELET # BLD AUTO: 129 X10E3/UL (ref 150–450)
POTASSIUM SERPL-SCNC: 4.5 MMOL/L (ref 3.5–5.2)
PROT SERPL-MCNC: 6.5 G/DL (ref 6–8.5)
RBC # BLD AUTO: 5.19 X10E6/UL (ref 4.14–5.8)
SODIUM SERPL-SCNC: 138 MMOL/L (ref 134–144)
WBC # BLD AUTO: 12.7 X10E3/UL (ref 3.4–10.8)

## 2025-06-03 NOTE — TELEPHONE ENCOUNTER
----- Message from Dr. Rachell Cheng DO sent at 6/3/2025 10:09 AM EDT -----  Platelets are low which is new on recent labs. Also noted mild elevation of white count and elevated glucose levels similar to prior testing-advise PCP f/u for ongoing monitoring. Please fax results to PCP. RMD  ----- Message -----  From: Michele, Bsrudy Incoming Amb Ref Lab Orders To [facility]  Sent: 6/3/2025   5:35 AM EDT  To: Rachell Cheng DO

## 2025-06-03 NOTE — TELEPHONE ENCOUNTER
Called and spoke to the Pt. Per Dr. Cheng:    Platelets are low which is new on recent labs. Also noted mild elevation of white count and elevated glucose levels similar to prior testing-advise PCP f/u for ongoing monitoring. Please fax results to PCP. RMD     He stated an understanding.     Faxed labs to PCP received confirmation it was sent successfully

## 2025-06-26 ENCOUNTER — TELEPHONE (OUTPATIENT)
Age: 75
End: 2025-06-26

## 2025-06-26 NOTE — TELEPHONE ENCOUNTER
*PENDING REVIEW*, Received referral request, referral is pending review from provider will contact patient once we are given the ok to schedule New Patient Tabitha.

## 2025-06-27 ENCOUNTER — TELEPHONE (OUTPATIENT)
Age: 75
End: 2025-06-27

## 2025-06-30 NOTE — PROGRESS NOTES
[facility]  [address] [facility], [address] [place] [id number]  W: [phone]  F: [phone]    Reason for Visit:   Gregory Pace is a 75 y.o. male who is seen in consultation at the request of Dr. Griffin for evaluation of Prostate cancer and Lung mass .    Treatment History:   2012-prostatectomy  2016-biochemical recurrence-radiation    History of Present Illness:   Patient is a 75-year-old male with diabetes who has a history of [place] 4+3 prostate cancer status post prostatectomy in 2012 with some extra prostatic extension.  He had biochemical recurrence in 20 16-20 17 when he had radiation.  His posttreatment PSA was 1.  He was lost to follow-up.  Recent PSA 6/2025 is noted to be 2.8.  This led to a PSMA PET scan which showed a questionable left upper lobe mass and some activity in the cerebral cortex.  He has been sent for further evaluation.    He has sob , he has Myasthenia gravis , sees Dr. Cheng. Has a cough. No HA.       Past Medical History:   Diagnosis Date    Anxiety disorder     Cancer (HCC)     PROSTATE; BCCA    COVID-19 01/01/2021    Diabetes (HCC)     type II    Elevated PSA     GERD (gastroesophageal reflux disease)     Hypertension     Lung mass     Neurological disorder       Past Surgical History:   Procedure Laterality Date    OTHER SURGICAL HISTORY Right     Ear Surgery post facial surgery for scar tissue removal    OTHER SURGICAL HISTORY      Horse Accident-surgery to face for fx    UROLOGICAL SURGERY        Social History     Tobacco Use    Smoking status: Never    Smokeless tobacco: Never   Substance Use Topics    Alcohol use: Yes      Family History   Problem Relation Age of Onset    Other Father     Cancer Brother         LUNG; THROAT    Anesth Problems Neg Hx      Current Outpatient Medications   Medication Sig    predniSONE (DELTASONE) 10 MG tablet Resume prednisone 20mg daily x 2 weeks, then 10mg daily x 2 weeks, then 5mg daily x 2 weeks,

## 2025-07-02 ENCOUNTER — CLINICAL DOCUMENTATION (OUTPATIENT)
Age: 75
End: 2025-07-02

## 2025-07-02 ENCOUNTER — INITIAL CONSULT (OUTPATIENT)
Age: 75
End: 2025-07-02
Payer: MEDICARE

## 2025-07-02 VITALS
WEIGHT: 169 LBS | BODY MASS INDEX: 28.12 KG/M2 | SYSTOLIC BLOOD PRESSURE: 159 MMHG | OXYGEN SATURATION: 94 % | TEMPERATURE: 98.3 F | RESPIRATION RATE: 16 BRPM | HEART RATE: 95 BPM | DIASTOLIC BLOOD PRESSURE: 75 MMHG

## 2025-07-02 DIAGNOSIS — R91.8 MASS OF LEFT LUNG: Primary | ICD-10-CM

## 2025-07-02 DIAGNOSIS — C61 MALIGNANT NEOPLASM OF PROSTATE (HCC): ICD-10-CM

## 2025-07-02 PROCEDURE — 1126F AMNT PAIN NOTED NONE PRSNT: CPT | Performed by: INTERNAL MEDICINE

## 2025-07-02 PROCEDURE — G8428 CUR MEDS NOT DOCUMENT: HCPCS | Performed by: INTERNAL MEDICINE

## 2025-07-02 PROCEDURE — G8417 CALC BMI ABV UP PARAM F/U: HCPCS | Performed by: INTERNAL MEDICINE

## 2025-07-02 PROCEDURE — 1123F ACP DISCUSS/DSCN MKR DOCD: CPT | Performed by: INTERNAL MEDICINE

## 2025-07-02 PROCEDURE — 99205 OFFICE O/P NEW HI 60 MIN: CPT | Performed by: INTERNAL MEDICINE

## 2025-07-02 PROCEDURE — 1036F TOBACCO NON-USER: CPT | Performed by: INTERNAL MEDICINE

## 2025-07-02 PROCEDURE — 3017F COLORECTAL CA SCREEN DOC REV: CPT | Performed by: INTERNAL MEDICINE

## 2025-07-02 ASSESSMENT — PATIENT HEALTH QUESTIONNAIRE - PHQ9
2. FEELING DOWN, DEPRESSED OR HOPELESS: NOT AT ALL
SUM OF ALL RESPONSES TO PHQ QUESTIONS 1-9: 0
1. LITTLE INTEREST OR PLEASURE IN DOING THINGS: NOT AT ALL
SUM OF ALL RESPONSES TO PHQ QUESTIONS 1-9: 0

## 2025-07-02 NOTE — PROGRESS NOTES
Gregory Pace is a 75 y.o. male    Chief Complaint   Patient presents with    New Patient     Prostate cancer and Lung mass       1. Have you been to the ER, urgent care clinic since your last visit?  Hospitalized since your last visit?No    2. Have you seen or consulted any other health care providers outside of the [facility] since your last visit?  Include any pap smears or colon screening. Yes, [facility]

## 2025-07-03 ENCOUNTER — TELEPHONE (OUTPATIENT)
Age: 75
End: 2025-07-03

## 2025-07-03 NOTE — PROGRESS NOTES
NCCN Distress Thermometer    Medical Oncology at [facility]    Date Screening Completed: 7/2/2025    Screening Declined:  [] Yes    Number that best describes how much distress you've experienced in the past week, including today?  0 [] - No distress 1 [x]      2 []      3 []      4 []       5 []       6 []      7 []      8 []      9 []       10 [] - Extreme distress    PROBLEM LIST  Have you had concerns about any of the items below in the past week, including today?      Physical Concerns Practical Concerns   [x] Pain [] Taking care of myself    [] Sleep [] Taking care of others    [x] Fatigue [] Safety   [] Tobacco use  [] Work   [] Substance use  [] School   [] Memory or concentration [] Housing/Utilities   [] Sexual health [x] Finances   [] Changes in eating  [] Insurance   [x] Loss or change of physical abilities  [] Transportation    []    Emotional Concerns [] Having enough food   [x] Worry or anxiety [] Access to medicine   [] Sadness or depression [] Treatment decisions   [] Loss of interest or enjoyment     [] Grief or loss  Spiritual or Amish Concerns   [] Fear [x] Sense of meaning or purpose   [] Loneliness  [] Changes in nemo or beliefs   [] Anger [x] Death, dying, or afterlife   [] Changes in appearance [] Conflict between beliefs and cancer treatments    [] Feelings of worthlessness or being a burden [] Relationship with the sacred    [] Ritual or dietary needs    Social Concerns     [] Relationship with spouse or partner     [] Relationship with children    [] Relationship with family members     [] Relationship with friends or coworkers     [] Communication with health care team     [] Ability to have children     [] Prejudice or discrimination        Other Concerns: n/a    Social Work Chart Review Note:   Reviewed chart to gather background information and determine follow-up.  Did not see patient at consult.  NCCN Distress score <5 does not indicate SW outreach.     Plan:

## 2025-07-03 NOTE — TELEPHONE ENCOUNTER
Called patient to schedule new patient ursula at our office, per patient he already has rheumatologist that he sees. Referral will be closed

## 2025-07-18 ENCOUNTER — TELEPHONE (OUTPATIENT)
Age: 75
End: 2025-07-18

## 2025-07-18 ENCOUNTER — HOSPITAL ENCOUNTER (OUTPATIENT)
Age: 75
Discharge: HOME | End: 2025-07-21
Payer: MEDICARE

## 2025-07-18 DIAGNOSIS — R91.8 MASS OF LEFT LUNG: ICD-10-CM

## 2025-07-18 PROCEDURE — A9579 GAD-BASE MR CONTRAST NOS,1ML: HCPCS | Performed by: RADIOLOGY

## 2025-07-18 PROCEDURE — 6360000004 HC RX CONTRAST MEDICATION: Performed by: RADIOLOGY

## 2025-07-18 PROCEDURE — 70553 MRI BRAIN STEM W/O & W/DYE: CPT

## 2025-07-18 RX ORDER — GADOTERIDOL 279.3 MG/ML
18 INJECTION INTRAVENOUS
Status: COMPLETED | OUTPATIENT
Start: 2025-07-18 | End: 2025-07-18

## 2025-07-18 RX ADMIN — GADOTERIDOL 18 ML: 279.3 INJECTION, SOLUTION INTRAVENOUS at 13:09

## 2025-07-18 NOTE — TELEPHONE ENCOUNTER
Pt called stating they're having a hard time trying to get his images from his biopsy from 6/18/25 sent over from [facility]; stated they told him to have us send a request.   CB#[phone]

## 2025-07-18 NOTE — TELEPHONE ENCOUNTER
Message sent to Crystal in radiology to see what images are needed and if a request has already been sent.

## 2025-07-24 DIAGNOSIS — R91.8 MASS OF LEFT LUNG: Primary | ICD-10-CM

## 2025-07-25 DIAGNOSIS — R91.8 MASS OF LEFT LUNG: ICD-10-CM

## 2025-07-26 LAB
APTT PPP: 27 SEC (ref 24–33)
BASOPHILS # BLD AUTO: 0.1 X10E3/UL (ref 0–0.2)
BASOPHILS NFR BLD AUTO: 1 %
EOSINOPHIL # BLD AUTO: 0.6 X10E3/UL (ref 0–0.4)
EOSINOPHIL NFR BLD AUTO: 6 %
ERYTHROCYTE [DISTWIDTH] IN BLOOD BY AUTOMATED COUNT: 12.9 % (ref 11.6–15.4)
HCT VFR BLD AUTO: 46.4 % (ref 37.5–51)
HGB BLD-MCNC: 14.6 G/DL (ref 13–17.7)
IMM GRANULOCYTES # BLD AUTO: 0 X10E3/UL (ref 0–0.1)
IMM GRANULOCYTES NFR BLD AUTO: 0 %
INR PPP: 1 (ref 0.9–1.2)
LYMPHOCYTES # BLD AUTO: 2 X10E3/UL (ref 0.7–3.1)
LYMPHOCYTES NFR BLD AUTO: 22 %
MCH RBC QN AUTO: 29.4 PG (ref 26.6–33)
MCHC RBC AUTO-ENTMCNC: 31.5 G/DL (ref 31.5–35.7)
MCV RBC AUTO: 93 FL (ref 79–97)
MONOCYTES # BLD AUTO: 0.9 X10E3/UL (ref 0.1–0.9)
MONOCYTES NFR BLD AUTO: 10 %
NEUTROPHILS # BLD AUTO: 5.6 X10E3/UL (ref 1.4–7)
NEUTROPHILS NFR BLD AUTO: 61 %
PLATELET # BLD AUTO: 369 X10E3/UL (ref 150–450)
PROTHROMBIN TIME: 10.7 SEC (ref 9.1–12)
RBC # BLD AUTO: 4.97 X10E6/UL (ref 4.14–5.8)
WBC # BLD AUTO: 9.3 X10E3/UL (ref 3.4–10.8)

## 2025-07-28 ENCOUNTER — HOSPITAL ENCOUNTER (OUTPATIENT)
Facility: HOSPITAL | Age: 75
Discharge: HOME | End: 2025-07-31
Attending: INTERNAL MEDICINE
Payer: MEDICARE

## 2025-07-28 VITALS
SYSTOLIC BLOOD PRESSURE: 124 MMHG | HEART RATE: 70 BPM | DIASTOLIC BLOOD PRESSURE: 61 MMHG | TEMPERATURE: 98.2 F | RESPIRATION RATE: 22 BRPM | OXYGEN SATURATION: 94 %

## 2025-07-28 DIAGNOSIS — R91.8 MASS OF LEFT LUNG: ICD-10-CM

## 2025-07-28 PROCEDURE — 71045 X-RAY EXAM CHEST 1 VIEW: CPT

## 2025-07-28 PROCEDURE — 88333 PATH CONSLTJ SURG CYTO XM 1: CPT

## 2025-07-28 PROCEDURE — 88341 IMHCHEM/IMCYTCHM EA ADD ANTB: CPT

## 2025-07-28 PROCEDURE — 6360000002 HC RX W HCPCS

## 2025-07-28 PROCEDURE — 88305 TISSUE EXAM BY PATHOLOGIST: CPT

## 2025-07-28 PROCEDURE — 88342 IMHCHEM/IMCYTCHM 1ST ANTB: CPT

## 2025-07-28 PROCEDURE — 2580000003 HC RX 258

## 2025-07-28 PROCEDURE — 99156 MOD SED OTH PHYS/QHP 5/>YRS: CPT

## 2025-07-28 PROCEDURE — 2709999900 CT BIOPSY LUNG/MEDIASTINUM PERC

## 2025-07-28 RX ORDER — MIDAZOLAM HYDROCHLORIDE 1 MG/ML
INJECTION, SOLUTION INTRAMUSCULAR; INTRAVENOUS PRN
Status: COMPLETED | OUTPATIENT
Start: 2025-07-28 | End: 2025-07-28

## 2025-07-28 RX ORDER — FENTANYL CITRATE 50 UG/ML
INJECTION, SOLUTION INTRAMUSCULAR; INTRAVENOUS PRN
Status: COMPLETED | OUTPATIENT
Start: 2025-07-28 | End: 2025-07-28

## 2025-07-28 RX ORDER — SODIUM CHLORIDE 9 MG/ML
INJECTION, SOLUTION INTRAVENOUS CONTINUOUS PRN
Status: COMPLETED | OUTPATIENT
Start: 2025-07-28 | End: 2025-07-28

## 2025-07-28 RX ADMIN — MIDAZOLAM 1 MG: 1 INJECTION INTRAMUSCULAR; INTRAVENOUS at 09:13

## 2025-07-28 RX ADMIN — MIDAZOLAM 1 MG: 1 INJECTION INTRAMUSCULAR; INTRAVENOUS at 09:22

## 2025-07-28 RX ADMIN — MIDAZOLAM 1 MG: 1 INJECTION INTRAMUSCULAR; INTRAVENOUS at 09:19

## 2025-07-28 RX ADMIN — FENTANYL CITRATE 50 MCG: 50 INJECTION INTRAMUSCULAR; INTRAVENOUS at 09:13

## 2025-07-28 RX ADMIN — SODIUM CHLORIDE 125 ML/HR: 9 INJECTION, SOLUTION INTRAVENOUS at 09:14

## 2025-07-28 RX ADMIN — FENTANYL CITRATE 50 MCG: 50 INJECTION INTRAMUSCULAR; INTRAVENOUS at 09:19

## 2025-07-28 ASSESSMENT — PAIN - FUNCTIONAL ASSESSMENT: PAIN_FUNCTIONAL_ASSESSMENT: 0-10

## 2025-07-28 NOTE — PROGRESS NOTES
Pt arrives ambulatory to angio department accompanied by wife for L lung bx procedure. All assessments completed and consent was reviewed.  Education given was regarding procedure, moderate sedation, post-procedure care and  management/follow-up. Opportunity for questions was provided and all questions and concerns were addressed.

## 2025-07-28 NOTE — H&P
INTERVENTIONAL RADIOLOGY  Preoperative History and Physical      Patient:  Gregory Pace  :  1950  Age:  75 y.o.  MRN:  [id number]  Today's Date:  2025      CC / HPI   Gregory Pace is a 75 y.o. male with a history of lung mass who presents for lung biopsy.      PAST MEDICAL HISTORY  Past Medical History:   Diagnosis Date    Anxiety disorder     Cancer (HCC)     PROSTATE; [facility]    COVID-19 2021    Diabetes (HCC)     type II    Elevated PSA     GERD (gastroesophageal reflux disease)     Hypertension     Lung mass     Neurological disorder        PAST SURGICAL HISTORY  Past Surgical History:   Procedure Laterality Date    OTHER SURGICAL HISTORY Right     Ear Surgery post facial surgery for scar tissue removal    OTHER SURGICAL HISTORY      Horse Accident-surgery to face for fx    UROLOGICAL SURGERY         SOCIAL HISTORY  Social History     Socioeconomic History    Marital status:      Spouse name: Not on file    Number of children: Not on file    Years of education: Not on file    Highest education level: Not on file   Occupational History    Not on file   Tobacco Use    Smoking status: Never    Smokeless tobacco: Never   Vaping Use    Vaping status: Never Used   Substance and Sexual Activity    Alcohol use: Yes    Drug use: No    Sexual activity: Not on file   Other Topics Concern    Not on file   Social History Narrative    Not on file     Social Drivers of Health     Financial Resource Strain: Not on file   Food Insecurity: Not on file   Transportation Needs: Not on file   Physical Activity: Not on file   Stress: Not on file   Social Connections: Not on file   Intimate Partner Violence: Not on file   Housing Stability: Not on file       FAMILY HISTORY  Family History   Problem Relation Age of Onset    Other Father     Cancer Brother         LUNG; THROAT    Anesth Problems Neg Hx        CURRENT MEDICATIONS  Current Outpatient Medications   Medication Sig Dispense Refill    predniSONE

## 2025-07-29 ENCOUNTER — TELEPHONE (OUTPATIENT)
Age: 75
End: 2025-07-29

## 2025-07-29 DIAGNOSIS — R91.8 MASS OF LEFT LUNG: Primary | ICD-10-CM

## 2025-07-29 DIAGNOSIS — C61 MALIGNANT NEOPLASM OF PROSTATE (HCC): ICD-10-CM

## 2025-07-29 NOTE — TELEPHONE ENCOUNTER
1413 HIPAA x2  Dr. 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 We are referring him urgently to Dr. Clovis Durant with neurosurgery.   If he has any new headaches, vision changes, imbalance would recommend he go to the ER.   Pt voiced understanding and will await call from their office for an appt.  Pt was appreciative of my call.

## 2025-07-30 ENCOUNTER — CLINICAL DOCUMENTATION (OUTPATIENT)
Age: 75
End: 2025-07-30

## 2025-08-02 ENCOUNTER — ANESTHESIA (OUTPATIENT)
Facility: HOSPITAL | Age: 75
End: 2025-08-02
Payer: MEDICARE

## 2025-08-02 ENCOUNTER — HOSPITAL ENCOUNTER (INPATIENT)
Facility: HOSPITAL | Age: 75
LOS: 3 days | Discharge: HOME | DRG: 853 | End: 2025-08-06
Attending: STUDENT IN AN ORGANIZED HEALTH CARE EDUCATION/TRAINING PROGRAM | Admitting: HOSPITALIST
Payer: MEDICARE

## 2025-08-02 ENCOUNTER — ANESTHESIA EVENT (OUTPATIENT)
Facility: HOSPITAL | Age: 75
End: 2025-08-02
Payer: MEDICARE

## 2025-08-02 ENCOUNTER — APPOINTMENT (OUTPATIENT)
Facility: HOSPITAL | Age: 75
DRG: 853 | End: 2025-08-02
Payer: MEDICARE

## 2025-08-02 DIAGNOSIS — J81.0 ACUTE PULMONARY EDEMA (HCC): ICD-10-CM

## 2025-08-02 DIAGNOSIS — R11.2 NAUSEA AND VOMITING, UNSPECIFIED VOMITING TYPE: Primary | ICD-10-CM

## 2025-08-02 DIAGNOSIS — N20.0 KIDNEY STONE: ICD-10-CM

## 2025-08-02 DIAGNOSIS — R91.8 MASS OF LEFT LUNG: ICD-10-CM

## 2025-08-02 DIAGNOSIS — N12 PYELONEPHRITIS: ICD-10-CM

## 2025-08-02 PROBLEM — N13.30 HYDRONEPHROSIS: Status: ACTIVE | Noted: 2025-08-02

## 2025-08-02 LAB
ACB COMPLEX DNA BLD POS QL NAA+NON-PROBE: NOT DETECTED
ACCESSION NUMBER, LLC1M: ABNORMAL
ALBUMIN SERPL-MCNC: 3.6 G/DL (ref 3.5–5.2)
ALBUMIN/GLOB SERPL: 1.4 (ref 1.1–2.2)
ALP SERPL-CCNC: 52 U/L (ref 40–129)
ALT SERPL-CCNC: 12 U/L (ref 10–35)
ANION GAP SERPL CALC-SCNC: 11 MMOL/L (ref 2–14)
APPEARANCE UR: CLEAR
AST SERPL-CCNC: 19 U/L (ref 10–50)
B FRAGILIS DNA BLD POS QL NAA+NON-PROBE: NOT DETECTED
BACTERIA URNS QL MICRO: ABNORMAL /HPF
BASOPHILS # BLD: 0 K/UL (ref 0–0.1)
BASOPHILS NFR BLD: 0 % (ref 0–1)
BILIRUB SERPL-MCNC: 0.6 MG/DL (ref 0–1.2)
BILIRUB UR QL: NEGATIVE
BIOFIRE TEST COMMENT: ABNORMAL
BLACTX-M ISLT/SPM QL: NOT DETECTED
BLAIMP ISLT/SPM QL: NOT DETECTED
BLAKPC ISLT/SPM QL: NOT DETECTED
BLAOXA-48-LIKE ISLT/SPM QL: NOT DETECTED
BLAVIM ISLT/SPM QL: NOT DETECTED
BUN SERPL-MCNC: 16 MG/DL (ref 8–23)
BUN/CREAT SERPL: 15 (ref 12–20)
C ALBICANS DNA BLD POS QL NAA+NON-PROBE: NOT DETECTED
C AURIS DNA BLD POS QL NAA+NON-PROBE: NOT DETECTED
C GATTII+NEOFOR DNA BLD POS QL NAA+N-PRB: NOT DETECTED
C GLABRATA DNA BLD POS QL NAA+NON-PROBE: NOT DETECTED
C KRUSEI DNA BLD POS QL NAA+NON-PROBE: NOT DETECTED
C PARAP DNA BLD POS QL NAA+NON-PROBE: NOT DETECTED
C TROPICLS DNA BLD POS QL NAA+NON-PROBE: NOT DETECTED
CALCIUM SERPL-MCNC: 9.3 MG/DL (ref 8.8–10.2)
CHLORIDE SERPL-SCNC: 103 MMOL/L (ref 98–107)
CO2 SERPL-SCNC: 22 MMOL/L (ref 20–29)
COLISTIN RES MCR-1 ISLT/SPM QL: NOT DETECTED
COLOR UR: ABNORMAL
COMMENT:: NORMAL
COMMENT:: NORMAL
CREAT SERPL-MCNC: 1.13 MG/DL (ref 0.7–1.2)
DIFFERENTIAL METHOD BLD: ABNORMAL
E CLOAC COMP DNA BLD POS NAA+NON-PROBE: NOT DETECTED
E COLI DNA BLD POS QL NAA+NON-PROBE: DETECTED
E FAECALIS DNA BLD POS QL NAA+NON-PROBE: NOT DETECTED
E FAECIUM DNA BLD POS QL NAA+NON-PROBE: NOT DETECTED
ENTEROBACTERALES DNA BLD POS NAA+N-PRB: DETECTED
EOSINOPHIL # BLD: 0 K/UL (ref 0–0.4)
EOSINOPHIL NFR BLD: 0 % (ref 0–7)
EPITH CASTS URNS QL MICRO: ABNORMAL /LPF
ERYTHROCYTE [DISTWIDTH] IN BLOOD BY AUTOMATED COUNT: 13.2 % (ref 11.5–14.5)
FLUAV RNA SPEC QL NAA+PROBE: NOT DETECTED
FLUBV RNA SPEC QL NAA+PROBE: NOT DETECTED
GLOBULIN SER CALC-MCNC: 2.6 G/DL (ref 2–4)
GLUCOSE BLD STRIP.AUTO-MCNC: 107 MG/DL (ref 65–117)
GLUCOSE SERPL-MCNC: 98 MG/DL (ref 65–100)
GLUCOSE UR STRIP.AUTO-MCNC: NEGATIVE MG/DL
GP B STREP DNA BLD POS QL NAA+NON-PROBE: NOT DETECTED
HAEM INFLU DNA BLD POS QL NAA+NON-PROBE: NOT DETECTED
HCT VFR BLD AUTO: 42.3 % (ref 36.6–50.3)
HGB BLD-MCNC: 14 G/DL (ref 12.1–17)
HGB UR QL STRIP: ABNORMAL
HYALINE CASTS URNS QL MICRO: ABNORMAL /LPF (ref 0–5)
IMM GRANULOCYTES # BLD AUTO: 0 K/UL
IMM GRANULOCYTES NFR BLD AUTO: 0 %
K OXYTOCA DNA BLD POS QL NAA+NON-PROBE: NOT DETECTED
KETONES UR QL STRIP.AUTO: ABNORMAL MG/DL
KLEBSIELLA SP DNA BLD POS QL NAA+NON-PRB: NOT DETECTED
KLEBSIELLA SP DNA BLD POS QL NAA+NON-PRB: NOT DETECTED
L MONOCYTOG DNA BLD POS QL NAA+NON-PROBE: NOT DETECTED
LACTATE SERPL-SCNC: 1.7 MMOL/L (ref 0.5–2)
LACTATE SERPL-SCNC: 1.9 MMOL/L (ref 0.5–2)
LEUKOCYTE ESTERASE UR QL STRIP.AUTO: ABNORMAL
LYMPHOCYTES # BLD: 0.24 K/UL (ref 0.8–3.5)
LYMPHOCYTES NFR BLD: 1 % (ref 12–49)
MCH RBC QN AUTO: 30.1 PG (ref 26–34)
MCHC RBC AUTO-ENTMCNC: 33.1 G/DL (ref 30–36.5)
MCV RBC AUTO: 91 FL (ref 80–99)
MONOCYTES # BLD: 1.95 K/UL (ref 0–1)
MONOCYTES NFR BLD: 8 % (ref 5–13)
N MEN DNA BLD POS QL NAA+NON-PROBE: NOT DETECTED
NEUTS BAND NFR BLD MANUAL: 8 % (ref 0–6)
NEUTS SEG # BLD: 22.21 K/UL (ref 1.8–8)
NEUTS SEG NFR BLD: 83 % (ref 32–75)
NITRITE UR QL STRIP.AUTO: NEGATIVE
NRBC # BLD: 0 K/UL (ref 0–0.01)
NRBC BLD-RTO: 0 PER 100 WBC
P AERUGINOSA DNA BLD POS NAA+NON-PROBE: NOT DETECTED
PH UR STRIP: 6 (ref 5–8)
PLATELET # BLD AUTO: 343 K/UL (ref 150–400)
PMV BLD AUTO: 9.7 FL (ref 8.9–12.9)
POTASSIUM SERPL-SCNC: 4.3 MMOL/L (ref 3.5–5.1)
PROT SERPL-MCNC: 6.3 G/DL (ref 6.4–8.3)
PROT UR STRIP-MCNC: 30 MG/DL
PROTEUS SP DNA BLD POS QL NAA+NON-PROBE: NOT DETECTED
RBC # BLD AUTO: 4.65 M/UL (ref 4.1–5.7)
RBC #/AREA URNS HPF: ABNORMAL /HPF (ref 0–5)
RBC MORPH BLD: ABNORMAL
RESISTANT GENE NDM BY PCR: NOT DETECTED
RESISTANT GENE TARGETS: ABNORMAL
S AUREUS DNA BLD POS QL NAA+NON-PROBE: NOT DETECTED
S AUREUS+CONS DNA BLD POS NAA+NON-PROBE: NOT DETECTED
S EPIDERMIDIS DNA BLD POS QL NAA+NON-PRB: NOT DETECTED
S LUGDUNENSIS DNA BLD POS QL NAA+NON-PRB: NOT DETECTED
S MALTOPHILIA DNA BLD POS QL NAA+NON-PRB: NOT DETECTED
S MARCESCENS DNA BLD POS NAA+NON-PROBE: NOT DETECTED
S PNEUM DNA BLD POS QL NAA+NON-PROBE: NOT DETECTED
S PYO DNA BLD POS QL NAA+NON-PROBE: NOT DETECTED
SALMONELLA DNA BLD POS QL NAA+NON-PROBE: NOT DETECTED
SARS-COV-2 RNA RESP QL NAA+PROBE: NOT DETECTED
SERVICE CMNT-IMP: NORMAL
SODIUM SERPL-SCNC: 136 MMOL/L (ref 136–145)
SOURCE: NORMAL
SP GR UR REFRACTOMETRY: 1.01 (ref 1–1.03)
SPECIMEN HOLD: NORMAL
SPECIMEN HOLD: NORMAL
STREPTOCOCCUS DNA BLD POS NAA+NON-PROBE: NOT DETECTED
TROPONIN T SERPL HS-MCNC: 23.8 NG/L (ref 0–22)
UROBILINOGEN UR QL STRIP.AUTO: 0.2 EU/DL (ref 0.2–1)
WBC # BLD AUTO: 24.4 K/UL (ref 4.1–11.1)
WBC URNS QL MICRO: >100 /HPF (ref 0–4)

## 2025-08-02 PROCEDURE — 93005 ELECTROCARDIOGRAM TRACING: CPT | Performed by: STUDENT IN AN ORGANIZED HEALTH CARE EDUCATION/TRAINING PROGRAM

## 2025-08-02 PROCEDURE — 99285 EMERGENCY DEPT VISIT HI MDM: CPT

## 2025-08-02 PROCEDURE — 3600000013 HC SURGERY LEVEL 3 ADDTL 15MIN: Performed by: UROLOGY

## 2025-08-02 PROCEDURE — 2500000003 HC RX 250 WO HCPCS: Performed by: NURSE ANESTHETIST, CERTIFIED REGISTERED

## 2025-08-02 PROCEDURE — 84484 ASSAY OF TROPONIN QUANT: CPT

## 2025-08-02 PROCEDURE — 0T778DZ DILATION OF LEFT URETER WITH INTRALUMINAL DEVICE, VIA NATURAL OR ARTIFICIAL OPENING ENDOSCOPIC: ICD-10-PCS | Performed by: UROLOGY

## 2025-08-02 PROCEDURE — 7100000001 HC PACU RECOVERY - ADDTL 15 MIN: Performed by: UROLOGY

## 2025-08-02 PROCEDURE — 74177 CT ABD & PELVIS W/CONTRAST: CPT

## 2025-08-02 PROCEDURE — 2500000003 HC RX 250 WO HCPCS: Performed by: HOSPITALIST

## 2025-08-02 PROCEDURE — 2700000000 HC OXYGEN THERAPY PER DAY

## 2025-08-02 PROCEDURE — 83605 ASSAY OF LACTIC ACID: CPT

## 2025-08-02 PROCEDURE — 81001 URINALYSIS AUTO W/SCOPE: CPT

## 2025-08-02 PROCEDURE — G0378 HOSPITAL OBSERVATION PER HR: HCPCS

## 2025-08-02 PROCEDURE — BT1F1ZZ FLUOROSCOPY OF LEFT KIDNEY, URETER AND BLADDER USING LOW OSMOLAR CONTRAST: ICD-10-PCS | Performed by: HOSPITALIST

## 2025-08-02 PROCEDURE — 6360000002 HC RX W HCPCS: Performed by: STUDENT IN AN ORGANIZED HEALTH CARE EDUCATION/TRAINING PROGRAM

## 2025-08-02 PROCEDURE — 96374 THER/PROPH/DIAG INJ IV PUSH: CPT

## 2025-08-02 PROCEDURE — 2500000003 HC RX 250 WO HCPCS: Performed by: STUDENT IN AN ORGANIZED HEALTH CARE EDUCATION/TRAINING PROGRAM

## 2025-08-02 PROCEDURE — 80053 COMPREHEN METABOLIC PANEL: CPT

## 2025-08-02 PROCEDURE — 3600000003 HC SURGERY LEVEL 3 BASE: Performed by: UROLOGY

## 2025-08-02 PROCEDURE — 87186 SC STD MICRODIL/AGAR DIL: CPT

## 2025-08-02 PROCEDURE — C1758 CATHETER, URETERAL: HCPCS | Performed by: UROLOGY

## 2025-08-02 PROCEDURE — 2580000003 HC RX 258: Performed by: UROLOGY

## 2025-08-02 PROCEDURE — 87154 CUL TYP ID BLD PTHGN 6+ TRGT: CPT

## 2025-08-02 PROCEDURE — 71045 X-RAY EXAM CHEST 1 VIEW: CPT

## 2025-08-02 PROCEDURE — 87636 SARSCOV2 & INF A&B AMP PRB: CPT

## 2025-08-02 PROCEDURE — 2580000003 HC RX 258: Performed by: NURSE PRACTITIONER

## 2025-08-02 PROCEDURE — 82962 GLUCOSE BLOOD TEST: CPT

## 2025-08-02 PROCEDURE — C2617 STENT, NON-COR, TEM W/O DEL: HCPCS | Performed by: UROLOGY

## 2025-08-02 PROCEDURE — 6360000002 HC RX W HCPCS: Performed by: HOSPITALIST

## 2025-08-02 PROCEDURE — 87088 URINE BACTERIA CULTURE: CPT

## 2025-08-02 PROCEDURE — BT1F1ZZ FLUOROSCOPY OF LEFT KIDNEY, URETER AND BLADDER USING LOW OSMOLAR CONTRAST: ICD-10-PCS | Performed by: UROLOGY

## 2025-08-02 PROCEDURE — 6360000004 HC RX CONTRAST MEDICATION: Performed by: RADIOLOGY

## 2025-08-02 PROCEDURE — 3700000000 HC ANESTHESIA ATTENDED CARE: Performed by: UROLOGY

## 2025-08-02 PROCEDURE — 6370000000 HC RX 637 (ALT 250 FOR IP): Performed by: NURSE PRACTITIONER

## 2025-08-02 PROCEDURE — 2709999900 HC NON-CHARGEABLE SUPPLY: Performed by: UROLOGY

## 2025-08-02 PROCEDURE — 6360000004 HC RX CONTRAST MEDICATION: Performed by: UROLOGY

## 2025-08-02 PROCEDURE — 3700000001 HC ADD 15 MINUTES (ANESTHESIA): Performed by: UROLOGY

## 2025-08-02 PROCEDURE — 7100000000 HC PACU RECOVERY - FIRST 15 MIN: Performed by: UROLOGY

## 2025-08-02 PROCEDURE — 85025 COMPLETE CBC W/AUTO DIFF WBC: CPT

## 2025-08-02 PROCEDURE — 6360000002 HC RX W HCPCS: Performed by: NURSE ANESTHETIST, CERTIFIED REGISTERED

## 2025-08-02 PROCEDURE — 0T778DZ DILATION OF LEFT URETER WITH INTRALUMINAL DEVICE, VIA NATURAL OR ARTIFICIAL OPENING ENDOSCOPIC: ICD-10-PCS | Performed by: HOSPITALIST

## 2025-08-02 PROCEDURE — 6370000000 HC RX 637 (ALT 250 FOR IP): Performed by: UROLOGY

## 2025-08-02 PROCEDURE — 87040 BLOOD CULTURE FOR BACTERIA: CPT

## 2025-08-02 PROCEDURE — 87086 URINE CULTURE/COLONY COUNT: CPT

## 2025-08-02 PROCEDURE — 94760 N-INVAS EAR/PLS OXIMETRY 1: CPT

## 2025-08-02 PROCEDURE — 87077 CULTURE AEROBIC IDENTIFY: CPT

## 2025-08-02 RX ORDER — LISINOPRIL 20 MG/1
20 TABLET ORAL DAILY
Status: DISCONTINUED | OUTPATIENT
Start: 2025-08-02 | End: 2025-08-06 | Stop reason: HOSPADM

## 2025-08-02 RX ORDER — ACETAMINOPHEN 325 MG/1
650 TABLET ORAL EVERY 6 HOURS PRN
Status: DISCONTINUED | OUTPATIENT
Start: 2025-08-02 | End: 2025-08-06 | Stop reason: HOSPADM

## 2025-08-02 RX ORDER — EZETIMIBE 10 MG/1
10 TABLET ORAL DAILY
Status: DISCONTINUED | OUTPATIENT
Start: 2025-08-02 | End: 2025-08-06 | Stop reason: HOSPADM

## 2025-08-02 RX ORDER — OXYCODONE HYDROCHLORIDE 5 MG/1
5 TABLET ORAL EVERY 6 HOURS PRN
Refills: 0 | Status: COMPLETED | OUTPATIENT
Start: 2025-08-02 | End: 2025-08-03

## 2025-08-02 RX ORDER — METOPROLOL SUCCINATE 50 MG/1
25 TABLET, EXTENDED RELEASE ORAL DAILY
Status: DISCONTINUED | OUTPATIENT
Start: 2025-08-02 | End: 2025-08-04

## 2025-08-02 RX ORDER — EPHEDRINE SULFATE 50 MG/ML
INJECTION INTRAVENOUS
Status: DISCONTINUED | OUTPATIENT
Start: 2025-08-02 | End: 2025-08-02 | Stop reason: SDUPTHER

## 2025-08-02 RX ORDER — PYRIDOSTIGMINE BROMIDE 60 MG/1
60 TABLET ORAL 3 TIMES DAILY
Status: DISCONTINUED | OUTPATIENT
Start: 2025-08-02 | End: 2025-08-06 | Stop reason: HOSPADM

## 2025-08-02 RX ORDER — ONDANSETRON 2 MG/ML
4 INJECTION INTRAMUSCULAR; INTRAVENOUS EVERY 6 HOURS PRN
Status: DISCONTINUED | OUTPATIENT
Start: 2025-08-02 | End: 2025-08-06 | Stop reason: HOSPADM

## 2025-08-02 RX ORDER — SODIUM CHLORIDE 0.9 % (FLUSH) 0.9 %
5-40 SYRINGE (ML) INJECTION EVERY 12 HOURS SCHEDULED
Status: DISCONTINUED | OUTPATIENT
Start: 2025-08-02 | End: 2025-08-06 | Stop reason: HOSPADM

## 2025-08-02 RX ORDER — MYCOPHENOLATE MOFETIL 250 MG/1
1000 CAPSULE ORAL 2 TIMES DAILY
Status: DISCONTINUED | OUTPATIENT
Start: 2025-08-02 | End: 2025-08-06 | Stop reason: HOSPADM

## 2025-08-02 RX ORDER — PROPOFOL 10 MG/ML
INJECTION, EMULSION INTRAVENOUS
Status: DISCONTINUED | OUTPATIENT
Start: 2025-08-02 | End: 2025-08-02 | Stop reason: SDUPTHER

## 2025-08-02 RX ORDER — MULTIVITAMIN WITH IRON
1 TABLET ORAL DAILY
Status: ON HOLD | COMMUNITY
End: 2025-08-06 | Stop reason: HOSPADM

## 2025-08-02 RX ORDER — SODIUM CHLORIDE 9 MG/ML
INJECTION, SOLUTION INTRAVENOUS CONTINUOUS
Status: DISPENSED | OUTPATIENT
Start: 2025-08-02 | End: 2025-08-03

## 2025-08-02 RX ORDER — ACETAMINOPHEN 650 MG/1
650 SUPPOSITORY RECTAL EVERY 6 HOURS PRN
Status: DISCONTINUED | OUTPATIENT
Start: 2025-08-02 | End: 2025-08-06 | Stop reason: HOSPADM

## 2025-08-02 RX ORDER — LIDOCAINE HYDROCHLORIDE 20 MG/ML
INJECTION, SOLUTION EPIDURAL; INFILTRATION; INTRACAUDAL; PERINEURAL
Status: DISCONTINUED | OUTPATIENT
Start: 2025-08-02 | End: 2025-08-02 | Stop reason: SDUPTHER

## 2025-08-02 RX ORDER — ONDANSETRON 4 MG/1
4 TABLET, ORALLY DISINTEGRATING ORAL EVERY 8 HOURS PRN
Status: DISCONTINUED | OUTPATIENT
Start: 2025-08-02 | End: 2025-08-06 | Stop reason: HOSPADM

## 2025-08-02 RX ORDER — ONDANSETRON 2 MG/ML
INJECTION INTRAMUSCULAR; INTRAVENOUS
Status: DISCONTINUED | OUTPATIENT
Start: 2025-08-02 | End: 2025-08-02 | Stop reason: SDUPTHER

## 2025-08-02 RX ORDER — IOPAMIDOL 755 MG/ML
100 INJECTION, SOLUTION INTRAVASCULAR
Status: COMPLETED | OUTPATIENT
Start: 2025-08-02 | End: 2025-08-02

## 2025-08-02 RX ORDER — CEFAZOLIN SODIUM 1 G/3ML
INJECTION, POWDER, FOR SOLUTION INTRAMUSCULAR; INTRAVENOUS
Status: DISCONTINUED | OUTPATIENT
Start: 2025-08-02 | End: 2025-08-02 | Stop reason: SDUPTHER

## 2025-08-02 RX ORDER — FENTANYL CITRATE 50 UG/ML
INJECTION, SOLUTION INTRAMUSCULAR; INTRAVENOUS
Status: DISCONTINUED | OUTPATIENT
Start: 2025-08-02 | End: 2025-08-02 | Stop reason: SDUPTHER

## 2025-08-02 RX ORDER — PHENYLEPHRINE HCL IN 0.9% NACL 0.4MG/10ML
SYRINGE (ML) INTRAVENOUS
Status: DISCONTINUED | OUTPATIENT
Start: 2025-08-02 | End: 2025-08-02 | Stop reason: SDUPTHER

## 2025-08-02 RX ORDER — TRAZODONE HYDROCHLORIDE 50 MG/1
50 TABLET ORAL NIGHTLY
Status: DISCONTINUED | OUTPATIENT
Start: 2025-08-02 | End: 2025-08-06 | Stop reason: HOSPADM

## 2025-08-02 RX ORDER — MULTIVITAMIN WITH IRON
1 TABLET ORAL DAILY
Status: DISCONTINUED | OUTPATIENT
Start: 2025-08-02 | End: 2025-08-06 | Stop reason: HOSPADM

## 2025-08-02 RX ORDER — DEXAMETHASONE SODIUM PHOSPHATE 4 MG/ML
INJECTION, SOLUTION INTRA-ARTICULAR; INTRALESIONAL; INTRAMUSCULAR; INTRAVENOUS; SOFT TISSUE
Status: DISCONTINUED | OUTPATIENT
Start: 2025-08-02 | End: 2025-08-02 | Stop reason: SDUPTHER

## 2025-08-02 RX ORDER — SODIUM CHLORIDE 0.9 % (FLUSH) 0.9 %
5-40 SYRINGE (ML) INJECTION PRN
Status: DISCONTINUED | OUTPATIENT
Start: 2025-08-02 | End: 2025-08-06 | Stop reason: HOSPADM

## 2025-08-02 RX ORDER — IOPAMIDOL 755 MG/ML
INJECTION, SOLUTION INTRAVASCULAR PRN
Status: DISCONTINUED | OUTPATIENT
Start: 2025-08-02 | End: 2025-08-02 | Stop reason: HOSPADM

## 2025-08-02 RX ORDER — POLYETHYLENE GLYCOL 3350 17 G/17G
17 POWDER, FOR SOLUTION ORAL DAILY PRN
Status: DISCONTINUED | OUTPATIENT
Start: 2025-08-02 | End: 2025-08-06 | Stop reason: HOSPADM

## 2025-08-02 RX ORDER — SODIUM CHLORIDE 9 MG/ML
INJECTION, SOLUTION INTRAVENOUS PRN
Status: DISCONTINUED | OUTPATIENT
Start: 2025-08-02 | End: 2025-08-06 | Stop reason: HOSPADM

## 2025-08-02 RX ORDER — ROSUVASTATIN CALCIUM 10 MG/1
20 TABLET, COATED ORAL DAILY
Status: DISCONTINUED | OUTPATIENT
Start: 2025-08-02 | End: 2025-08-06 | Stop reason: HOSPADM

## 2025-08-02 RX ORDER — AMLODIPINE BESYLATE 5 MG/1
10 TABLET ORAL DAILY
Status: DISCONTINUED | OUTPATIENT
Start: 2025-08-02 | End: 2025-08-06 | Stop reason: HOSPADM

## 2025-08-02 RX ADMIN — OXYCODONE 5 MG: 5 TABLET ORAL at 20:37

## 2025-08-02 RX ADMIN — WATER 1000 MG: 1 INJECTION INTRAMUSCULAR; INTRAVENOUS; SUBCUTANEOUS at 22:20

## 2025-08-02 RX ADMIN — EPHEDRINE SULFATE 5 MG: 50 INJECTION INTRAVENOUS at 10:54

## 2025-08-02 RX ADMIN — WATER 1000 MG: 1 INJECTION INTRAMUSCULAR; INTRAVENOUS; SUBCUTANEOUS at 07:41

## 2025-08-02 RX ADMIN — PYRIDOSTIGMINE BROMIDE 60 MG: 60 TABLET ORAL at 20:37

## 2025-08-02 RX ADMIN — ONDANSETRON 4 MG: 2 INJECTION, SOLUTION INTRAMUSCULAR; INTRAVENOUS at 11:13

## 2025-08-02 RX ADMIN — PYRIDOSTIGMINE BROMIDE 60 MG: 60 TABLET ORAL at 15:18

## 2025-08-02 RX ADMIN — Medication 80 MCG: at 10:39

## 2025-08-02 RX ADMIN — TRAZODONE HYDROCHLORIDE 50 MG: 50 TABLET ORAL at 20:37

## 2025-08-02 RX ADMIN — ACETAMINOPHEN 650 MG: 325 TABLET ORAL at 18:10

## 2025-08-02 RX ADMIN — PROPOFOL 25 MG: 10 INJECTION, EMULSION INTRAVENOUS at 10:40

## 2025-08-02 RX ADMIN — IOPAMIDOL 100 ML: 755 INJECTION, SOLUTION INTRAVENOUS at 05:24

## 2025-08-02 RX ADMIN — PROPOFOL 100 MG: 10 INJECTION, EMULSION INTRAVENOUS at 10:39

## 2025-08-02 RX ADMIN — FENTANYL CITRATE 50 MCG: 50 INJECTION INTRAMUSCULAR; INTRAVENOUS at 10:30

## 2025-08-02 RX ADMIN — DEXAMETHASONE SODIUM PHOSPHATE 4 MG: 4 INJECTION INTRA-ARTICULAR; INTRALESIONAL; INTRAMUSCULAR; INTRAVENOUS; SOFT TISSUE at 11:13

## 2025-08-02 RX ADMIN — SODIUM CHLORIDE: 0.9 INJECTION, SOLUTION INTRAVENOUS at 08:39

## 2025-08-02 RX ADMIN — LIDOCAINE HYDROCHLORIDE 120 MG: 20 INJECTION, SOLUTION EPIDURAL; INFILTRATION; INTRACAUDAL; PERINEURAL at 10:44

## 2025-08-02 RX ADMIN — SODIUM CHLORIDE: 0.9 INJECTION, SOLUTION INTRAVENOUS at 12:00

## 2025-08-02 RX ADMIN — CEFAZOLIN 2 G: 1 INJECTION, POWDER, FOR SOLUTION INTRAMUSCULAR; INTRAVENOUS at 10:45

## 2025-08-02 RX ADMIN — LIDOCAINE HYDROCHLORIDE 100 MG: 20 INJECTION, SOLUTION EPIDURAL; INFILTRATION; INTRACAUDAL; PERINEURAL at 10:39

## 2025-08-02 RX ADMIN — FENTANYL CITRATE 50 MCG: 50 INJECTION INTRAMUSCULAR; INTRAVENOUS at 10:47

## 2025-08-02 RX ADMIN — LIDOCAINE HYDROCHLORIDE 80 MG: 20 INJECTION, SOLUTION EPIDURAL; INFILTRATION; INTRACAUDAL; PERINEURAL at 10:41

## 2025-08-02 RX ADMIN — EPHEDRINE SULFATE 10 MG: 50 INJECTION INTRAVENOUS at 10:45

## 2025-08-02 ASSESSMENT — PAIN DESCRIPTION - LOCATION
LOCATION: PENIS;PERINEUM
LOCATION: PENIS

## 2025-08-02 ASSESSMENT — PAIN SCALES - GENERAL
PAINLEVEL_OUTOF10: 6
PAINLEVEL_OUTOF10: 7
PAINLEVEL_OUTOF10: 0

## 2025-08-02 ASSESSMENT — PAIN DESCRIPTION - ORIENTATION: ORIENTATION: ANTERIOR

## 2025-08-02 ASSESSMENT — LIFESTYLE VARIABLES
HOW MANY STANDARD DRINKS CONTAINING ALCOHOL DO YOU HAVE ON A TYPICAL DAY: PATIENT DOES NOT DRINK
HOW OFTEN DO YOU HAVE A DRINK CONTAINING ALCOHOL: NEVER

## 2025-08-02 ASSESSMENT — PAIN DESCRIPTION - DESCRIPTORS
DESCRIPTORS: ACHING
DESCRIPTORS: STABBING

## 2025-08-02 ASSESSMENT — PAIN - FUNCTIONAL ASSESSMENT: PAIN_FUNCTIONAL_ASSESSMENT: NONE - DENIES PAIN

## 2025-08-02 NOTE — ANESTHESIA PRE PROCEDURE
Department of Anesthesiology  Preprocedure Note       Name:  Gregory Pace   Age:  75 y.o.  :  1950                                          MRN:  [id number]         Date:  2025      Surgeon: Surgeon(s):  Les Roth MD    Procedure: Procedure(s):  CYSTOSCOPY.LEFT URETERAL STENT INSERTION. LEFT URETERAL RETROGRADE    Medications prior to admission:   Prior to Admission medications    Medication Sig Start Date End Date Taking? Authorizing Provider   predniSONE (DELTASONE) 10 MG tablet Resume prednisone 20mg daily x 2 weeks, then 10mg daily x 2 weeks, then 5mg daily x 2 weeks, then discontinue 25   Rachell Cheng,    mycophenolate (CELLCEPT) 500 MG tablet TAKE 2 TABLETS TWICE DAILY 25   Rachell Cheng,    pyRIDostigmine (MESTINON) 60 MG tablet Take 1 tablet by mouth 3 times daily 25   Rachell Cheng,    metoprolol succinate (TOPROL XL) 25 MG extended release tablet Take 1 tablet by mouth daily    Provider, MD Gayle   lisinopril (PRINIVIL;ZESTRIL) 20 MG tablet Take 1 tablet by mouth daily    Provider, MD Gayle   amLODIPine (NORVASC) 10 MG tablet Take 1 tablet by mouth daily 21   Automatic Reconciliation, Ar   ezetimibe (ZETIA) 10 MG tablet Take 1 tablet by mouth daily    Automatic Reconciliation, Ar   glipiZIDE (GLUCOTROL XL) 5 MG extended release tablet Take 1 tablet by mouth daily    Automatic Reconciliation, Ar   rosuvastatin (CRESTOR) 20 MG tablet Take 1 tablet by mouth daily 20   Automatic Reconciliation, Ar   traZODone (DESYREL) 50 MG tablet Take 1 tablet by mouth nightly 20   Automatic Reconciliation, Ar       Current medications:    No current facility-administered medications for this encounter.     Current Outpatient Medications   Medication Sig Dispense Refill   • predniSONE (DELTASONE) 10 MG tablet Resume prednisone 20mg daily x 2 weeks, then 10mg daily x 2 weeks, then 5mg daily x 2 weeks, then discontinue 50 tablet 0   •

## 2025-08-02 NOTE — ANESTHESIA POSTPROCEDURE EVALUATION
Post-Anesthesia Evaluation and Assessment    Patient: Gregory Pace MRN: [id number]  SSN: [id number]    YOB: 1950  Age: 75 y.o.  Sex: male      I have evaluated the patient and they are stable and ready for discharge from the PACU.     Cardiovascular Function/Vital Signs  Visit Vitals  BP (!) 124/59   Pulse 95   Temp 98.9 °F (37.2 °C) (Axillary)   Resp (!) 32   Ht 1.651 m (5' 5\")   Wt 79.7 kg (175 lb 12.8 oz)   SpO2 99%   BMI 29.25 kg/m²       Patient is status post General anesthesia for Procedure(s):  CYSTOSCOPY.LEFT URETERAL STENT INSERTION. LEFT URETERAL RETROGRADE.    Nausea/Vomiting: None    Postoperative hydration reviewed and adequate.    Pain:  Managed    Neurological Status:   At baseline    Mental Status, Level of Consciousness: Alert and  oriented to person, place, and time    Pulmonary Status:   Adequate oxygenation and airway patent    Complications related to anesthesia: None    Post-anesthesia assessment completed. No concerns    Signed By: Roxanne Aguilar DO     August 2, 2025

## 2025-08-03 ENCOUNTER — APPOINTMENT (OUTPATIENT)
Facility: HOSPITAL | Age: 75
DRG: 853 | End: 2025-08-03
Payer: MEDICARE

## 2025-08-03 PROBLEM — R78.81 BACTEREMIA: Status: ACTIVE | Noted: 2025-08-03

## 2025-08-03 LAB
ANION GAP SERPL CALC-SCNC: 10 MMOL/L (ref 2–14)
BASOPHILS # BLD: 0 K/UL (ref 0–0.1)
BASOPHILS NFR BLD: 0 % (ref 0–1)
BUN SERPL-MCNC: 21 MG/DL (ref 8–23)
BUN/CREAT SERPL: 19 (ref 12–20)
CALCIUM SERPL-MCNC: 8.6 MG/DL (ref 8.8–10.2)
CHLORIDE SERPL-SCNC: 103 MMOL/L (ref 98–107)
CO2 SERPL-SCNC: 21 MMOL/L (ref 20–29)
CREAT SERPL-MCNC: 1.1 MG/DL (ref 0.7–1.2)
DIFFERENTIAL METHOD BLD: ABNORMAL
EKG ATRIAL RATE: 87 BPM
EKG DIAGNOSIS: NORMAL
EKG P AXIS: 66 DEGREES
EKG P-R INTERVAL: 158 MS
EKG Q-T INTERVAL: 354 MS
EKG QRS DURATION: 84 MS
EKG QTC CALCULATION (BAZETT): 425 MS
EKG R AXIS: 61 DEGREES
EKG T AXIS: 48 DEGREES
EKG VENTRICULAR RATE: 87 BPM
EOSINOPHIL # BLD: 0 K/UL (ref 0–0.4)
EOSINOPHIL NFR BLD: 0 % (ref 0–7)
ERYTHROCYTE [DISTWIDTH] IN BLOOD BY AUTOMATED COUNT: 13.3 % (ref 11.5–14.5)
GLUCOSE SERPL-MCNC: 217 MG/DL (ref 65–100)
HCT VFR BLD AUTO: 37 % (ref 36.6–50.3)
HGB BLD-MCNC: 12.1 G/DL (ref 12.1–17)
IMM GRANULOCYTES # BLD AUTO: 0 K/UL
IMM GRANULOCYTES NFR BLD AUTO: 0 %
LYMPHOCYTES # BLD: 0.47 K/UL (ref 0.8–3.5)
LYMPHOCYTES NFR BLD: 2 % (ref 12–49)
MCH RBC QN AUTO: 30.5 PG (ref 26–34)
MCHC RBC AUTO-ENTMCNC: 32.7 G/DL (ref 30–36.5)
MCV RBC AUTO: 93.2 FL (ref 80–99)
MONOCYTES # BLD: 1.42 K/UL (ref 0–1)
MONOCYTES NFR BLD: 6 % (ref 5–13)
NEUTS BAND NFR BLD MANUAL: 4 % (ref 0–6)
NEUTS SEG # BLD: 21.71 K/UL (ref 1.8–8)
NEUTS SEG NFR BLD: 88 % (ref 32–75)
NRBC # BLD: 0 K/UL (ref 0–0.01)
NRBC BLD-RTO: 0 PER 100 WBC
PLATELET # BLD AUTO: 267 K/UL (ref 150–400)
PMV BLD AUTO: 9.4 FL (ref 8.9–12.9)
POTASSIUM SERPL-SCNC: 4.8 MMOL/L (ref 3.5–5.1)
RBC # BLD AUTO: 3.97 M/UL (ref 4.1–5.7)
RBC MORPH BLD: ABNORMAL
SODIUM SERPL-SCNC: 135 MMOL/L (ref 136–145)
WBC # BLD AUTO: 23.6 K/UL (ref 4.1–11.1)

## 2025-08-03 PROCEDURE — 6370000000 HC RX 637 (ALT 250 FOR IP): Performed by: NURSE PRACTITIONER

## 2025-08-03 PROCEDURE — 6370000000 HC RX 637 (ALT 250 FOR IP): Performed by: UROLOGY

## 2025-08-03 PROCEDURE — 96375 TX/PRO/DX INJ NEW DRUG ADDON: CPT

## 2025-08-03 PROCEDURE — 6360000002 HC RX W HCPCS: Performed by: UROLOGY

## 2025-08-03 PROCEDURE — G0378 HOSPITAL OBSERVATION PER HR: HCPCS

## 2025-08-03 PROCEDURE — 2580000003 HC RX 258: Performed by: HOSPITALIST

## 2025-08-03 PROCEDURE — 2500000003 HC RX 250 WO HCPCS: Performed by: HOSPITALIST

## 2025-08-03 PROCEDURE — 96376 TX/PRO/DX INJ SAME DRUG ADON: CPT

## 2025-08-03 PROCEDURE — 6360000002 HC RX W HCPCS: Performed by: HOSPITALIST

## 2025-08-03 PROCEDURE — 85025 COMPLETE CBC W/AUTO DIFF WBC: CPT

## 2025-08-03 PROCEDURE — 1100000000 HC RM PRIVATE

## 2025-08-03 PROCEDURE — 80048 BASIC METABOLIC PNL TOTAL CA: CPT

## 2025-08-03 PROCEDURE — 87040 BLOOD CULTURE FOR BACTERIA: CPT

## 2025-08-03 PROCEDURE — 2500000003 HC RX 250 WO HCPCS: Performed by: UROLOGY

## 2025-08-03 RX ORDER — IPRATROPIUM BROMIDE AND ALBUTEROL SULFATE 2.5; .5 MG/3ML; MG/3ML
1 SOLUTION RESPIRATORY (INHALATION) EVERY 4 HOURS PRN
Status: DISCONTINUED | OUTPATIENT
Start: 2025-08-03 | End: 2025-08-04

## 2025-08-03 RX ORDER — SODIUM CHLORIDE 9 MG/ML
INJECTION, SOLUTION INTRAVENOUS CONTINUOUS
Status: DISCONTINUED | OUTPATIENT
Start: 2025-08-03 | End: 2025-08-03

## 2025-08-03 RX ORDER — FUROSEMIDE 10 MG/ML
40 INJECTION INTRAMUSCULAR; INTRAVENOUS ONCE
Status: COMPLETED | OUTPATIENT
Start: 2025-08-03 | End: 2025-08-03

## 2025-08-03 RX ORDER — SODIUM CHLORIDE 9 MG/ML
INJECTION, SOLUTION INTRAVENOUS CONTINUOUS
Status: DISCONTINUED | OUTPATIENT
Start: 2025-08-03 | End: 2025-08-06 | Stop reason: HOSPADM

## 2025-08-03 RX ADMIN — PYRIDOSTIGMINE BROMIDE 60 MG: 60 TABLET ORAL at 14:54

## 2025-08-03 RX ADMIN — TRAZODONE HYDROCHLORIDE 50 MG: 50 TABLET ORAL at 22:35

## 2025-08-03 RX ADMIN — FUROSEMIDE 40 MG: 10 INJECTION, SOLUTION INTRAMUSCULAR; INTRAVENOUS at 16:06

## 2025-08-03 RX ADMIN — SODIUM CHLORIDE 2000 MG: 9 INJECTION INTRAMUSCULAR; INTRAVENOUS; SUBCUTANEOUS at 14:54

## 2025-08-03 RX ADMIN — SODIUM CHLORIDE: 0.9 INJECTION, SOLUTION INTRAVENOUS at 11:17

## 2025-08-03 RX ADMIN — EZETIMIBE 10 MG: 10 TABLET ORAL at 08:50

## 2025-08-03 RX ADMIN — IPRATROPIUM BROMIDE AND ALBUTEROL SULFATE 1 DOSE: .5; 3 SOLUTION RESPIRATORY (INHALATION) at 22:59

## 2025-08-03 RX ADMIN — SODIUM CHLORIDE, PRESERVATIVE FREE 10 ML: 5 INJECTION INTRAVENOUS at 08:50

## 2025-08-03 RX ADMIN — MYCOPHENOLATE MOFETIL 1000 MG: 250 CAPSULE ORAL at 08:49

## 2025-08-03 RX ADMIN — METOPROLOL SUCCINATE 25 MG: 50 TABLET, EXTENDED RELEASE ORAL at 08:49

## 2025-08-03 RX ADMIN — PYRIDOSTIGMINE BROMIDE 60 MG: 60 TABLET ORAL at 22:35

## 2025-08-03 RX ADMIN — PYRIDOSTIGMINE BROMIDE 60 MG: 60 TABLET ORAL at 11:17

## 2025-08-03 RX ADMIN — OXYCODONE 5 MG: 5 TABLET ORAL at 19:06

## 2025-08-03 RX ADMIN — ROSUVASTATIN 20 MG: 10 TABLET, FILM COATED ORAL at 08:50

## 2025-08-03 RX ADMIN — MYCOPHENOLATE MOFETIL 1000 MG: 250 CAPSULE ORAL at 22:35

## 2025-08-03 RX ADMIN — THERA TABS 1 TABLET: TAB at 08:49

## 2025-08-03 RX ADMIN — OXYCODONE 5 MG: 5 TABLET ORAL at 04:11

## 2025-08-03 RX ADMIN — ACETAMINOPHEN 650 MG: 325 TABLET ORAL at 22:34

## 2025-08-03 ASSESSMENT — PAIN SCALES - GENERAL
PAINLEVEL_OUTOF10: 5
PAINLEVEL_OUTOF10: 7

## 2025-08-03 ASSESSMENT — PAIN DESCRIPTION - ORIENTATION: ORIENTATION: ANTERIOR

## 2025-08-03 ASSESSMENT — PAIN DESCRIPTION - DESCRIPTORS
DESCRIPTORS: ACHING
DESCRIPTORS: BURNING

## 2025-08-03 ASSESSMENT — PAIN DESCRIPTION - LOCATION
LOCATION: CHEST
LOCATION: ABDOMEN

## 2025-08-04 ENCOUNTER — APPOINTMENT (OUTPATIENT)
Facility: HOSPITAL | Age: 75
DRG: 853 | End: 2025-08-04
Payer: MEDICARE

## 2025-08-04 PROBLEM — A41.51 SEPSIS DUE TO ESCHERICHIA COLI WITH ACUTE RENAL FAILURE WITHOUT SEPTIC SHOCK (HCC): Status: ACTIVE | Noted: 2025-08-04

## 2025-08-04 PROBLEM — N11.1 OBSTRUCTIVE PYELONEPHRITIS: Status: ACTIVE | Noted: 2025-08-04

## 2025-08-04 PROBLEM — R65.20 SEPSIS DUE TO ESCHERICHIA COLI WITH ACUTE RENAL FAILURE WITHOUT SEPTIC SHOCK (HCC): Status: ACTIVE | Noted: 2025-08-04

## 2025-08-04 PROBLEM — N12 PYELONEPHRITIS: Status: ACTIVE | Noted: 2025-08-04

## 2025-08-04 PROBLEM — N17.9 SEPSIS DUE TO ESCHERICHIA COLI WITH ACUTE RENAL FAILURE WITHOUT SEPTIC SHOCK (HCC): Status: ACTIVE | Noted: 2025-08-04

## 2025-08-04 LAB
ALBUMIN SERPL-MCNC: 3.3 G/DL (ref 3.5–5.2)
ALBUMIN/GLOB SERPL: 1.5 (ref 1.1–2.2)
ALP SERPL-CCNC: 65 U/L (ref 40–129)
ALT SERPL-CCNC: 12 U/L (ref 10–35)
ANION GAP SERPL CALC-SCNC: 13 MMOL/L (ref 2–14)
ARTERIAL PATENCY WRIST A: POSITIVE
AST SERPL-CCNC: 22 U/L (ref 10–50)
BACTERIA SPEC CULT: ABNORMAL
BASE EXCESS BLD CALC-SCNC: 0.3 MMOL/L
BDY SITE: ABNORMAL
BILIRUB SERPL-MCNC: 0.4 MG/DL (ref 0–1.2)
BUN SERPL-MCNC: 23 MG/DL (ref 8–23)
BUN/CREAT SERPL: 21 (ref 12–20)
CALCIUM SERPL-MCNC: 8.9 MG/DL (ref 8.8–10.2)
CC UR VC: ABNORMAL
CHLORIDE SERPL-SCNC: 98 MMOL/L (ref 98–107)
CO2 SERPL-SCNC: 23 MMOL/L (ref 20–29)
CREAT SERPL-MCNC: 1.11 MG/DL (ref 0.7–1.2)
ERYTHROCYTE [DISTWIDTH] IN BLOOD BY AUTOMATED COUNT: 13.2 % (ref 11.5–14.5)
GAS FLOW.O2 O2 DELIVERY SYS: ABNORMAL
GLOBULIN SER CALC-MCNC: 2.2 G/DL (ref 2–4)
GLUCOSE SERPL-MCNC: 154 MG/DL (ref 65–100)
HCO3 BLD-SCNC: 22.8 MMOL/L (ref 21–28)
HCT VFR BLD AUTO: 39.4 % (ref 36.6–50.3)
HGB BLD-MCNC: 12.9 G/DL (ref 12.1–17)
MAGNESIUM SERPL-MCNC: 2 MG/DL (ref 1.6–2.4)
MCH RBC QN AUTO: 30 PG (ref 26–34)
MCHC RBC AUTO-ENTMCNC: 32.7 G/DL (ref 30–36.5)
MCV RBC AUTO: 91.6 FL (ref 80–99)
NRBC # BLD: 0 K/UL (ref 0–0.01)
NRBC BLD-RTO: 0 PER 100 WBC
NT PRO BNP: 1730 PG/ML (ref 0–125)
O2/TOTAL GAS SETTING VFR VENT: 2 %
PCO2 BLD: 30.4 MMHG (ref 35–48)
PH BLD: 7.49 (ref 7.35–7.45)
PLATELET # BLD AUTO: 272 K/UL (ref 150–400)
PMV BLD AUTO: 10 FL (ref 8.9–12.9)
PO2 BLD: 71 MMHG (ref 83–108)
POTASSIUM SERPL-SCNC: 4.4 MMOL/L (ref 3.5–5.1)
PROT SERPL-MCNC: 5.5 G/DL (ref 6.4–8.3)
RBC # BLD AUTO: 4.3 M/UL (ref 4.1–5.7)
SAO2 % BLD: 95.5 % (ref 92–97)
SERVICE CMNT-IMP: ABNORMAL
SODIUM SERPL-SCNC: 134 MMOL/L (ref 136–145)
SPECIMEN TYPE: ABNORMAL
TROPONIN T SERPL HS-MCNC: 29.5 NG/L (ref 0–22)
WBC # BLD AUTO: 15.3 K/UL (ref 4.1–11.1)

## 2025-08-04 PROCEDURE — 6370000000 HC RX 637 (ALT 250 FOR IP): Performed by: NURSE PRACTITIONER

## 2025-08-04 PROCEDURE — 84484 ASSAY OF TROPONIN QUANT: CPT

## 2025-08-04 PROCEDURE — 82803 BLOOD GASES ANY COMBINATION: CPT

## 2025-08-04 PROCEDURE — 94760 N-INVAS EAR/PLS OXIMETRY 1: CPT

## 2025-08-04 PROCEDURE — 71045 X-RAY EXAM CHEST 1 VIEW: CPT

## 2025-08-04 PROCEDURE — 6360000002 HC RX W HCPCS: Performed by: UROLOGY

## 2025-08-04 PROCEDURE — 93005 ELECTROCARDIOGRAM TRACING: CPT

## 2025-08-04 PROCEDURE — 80053 COMPREHEN METABOLIC PANEL: CPT

## 2025-08-04 PROCEDURE — 2060000000 HC ICU INTERMEDIATE R&B

## 2025-08-04 PROCEDURE — 99223 1ST HOSP IP/OBS HIGH 75: CPT | Performed by: STUDENT IN AN ORGANIZED HEALTH CARE EDUCATION/TRAINING PROGRAM

## 2025-08-04 PROCEDURE — 83880 ASSAY OF NATRIURETIC PEPTIDE: CPT

## 2025-08-04 PROCEDURE — 6360000002 HC RX W HCPCS: Performed by: HOSPITALIST

## 2025-08-04 PROCEDURE — 6370000000 HC RX 637 (ALT 250 FOR IP): Performed by: UROLOGY

## 2025-08-04 PROCEDURE — 6370000000 HC RX 637 (ALT 250 FOR IP): Performed by: HOSPITALIST

## 2025-08-04 PROCEDURE — 2500000003 HC RX 250 WO HCPCS: Performed by: STUDENT IN AN ORGANIZED HEALTH CARE EDUCATION/TRAINING PROGRAM

## 2025-08-04 PROCEDURE — 71275 CT ANGIOGRAPHY CHEST: CPT

## 2025-08-04 PROCEDURE — G0545 PR INHERENT VISIT TO INPT: HCPCS | Performed by: STUDENT IN AN ORGANIZED HEALTH CARE EDUCATION/TRAINING PROGRAM

## 2025-08-04 PROCEDURE — 83735 ASSAY OF MAGNESIUM: CPT

## 2025-08-04 PROCEDURE — 94640 AIRWAY INHALATION TREATMENT: CPT

## 2025-08-04 PROCEDURE — 85027 COMPLETE CBC AUTOMATED: CPT

## 2025-08-04 PROCEDURE — 6360000004 HC RX CONTRAST MEDICATION: Performed by: RADIOLOGY

## 2025-08-04 PROCEDURE — 2500000003 HC RX 250 WO HCPCS: Performed by: UROLOGY

## 2025-08-04 PROCEDURE — 36600 WITHDRAWAL OF ARTERIAL BLOOD: CPT

## 2025-08-04 PROCEDURE — 6360000002 HC RX W HCPCS: Performed by: STUDENT IN AN ORGANIZED HEALTH CARE EDUCATION/TRAINING PROGRAM

## 2025-08-04 RX ORDER — OXYCODONE HYDROCHLORIDE 5 MG/1
5 TABLET ORAL EVERY 6 HOURS PRN
Refills: 0 | Status: DISCONTINUED | OUTPATIENT
Start: 2025-08-04 | End: 2025-08-06 | Stop reason: HOSPADM

## 2025-08-04 RX ORDER — HYDROXYZINE HYDROCHLORIDE 25 MG/1
25 TABLET, FILM COATED ORAL 3 TIMES DAILY PRN
Status: DISCONTINUED | OUTPATIENT
Start: 2025-08-04 | End: 2025-08-06 | Stop reason: HOSPADM

## 2025-08-04 RX ORDER — ARFORMOTEROL TARTRATE 15 UG/2ML
15 SOLUTION RESPIRATORY (INHALATION)
Status: DISCONTINUED | OUTPATIENT
Start: 2025-08-04 | End: 2025-08-06 | Stop reason: HOSPADM

## 2025-08-04 RX ORDER — IPRATROPIUM BROMIDE AND ALBUTEROL SULFATE 2.5; .5 MG/3ML; MG/3ML
1 SOLUTION RESPIRATORY (INHALATION) EVERY 6 HOURS PRN
Status: DISCONTINUED | OUTPATIENT
Start: 2025-08-04 | End: 2025-08-06 | Stop reason: HOSPADM

## 2025-08-04 RX ORDER — BUDESONIDE 0.5 MG/2ML
0.5 INHALANT ORAL
Status: DISCONTINUED | OUTPATIENT
Start: 2025-08-04 | End: 2025-08-06 | Stop reason: HOSPADM

## 2025-08-04 RX ORDER — METOPROLOL SUCCINATE 50 MG/1
50 TABLET, EXTENDED RELEASE ORAL DAILY
Status: DISCONTINUED | OUTPATIENT
Start: 2025-08-05 | End: 2025-08-06 | Stop reason: HOSPADM

## 2025-08-04 RX ORDER — BENZONATATE 100 MG/1
200 CAPSULE ORAL 3 TIMES DAILY PRN
Status: DISCONTINUED | OUTPATIENT
Start: 2025-08-04 | End: 2025-08-06 | Stop reason: HOSPADM

## 2025-08-04 RX ORDER — TAMSULOSIN HYDROCHLORIDE 0.4 MG/1
0.4 CAPSULE ORAL DAILY
Status: DISCONTINUED | OUTPATIENT
Start: 2025-08-04 | End: 2025-08-06 | Stop reason: HOSPADM

## 2025-08-04 RX ORDER — IOPAMIDOL 755 MG/ML
100 INJECTION, SOLUTION INTRAVASCULAR
Status: COMPLETED | OUTPATIENT
Start: 2025-08-04 | End: 2025-08-04

## 2025-08-04 RX ORDER — FUROSEMIDE 10 MG/ML
40 INJECTION INTRAMUSCULAR; INTRAVENOUS ONCE
Status: COMPLETED | OUTPATIENT
Start: 2025-08-04 | End: 2025-08-04

## 2025-08-04 RX ORDER — DEXAMETHASONE 0.5 MG/1
2 TABLET ORAL EVERY 12 HOURS SCHEDULED
Status: DISCONTINUED | OUTPATIENT
Start: 2025-08-04 | End: 2025-08-06 | Stop reason: HOSPADM

## 2025-08-04 RX ADMIN — MYCOPHENOLATE MOFETIL 1000 MG: 250 CAPSULE ORAL at 09:51

## 2025-08-04 RX ADMIN — METOPROLOL SUCCINATE 25 MG: 50 TABLET, EXTENDED RELEASE ORAL at 09:49

## 2025-08-04 RX ADMIN — OXYCODONE 5 MG: 5 TABLET ORAL at 21:25

## 2025-08-04 RX ADMIN — FUROSEMIDE 40 MG: 10 INJECTION, SOLUTION INTRAMUSCULAR; INTRAVENOUS at 17:40

## 2025-08-04 RX ADMIN — PYRIDOSTIGMINE BROMIDE 60 MG: 60 TABLET ORAL at 14:50

## 2025-08-04 RX ADMIN — AMLODIPINE BESYLATE 10 MG: 5 TABLET ORAL at 09:49

## 2025-08-04 RX ADMIN — IPRATROPIUM BROMIDE AND ALBUTEROL SULFATE 1 DOSE: .5; 3 SOLUTION RESPIRATORY (INHALATION) at 14:59

## 2025-08-04 RX ADMIN — IOPAMIDOL 80 ML: 755 INJECTION, SOLUTION INTRAVENOUS at 18:03

## 2025-08-04 RX ADMIN — BUDESONIDE 500 MCG: 0.5 INHALANT RESPIRATORY (INHALATION) at 21:27

## 2025-08-04 RX ADMIN — SODIUM CHLORIDE 2000 MG: 9 INJECTION INTRAMUSCULAR; INTRAVENOUS; SUBCUTANEOUS at 14:50

## 2025-08-04 RX ADMIN — EZETIMIBE 10 MG: 10 TABLET ORAL at 09:49

## 2025-08-04 RX ADMIN — TAMSULOSIN HYDROCHLORIDE 0.4 MG: 0.4 CAPSULE ORAL at 16:25

## 2025-08-04 RX ADMIN — SODIUM CHLORIDE, PRESERVATIVE FREE 10 ML: 5 INJECTION INTRAVENOUS at 21:26

## 2025-08-04 RX ADMIN — PYRIDOSTIGMINE BROMIDE 60 MG: 60 TABLET ORAL at 09:51

## 2025-08-04 RX ADMIN — DEXAMETHASONE 2 MG: 0.5 TABLET ORAL at 16:26

## 2025-08-04 RX ADMIN — IPRATROPIUM BROMIDE AND ALBUTEROL SULFATE 1 DOSE: .5; 3 SOLUTION RESPIRATORY (INHALATION) at 05:44

## 2025-08-04 RX ADMIN — THERA TABS 1 TABLET: TAB at 09:50

## 2025-08-04 RX ADMIN — HYDROXYZINE HYDROCHLORIDE 25 MG: 25 TABLET ORAL at 14:50

## 2025-08-04 RX ADMIN — ARFORMOTEROL TARTRATE 15 MCG: 15 SOLUTION RESPIRATORY (INHALATION) at 21:27

## 2025-08-04 RX ADMIN — MYCOPHENOLATE MOFETIL 1000 MG: 250 CAPSULE ORAL at 21:25

## 2025-08-04 RX ADMIN — ROSUVASTATIN 20 MG: 10 TABLET, FILM COATED ORAL at 09:49

## 2025-08-04 RX ADMIN — DEXAMETHASONE 2 MG: 0.5 TABLET ORAL at 21:25

## 2025-08-04 RX ADMIN — PYRIDOSTIGMINE BROMIDE 60 MG: 60 TABLET ORAL at 21:28

## 2025-08-04 RX ADMIN — ACETAMINOPHEN 650 MG: 325 TABLET ORAL at 09:50

## 2025-08-04 ASSESSMENT — PAIN SCALES - GENERAL
PAINLEVEL_OUTOF10: 0
PAINLEVEL_OUTOF10: 0
PAINLEVEL_OUTOF10: 4
PAINLEVEL_OUTOF10: 0
PAINLEVEL_OUTOF10: 1

## 2025-08-04 ASSESSMENT — PAIN DESCRIPTION - LOCATION: LOCATION: HEAD

## 2025-08-04 ASSESSMENT — PAIN DESCRIPTION - DESCRIPTORS
DESCRIPTORS: ACHING;DISCOMFORT
DESCRIPTORS: ACHING

## 2025-08-04 ASSESSMENT — ENCOUNTER SYMPTOMS: SHORTNESS OF BREATH: 1

## 2025-08-04 ASSESSMENT — PAIN DESCRIPTION - ORIENTATION: ORIENTATION: MID

## 2025-08-04 ASSESSMENT — PAIN - FUNCTIONAL ASSESSMENT: PAIN_FUNCTIONAL_ASSESSMENT: ACTIVITIES ARE NOT PREVENTED

## 2025-08-05 ENCOUNTER — TELEPHONE (OUTPATIENT)
Age: 75
End: 2025-08-05

## 2025-08-05 ENCOUNTER — APPOINTMENT (OUTPATIENT)
Facility: HOSPITAL | Age: 75
DRG: 853 | End: 2025-08-05
Attending: INTERNAL MEDICINE
Payer: MEDICARE

## 2025-08-05 DIAGNOSIS — C79.40 MALIGNANT NEOPLASM OF PROSTATE METASTATIC TO CENTRAL NERVOUS SYSTEM (HCC): Primary | ICD-10-CM

## 2025-08-05 DIAGNOSIS — C61 MALIGNANT NEOPLASM OF PROSTATE METASTATIC TO CENTRAL NERVOUS SYSTEM (HCC): Primary | ICD-10-CM

## 2025-08-05 PROBLEM — R11.2 NAUSEA AND VOMITING: Status: ACTIVE | Noted: 2025-08-05

## 2025-08-05 PROBLEM — R06.02 SHORTNESS OF BREATH: Status: ACTIVE | Noted: 2025-08-05

## 2025-08-05 PROBLEM — G93.89 BRAIN MASS: Status: ACTIVE | Noted: 2025-08-05

## 2025-08-05 LAB
ALBUMIN SERPL-MCNC: 3.4 G/DL (ref 3.5–5.2)
ALBUMIN/GLOB SERPL: 1.3 (ref 1.1–2.2)
ALP SERPL-CCNC: 66 U/L (ref 40–129)
ALT SERPL-CCNC: 21 U/L (ref 10–50)
ANION GAP SERPL CALC-SCNC: 16 MMOL/L (ref 2–14)
AST SERPL-CCNC: 32 U/L (ref 10–50)
BILIRUB SERPL-MCNC: 0.4 MG/DL (ref 0–1.2)
BUN SERPL-MCNC: 25 MG/DL (ref 8–23)
BUN/CREAT SERPL: 26 (ref 12–20)
CALCIUM SERPL-MCNC: 9.4 MG/DL (ref 8.8–10.2)
CHLORIDE SERPL-SCNC: 94 MMOL/L (ref 98–107)
CO2 SERPL-SCNC: 22 MMOL/L (ref 20–29)
CREAT SERPL-MCNC: 0.97 MG/DL (ref 0.7–1.2)
ECHO AO ROOT DIAM: 2.9 CM
ECHO AO ROOT INDEX: 1.58 CM/M2
ECHO AV AREA PEAK VELOCITY: 2.3 CM2
ECHO AV AREA/BSA PEAK VELOCITY: 1.3 CM2/M2
ECHO AV PEAK GRADIENT: 9 MMHG
ECHO AV PEAK VELOCITY: 1.5 M/S
ECHO AV VELOCITY RATIO: 0.8
ECHO BSA: 1.86 M2
ECHO EST RA PRESSURE: 3 MMHG
ECHO LA DIAMETER INDEX: 1.58 CM/M2
ECHO LA DIAMETER: 2.9 CM
ECHO LA TO AORTIC ROOT RATIO: 1
ECHO LV E' LATERAL VELOCITY: 10.75 CM/S
ECHO LV E' SEPTAL VELOCITY: 7.23 CM/S
ECHO LV EF PHYSICIAN: 60 %
ECHO LV FRACTIONAL SHORTENING: 36 % (ref 28–44)
ECHO LV INTERNAL DIMENSION DIASTOLE INDEX: 2.3 CM/M2
ECHO LV INTERNAL DIMENSION DIASTOLIC: 4.2 CM (ref 4.2–5.9)
ECHO LV INTERNAL DIMENSION SYSTOLIC INDEX: 1.48 CM/M2
ECHO LV INTERNAL DIMENSION SYSTOLIC: 2.7 CM
ECHO LV IVSD: 1 CM (ref 0.6–1)
ECHO LV MASS 2D: 147 G (ref 88–224)
ECHO LV MASS INDEX 2D: 80.3 G/M2 (ref 49–115)
ECHO LV POSTERIOR WALL DIASTOLIC: 1.1 CM (ref 0.6–1)
ECHO LV RELATIVE WALL THICKNESS RATIO: 0.52
ECHO LVOT AREA: 2.8 CM2
ECHO LVOT DIAM: 1.9 CM
ECHO LVOT PEAK GRADIENT: 6 MMHG
ECHO LVOT PEAK VELOCITY: 1.2 M/S
ECHO MV A VELOCITY: 0.87 M/S
ECHO MV AREA PHT: 4.5 CM2
ECHO MV E DECELERATION TIME (DT): 170.2 MS
ECHO MV E VELOCITY: 0.64 M/S
ECHO MV E/A RATIO: 0.74
ECHO MV E/E' LATERAL: 5.95
ECHO MV E/E' RATIO (AVERAGED): 7.4
ECHO MV E/E' SEPTAL: 8.85
ECHO MV PRESSURE HALF TIME (PHT): 49.4 MS
ECHO PV MAX VELOCITY: 0.9 M/S
ECHO PV PEAK GRADIENT: 3 MMHG
ECHO RIGHT VENTRICULAR SYSTOLIC PRESSURE (RVSP): 30 MMHG
ECHO RV FREE WALL PEAK S': 20 CM/S
ECHO RV TAPSE: 2 CM (ref 1.7–?)
ECHO TV REGURGITANT MAX VELOCITY: 2.6 M/S
ECHO TV REGURGITANT PEAK GRADIENT: 27 MMHG
EKG ATRIAL RATE: 109 BPM
EKG DIAGNOSIS: NORMAL
EKG P AXIS: 70 DEGREES
EKG P-R INTERVAL: 136 MS
EKG Q-T INTERVAL: 316 MS
EKG QRS DURATION: 92 MS
EKG QTC CALCULATION (BAZETT): 425 MS
EKG R AXIS: 57 DEGREES
EKG T AXIS: 52 DEGREES
EKG VENTRICULAR RATE: 109 BPM
ERYTHROCYTE [DISTWIDTH] IN BLOOD BY AUTOMATED COUNT: 13 % (ref 11.5–14.5)
GLOBULIN SER CALC-MCNC: 2.7 G/DL (ref 2–4)
GLUCOSE SERPL-MCNC: 200 MG/DL (ref 65–100)
HCT VFR BLD AUTO: 41.7 % (ref 36.6–50.3)
HGB BLD-MCNC: 14 G/DL (ref 12.1–17)
MCH RBC QN AUTO: 29.9 PG (ref 26–34)
MCHC RBC AUTO-ENTMCNC: 33.6 G/DL (ref 30–36.5)
MCV RBC AUTO: 89.1 FL (ref 80–99)
NRBC # BLD: 0 K/UL (ref 0–0.01)
NRBC BLD-RTO: 0 PER 100 WBC
PLATELET # BLD AUTO: 251 K/UL (ref 150–400)
PMV BLD AUTO: 10.2 FL (ref 8.9–12.9)
POTASSIUM SERPL-SCNC: 4.7 MMOL/L (ref 3.5–5.1)
PROT SERPL-MCNC: 6.1 G/DL (ref 6.4–8.3)
RBC # BLD AUTO: 4.68 M/UL (ref 4.1–5.7)
SODIUM SERPL-SCNC: 132 MMOL/L (ref 136–145)
WBC # BLD AUTO: 8.4 K/UL (ref 4.1–11.1)

## 2025-08-05 PROCEDURE — 80053 COMPREHEN METABOLIC PANEL: CPT

## 2025-08-05 PROCEDURE — 6360000002 HC RX W HCPCS: Performed by: HOSPITALIST

## 2025-08-05 PROCEDURE — 85027 COMPLETE CBC AUTOMATED: CPT

## 2025-08-05 PROCEDURE — 93306 TTE W/DOPPLER COMPLETE: CPT | Performed by: SPECIALIST

## 2025-08-05 PROCEDURE — 99223 1ST HOSP IP/OBS HIGH 75: CPT | Performed by: STUDENT IN AN ORGANIZED HEALTH CARE EDUCATION/TRAINING PROGRAM

## 2025-08-05 PROCEDURE — 6360000002 HC RX W HCPCS: Performed by: STUDENT IN AN ORGANIZED HEALTH CARE EDUCATION/TRAINING PROGRAM

## 2025-08-05 PROCEDURE — 6370000000 HC RX 637 (ALT 250 FOR IP): Performed by: HOSPITALIST

## 2025-08-05 PROCEDURE — 2500000003 HC RX 250 WO HCPCS: Performed by: UROLOGY

## 2025-08-05 PROCEDURE — 2500000003 HC RX 250 WO HCPCS: Performed by: STUDENT IN AN ORGANIZED HEALTH CARE EDUCATION/TRAINING PROGRAM

## 2025-08-05 PROCEDURE — G0545 PR INHERENT VISIT TO INPT: HCPCS | Performed by: STUDENT IN AN ORGANIZED HEALTH CARE EDUCATION/TRAINING PROGRAM

## 2025-08-05 PROCEDURE — 6370000000 HC RX 637 (ALT 250 FOR IP): Performed by: UROLOGY

## 2025-08-05 PROCEDURE — 94760 N-INVAS EAR/PLS OXIMETRY 1: CPT

## 2025-08-05 PROCEDURE — 93306 TTE W/DOPPLER COMPLETE: CPT

## 2025-08-05 PROCEDURE — 99223 1ST HOSP IP/OBS HIGH 75: CPT | Performed by: INTERNAL MEDICINE

## 2025-08-05 PROCEDURE — 94640 AIRWAY INHALATION TREATMENT: CPT

## 2025-08-05 PROCEDURE — 93010 ELECTROCARDIOGRAM REPORT: CPT | Performed by: SPECIALIST

## 2025-08-05 PROCEDURE — 2060000000 HC ICU INTERMEDIATE R&B

## 2025-08-05 PROCEDURE — 6360000002 HC RX W HCPCS: Performed by: UROLOGY

## 2025-08-05 PROCEDURE — 99233 SBSQ HOSP IP/OBS HIGH 50: CPT | Performed by: STUDENT IN AN ORGANIZED HEALTH CARE EDUCATION/TRAINING PROGRAM

## 2025-08-05 PROCEDURE — APPNB30 APP NON BILLABLE TIME 0-30 MINS: Performed by: NURSE PRACTITIONER

## 2025-08-05 PROCEDURE — 6370000000 HC RX 637 (ALT 250 FOR IP): Performed by: PHYSICIAN ASSISTANT

## 2025-08-05 PROCEDURE — 6360000002 HC RX W HCPCS

## 2025-08-05 RX ORDER — PANTOPRAZOLE SODIUM 40 MG/1
40 TABLET, DELAYED RELEASE ORAL
Status: DISCONTINUED | OUTPATIENT
Start: 2025-08-05 | End: 2025-08-06 | Stop reason: HOSPADM

## 2025-08-05 RX ORDER — ACETAMINOPHEN 325 MG/1
650 TABLET ORAL
Status: CANCELLED | OUTPATIENT
Start: 2025-09-03

## 2025-08-05 RX ORDER — EPINEPHRINE 1 MG/ML
0.3 INJECTION, SOLUTION, CONCENTRATE INTRAVENOUS PRN
OUTPATIENT
Start: 2025-09-03

## 2025-08-05 RX ORDER — ALBUTEROL SULFATE 90 UG/1
4 INHALANT RESPIRATORY (INHALATION) PRN
OUTPATIENT
Start: 2025-09-03

## 2025-08-05 RX ORDER — DIPHENHYDRAMINE HYDROCHLORIDE 50 MG/ML
50 INJECTION, SOLUTION INTRAMUSCULAR; INTRAVENOUS
OUTPATIENT
Start: 2025-09-03

## 2025-08-05 RX ORDER — ALBUTEROL SULFATE 90 UG/1
4 INHALANT RESPIRATORY (INHALATION) PRN
Status: CANCELLED | OUTPATIENT
Start: 2025-09-03

## 2025-08-05 RX ORDER — ONDANSETRON 2 MG/ML
8 INJECTION INTRAMUSCULAR; INTRAVENOUS
Status: CANCELLED | OUTPATIENT
Start: 2025-09-03

## 2025-08-05 RX ORDER — ONDANSETRON 2 MG/ML
8 INJECTION INTRAMUSCULAR; INTRAVENOUS
OUTPATIENT
Start: 2025-09-03

## 2025-08-05 RX ORDER — DIPHENHYDRAMINE HYDROCHLORIDE 50 MG/ML
50 INJECTION, SOLUTION INTRAMUSCULAR; INTRAVENOUS
Status: CANCELLED | OUTPATIENT
Start: 2025-09-03

## 2025-08-05 RX ORDER — SODIUM CHLORIDE 9 MG/ML
INJECTION, SOLUTION INTRAVENOUS PRN
OUTPATIENT
Start: 2025-09-03

## 2025-08-05 RX ORDER — SODIUM CHLORIDE 9 MG/ML
INJECTION, SOLUTION INTRAVENOUS PRN
Status: CANCELLED | OUTPATIENT
Start: 2025-09-03

## 2025-08-05 RX ORDER — ACETAMINOPHEN 325 MG/1
650 TABLET ORAL
OUTPATIENT
Start: 2025-09-03

## 2025-08-05 RX ORDER — HYDROCORTISONE SODIUM SUCCINATE 100 MG/2ML
100 INJECTION INTRAMUSCULAR; INTRAVENOUS
OUTPATIENT
Start: 2025-09-03

## 2025-08-05 RX ORDER — HYDROCORTISONE SODIUM SUCCINATE 100 MG/2ML
100 INJECTION INTRAMUSCULAR; INTRAVENOUS
Status: CANCELLED | OUTPATIENT
Start: 2025-09-03

## 2025-08-05 RX ORDER — EPINEPHRINE 1 MG/ML
0.3 INJECTION, SOLUTION, CONCENTRATE INTRAVENOUS PRN
Status: CANCELLED | OUTPATIENT
Start: 2025-09-03

## 2025-08-05 RX ADMIN — DEXAMETHASONE 2 MG: 0.5 TABLET ORAL at 20:56

## 2025-08-05 RX ADMIN — ROSUVASTATIN 20 MG: 10 TABLET, FILM COATED ORAL at 08:20

## 2025-08-05 RX ADMIN — BUDESONIDE 500 MCG: 0.5 INHALANT RESPIRATORY (INHALATION) at 07:47

## 2025-08-05 RX ADMIN — SODIUM CHLORIDE, PRESERVATIVE FREE 10 ML: 5 INJECTION INTRAVENOUS at 08:22

## 2025-08-05 RX ADMIN — HYDROXYZINE HYDROCHLORIDE 25 MG: 25 TABLET ORAL at 17:45

## 2025-08-05 RX ADMIN — OXYCODONE 5 MG: 5 TABLET ORAL at 20:56

## 2025-08-05 RX ADMIN — PYRIDOSTIGMINE BROMIDE 60 MG: 60 TABLET ORAL at 20:56

## 2025-08-05 RX ADMIN — ARFORMOTEROL TARTRATE 15 MCG: 15 SOLUTION RESPIRATORY (INHALATION) at 19:38

## 2025-08-05 RX ADMIN — AMLODIPINE BESYLATE 10 MG: 5 TABLET ORAL at 08:21

## 2025-08-05 RX ADMIN — DEGARELIX 240 MG: KIT at 15:15

## 2025-08-05 RX ADMIN — EZETIMIBE 10 MG: 10 TABLET ORAL at 08:21

## 2025-08-05 RX ADMIN — ARFORMOTEROL TARTRATE 15 MCG: 15 SOLUTION RESPIRATORY (INHALATION) at 07:47

## 2025-08-05 RX ADMIN — SODIUM CHLORIDE 2000 MG: 9 INJECTION INTRAMUSCULAR; INTRAVENOUS; SUBCUTANEOUS at 15:36

## 2025-08-05 RX ADMIN — PYRIDOSTIGMINE BROMIDE 60 MG: 60 TABLET ORAL at 15:36

## 2025-08-05 RX ADMIN — PANTOPRAZOLE SODIUM 40 MG: 40 TABLET, DELAYED RELEASE ORAL at 12:00

## 2025-08-05 RX ADMIN — TAMSULOSIN HYDROCHLORIDE 0.4 MG: 0.4 CAPSULE ORAL at 08:20

## 2025-08-05 RX ADMIN — MYCOPHENOLATE MOFETIL 1000 MG: 250 CAPSULE ORAL at 20:56

## 2025-08-05 RX ADMIN — PYRIDOSTIGMINE BROMIDE 60 MG: 60 TABLET ORAL at 09:25

## 2025-08-05 RX ADMIN — DEXAMETHASONE 2 MG: 0.5 TABLET ORAL at 08:21

## 2025-08-05 RX ADMIN — THERA TABS 1 TABLET: TAB at 08:20

## 2025-08-05 RX ADMIN — METOPROLOL SUCCINATE 50 MG: 50 TABLET, EXTENDED RELEASE ORAL at 08:21

## 2025-08-05 RX ADMIN — BUDESONIDE 500 MCG: 0.5 INHALANT RESPIRATORY (INHALATION) at 19:38

## 2025-08-05 RX ADMIN — TRAZODONE HYDROCHLORIDE 50 MG: 50 TABLET ORAL at 20:57

## 2025-08-05 RX ADMIN — MYCOPHENOLATE MOFETIL 1000 MG: 250 CAPSULE ORAL at 08:21

## 2025-08-05 RX ADMIN — SODIUM CHLORIDE, PRESERVATIVE FREE 10 ML: 5 INJECTION INTRAVENOUS at 20:57

## 2025-08-05 ASSESSMENT — PAIN SCALES - GENERAL: PAINLEVEL_OUTOF10: 0

## 2025-08-05 ASSESSMENT — PAIN DESCRIPTION - ORIENTATION: ORIENTATION: MID

## 2025-08-05 ASSESSMENT — PAIN DESCRIPTION - DESCRIPTORS: DESCRIPTORS: ACHING;DISCOMFORT

## 2025-08-05 ASSESSMENT — PAIN - FUNCTIONAL ASSESSMENT: PAIN_FUNCTIONAL_ASSESSMENT: ACTIVITIES ARE NOT PREVENTED

## 2025-08-05 ASSESSMENT — PAIN DESCRIPTION - LOCATION: LOCATION: PELVIS

## 2025-08-06 VITALS
BODY MASS INDEX: 26.56 KG/M2 | HEART RATE: 105 BPM | WEIGHT: 159.39 LBS | RESPIRATION RATE: 23 BRPM | DIASTOLIC BLOOD PRESSURE: 58 MMHG | HEIGHT: 65 IN | SYSTOLIC BLOOD PRESSURE: 114 MMHG | TEMPERATURE: 97.6 F | OXYGEN SATURATION: 92 %

## 2025-08-06 LAB
ALBUMIN SERPL-MCNC: 3.3 G/DL (ref 3.5–5.2)
ALBUMIN/GLOB SERPL: 1.3 (ref 1.1–2.2)
ALP SERPL-CCNC: 66 U/L (ref 40–129)
ALT SERPL-CCNC: 39 U/L (ref 10–50)
ANION GAP SERPL CALC-SCNC: 14 MMOL/L (ref 2–14)
AST SERPL-CCNC: 45 U/L (ref 10–50)
BILIRUB SERPL-MCNC: 0.4 MG/DL (ref 0–1.2)
BUN SERPL-MCNC: 30 MG/DL (ref 8–23)
BUN/CREAT SERPL: 34 (ref 12–20)
CALCIUM SERPL-MCNC: 9.5 MG/DL (ref 8.8–10.2)
CHLORIDE SERPL-SCNC: 96 MMOL/L (ref 98–107)
CO2 SERPL-SCNC: 25 MMOL/L (ref 20–29)
CREAT SERPL-MCNC: 0.89 MG/DL (ref 0.7–1.2)
ERYTHROCYTE [DISTWIDTH] IN BLOOD BY AUTOMATED COUNT: 12.7 % (ref 11.5–14.5)
GLOBULIN SER CALC-MCNC: 2.6 G/DL (ref 2–4)
GLUCOSE SERPL-MCNC: 178 MG/DL (ref 65–100)
HCT VFR BLD AUTO: 41.7 % (ref 36.6–50.3)
HGB BLD-MCNC: 13.9 G/DL (ref 12.1–17)
MCH RBC QN AUTO: 29.3 PG (ref 26–34)
MCHC RBC AUTO-ENTMCNC: 33.3 G/DL (ref 30–36.5)
MCV RBC AUTO: 88 FL (ref 80–99)
NRBC # BLD: 0 K/UL (ref 0–0.01)
NRBC BLD-RTO: 0 PER 100 WBC
PLATELET # BLD AUTO: 318 K/UL (ref 150–400)
PMV BLD AUTO: 9.8 FL (ref 8.9–12.9)
POTASSIUM SERPL-SCNC: 4.5 MMOL/L (ref 3.5–5.1)
PROT SERPL-MCNC: 5.9 G/DL (ref 6.4–8.3)
RBC # BLD AUTO: 4.74 M/UL (ref 4.1–5.7)
SODIUM SERPL-SCNC: 135 MMOL/L (ref 136–145)
WBC # BLD AUTO: 17.5 K/UL (ref 4.1–11.1)

## 2025-08-06 PROCEDURE — 6370000000 HC RX 637 (ALT 250 FOR IP): Performed by: UROLOGY

## 2025-08-06 PROCEDURE — 94640 AIRWAY INHALATION TREATMENT: CPT

## 2025-08-06 PROCEDURE — 85027 COMPLETE CBC AUTOMATED: CPT

## 2025-08-06 PROCEDURE — 6370000000 HC RX 637 (ALT 250 FOR IP): Performed by: PHYSICIAN ASSISTANT

## 2025-08-06 PROCEDURE — 6370000000 HC RX 637 (ALT 250 FOR IP): Performed by: HOSPITALIST

## 2025-08-06 PROCEDURE — 94760 N-INVAS EAR/PLS OXIMETRY 1: CPT

## 2025-08-06 PROCEDURE — 2500000003 HC RX 250 WO HCPCS: Performed by: UROLOGY

## 2025-08-06 PROCEDURE — 6360000002 HC RX W HCPCS: Performed by: HOSPITALIST

## 2025-08-06 PROCEDURE — 99232 SBSQ HOSP IP/OBS MODERATE 35: CPT | Performed by: STUDENT IN AN ORGANIZED HEALTH CARE EDUCATION/TRAINING PROGRAM

## 2025-08-06 PROCEDURE — 6360000002 HC RX W HCPCS: Performed by: UROLOGY

## 2025-08-06 PROCEDURE — 80053 COMPREHEN METABOLIC PANEL: CPT

## 2025-08-06 PROCEDURE — G0545 PR INHERENT VISIT TO INPT: HCPCS | Performed by: STUDENT IN AN ORGANIZED HEALTH CARE EDUCATION/TRAINING PROGRAM

## 2025-08-06 RX ORDER — SULFAMETHOXAZOLE AND TRIMETHOPRIM 800; 160 MG/1; MG/1
2 TABLET ORAL EVERY 12 HOURS SCHEDULED
Qty: 38 TABLET | Refills: 0 | Status: SHIPPED | OUTPATIENT
Start: 2025-08-06 | End: 2025-08-16

## 2025-08-06 RX ORDER — BENZONATATE 200 MG/1
200 CAPSULE ORAL 3 TIMES DAILY PRN
Qty: 21 CAPSULE | Refills: 0 | Status: SHIPPED
Start: 2025-08-06 | End: 2025-08-06

## 2025-08-06 RX ORDER — PANTOPRAZOLE SODIUM 40 MG/1
40 TABLET, DELAYED RELEASE ORAL
Qty: 30 TABLET | Refills: 3 | Status: SHIPPED | OUTPATIENT
Start: 2025-08-07

## 2025-08-06 RX ORDER — SULFAMETHOXAZOLE AND TRIMETHOPRIM 800; 160 MG/1; MG/1
2 TABLET ORAL EVERY 12 HOURS SCHEDULED
Status: DISCONTINUED | OUTPATIENT
Start: 2025-08-06 | End: 2025-08-06 | Stop reason: HOSPADM

## 2025-08-06 RX ORDER — SULFAMETHOXAZOLE AND TRIMETHOPRIM 800; 160 MG/1; MG/1
1 TABLET ORAL 2 TIMES DAILY
Qty: 18 TABLET | Refills: 0 | Status: SHIPPED | OUTPATIENT
Start: 2025-08-06 | End: 2025-08-06 | Stop reason: HOSPADM

## 2025-08-06 RX ORDER — HYDROXYZINE HYDROCHLORIDE 25 MG/1
25 TABLET, FILM COATED ORAL 3 TIMES DAILY PRN
Qty: 21 TABLET | Refills: 0 | Status: SHIPPED | OUTPATIENT
Start: 2025-08-06 | End: 2025-08-16

## 2025-08-06 RX ORDER — TAMSULOSIN HYDROCHLORIDE 0.4 MG/1
0.4 CAPSULE ORAL DAILY
Qty: 30 CAPSULE | Refills: 3 | Status: SHIPPED | OUTPATIENT
Start: 2025-08-07

## 2025-08-06 RX ORDER — BENZONATATE 200 MG/1
200 CAPSULE ORAL 3 TIMES DAILY PRN
Qty: 30 CAPSULE | Refills: 0 | Status: SHIPPED | OUTPATIENT
Start: 2025-08-06 | End: 2025-08-16

## 2025-08-06 RX ORDER — DEXAMETHASONE 2 MG/1
2 TABLET ORAL EVERY 12 HOURS SCHEDULED
Qty: 20 TABLET | Refills: 0 | Status: ON HOLD
Start: 2025-08-06 | End: 2025-08-15 | Stop reason: HOSPADM

## 2025-08-06 RX ORDER — OXYCODONE HYDROCHLORIDE 5 MG/1
5 TABLET ORAL EVERY 6 HOURS PRN
Qty: 12 TABLET | Refills: 0 | Status: SHIPPED | OUTPATIENT
Start: 2025-08-06 | End: 2025-08-09

## 2025-08-06 RX ADMIN — DEXAMETHASONE 2 MG: 0.5 TABLET ORAL at 08:35

## 2025-08-06 RX ADMIN — MYCOPHENOLATE MOFETIL 1000 MG: 250 CAPSULE ORAL at 08:35

## 2025-08-06 RX ADMIN — TAMSULOSIN HYDROCHLORIDE 0.4 MG: 0.4 CAPSULE ORAL at 08:35

## 2025-08-06 RX ADMIN — BUDESONIDE 500 MCG: 0.5 INHALANT RESPIRATORY (INHALATION) at 09:19

## 2025-08-06 RX ADMIN — EZETIMIBE 10 MG: 10 TABLET ORAL at 08:35

## 2025-08-06 RX ADMIN — THERA TABS 1 TABLET: TAB at 08:35

## 2025-08-06 RX ADMIN — PANTOPRAZOLE SODIUM 40 MG: 40 TABLET, DELAYED RELEASE ORAL at 07:01

## 2025-08-06 RX ADMIN — SODIUM CHLORIDE, PRESERVATIVE FREE 10 ML: 5 INJECTION INTRAVENOUS at 08:35

## 2025-08-06 RX ADMIN — ROSUVASTATIN 20 MG: 10 TABLET, FILM COATED ORAL at 08:35

## 2025-08-06 RX ADMIN — METOPROLOL SUCCINATE 50 MG: 50 TABLET, EXTENDED RELEASE ORAL at 08:35

## 2025-08-06 RX ADMIN — ARFORMOTEROL TARTRATE 15 MCG: 15 SOLUTION RESPIRATORY (INHALATION) at 09:18

## 2025-08-06 RX ADMIN — PYRIDOSTIGMINE BROMIDE 60 MG: 60 TABLET ORAL at 08:35

## 2025-08-08 ENCOUNTER — ANESTHESIA EVENT (OUTPATIENT)
Facility: HOSPITAL | Age: 75
End: 2025-08-08
Payer: MEDICARE

## 2025-08-08 LAB
BACTERIA SPEC CULT: NORMAL
BACTERIA SPEC CULT: NORMAL
SERVICE CMNT-IMP: NORMAL
SERVICE CMNT-IMP: NORMAL

## 2025-08-11 ENCOUNTER — HOSPITAL ENCOUNTER (INPATIENT)
Facility: HOSPITAL | Age: 75
LOS: 6 days | Discharge: HOME | DRG: 025 | End: 2025-08-17
Attending: NEUROLOGICAL SURGERY | Admitting: NEUROLOGICAL SURGERY
Payer: MEDICARE

## 2025-08-11 ENCOUNTER — ANESTHESIA (OUTPATIENT)
Facility: HOSPITAL | Age: 75
End: 2025-08-11
Payer: MEDICARE

## 2025-08-11 ENCOUNTER — APPOINTMENT (OUTPATIENT)
Facility: HOSPITAL | Age: 75
DRG: 025 | End: 2025-08-11
Attending: NEUROLOGICAL SURGERY
Payer: MEDICARE

## 2025-08-11 DIAGNOSIS — Z98.890 S/P CRANIOTOMY: Primary | ICD-10-CM

## 2025-08-11 PROBLEM — D49.6 BRAIN TUMOR (HCC): Status: ACTIVE | Noted: 2025-08-11

## 2025-08-11 LAB
ABO + RH BLD: NORMAL
BLOOD GROUP ANTIBODIES SERPL: NORMAL
GLUCOSE BLD STRIP.AUTO-MCNC: 185 MG/DL (ref 65–117)
GLUCOSE BLD STRIP.AUTO-MCNC: 202 MG/DL (ref 65–117)
GLUCOSE BLD STRIP.AUTO-MCNC: 203 MG/DL (ref 65–117)
GLUCOSE BLD STRIP.AUTO-MCNC: 288 MG/DL (ref 65–117)
SERVICE CMNT-IMP: ABNORMAL
SPECIMEN EXP DATE BLD: NORMAL

## 2025-08-11 PROCEDURE — 2580000003 HC RX 258: Performed by: NURSE ANESTHETIST, CERTIFIED REGISTERED

## 2025-08-11 PROCEDURE — 2709999900 HC NON-CHARGEABLE SUPPLY: Performed by: NEUROLOGICAL SURGERY

## 2025-08-11 PROCEDURE — 6360000002 HC RX W HCPCS: Performed by: NEUROLOGICAL SURGERY

## 2025-08-11 PROCEDURE — 88333 PATH CONSLTJ SURG CYTO XM 1: CPT

## 2025-08-11 PROCEDURE — 86901 BLOOD TYPING SEROLOGIC RH(D): CPT

## 2025-08-11 PROCEDURE — 99024 POSTOP FOLLOW-UP VISIT: CPT | Performed by: NURSE PRACTITIONER

## 2025-08-11 PROCEDURE — 2500000003 HC RX 250 WO HCPCS: Performed by: NURSE ANESTHETIST, CERTIFIED REGISTERED

## 2025-08-11 PROCEDURE — 3700000000 HC ANESTHESIA ATTENDED CARE: Performed by: NEUROLOGICAL SURGERY

## 2025-08-11 PROCEDURE — 88341 IMHCHEM/IMCYTCHM EA ADD ANTB: CPT

## 2025-08-11 PROCEDURE — 2500000003 HC RX 250 WO HCPCS: Performed by: NEUROLOGICAL SURGERY

## 2025-08-11 PROCEDURE — 3600000004 HC SURGERY LEVEL 4 BASE: Performed by: NEUROLOGICAL SURGERY

## 2025-08-11 PROCEDURE — 6370000000 HC RX 637 (ALT 250 FOR IP): Performed by: NURSE PRACTITIONER

## 2025-08-11 PROCEDURE — 7100000001 HC PACU RECOVERY - ADDTL 15 MIN: Performed by: NEUROLOGICAL SURGERY

## 2025-08-11 PROCEDURE — 70460 CT HEAD/BRAIN W/DYE: CPT

## 2025-08-11 PROCEDURE — C1713 ANCHOR/SCREW BN/BN,TIS/BN: HCPCS | Performed by: NEUROLOGICAL SURGERY

## 2025-08-11 PROCEDURE — 03HY32Z INSERTION OF MONITORING DEVICE INTO UPPER ARTERY, PERCUTANEOUS APPROACH: ICD-10-PCS | Performed by: ANESTHESIOLOGY

## 2025-08-11 PROCEDURE — 82962 GLUCOSE BLOOD TEST: CPT

## 2025-08-11 PROCEDURE — 88331 PATH CONSLTJ SURG 1 BLK 1SPC: CPT

## 2025-08-11 PROCEDURE — 86900 BLOOD TYPING SEROLOGIC ABO: CPT

## 2025-08-11 PROCEDURE — 3700000001 HC ADD 15 MINUTES (ANESTHESIA): Performed by: NEUROLOGICAL SURGERY

## 2025-08-11 PROCEDURE — 3600000014 HC SURGERY LEVEL 4 ADDTL 15MIN: Performed by: NEUROLOGICAL SURGERY

## 2025-08-11 PROCEDURE — 6360000002 HC RX W HCPCS: Performed by: ANESTHESIOLOGY

## 2025-08-11 PROCEDURE — 88342 IMHCHEM/IMCYTCHM 1ST ANTB: CPT

## 2025-08-11 PROCEDURE — 88307 TISSUE EXAM BY PATHOLOGIST: CPT

## 2025-08-11 PROCEDURE — 2720000010 HC SURG SUPPLY STERILE: Performed by: NEUROLOGICAL SURGERY

## 2025-08-11 PROCEDURE — 6370000000 HC RX 637 (ALT 250 FOR IP): Performed by: NEUROLOGICAL SURGERY

## 2025-08-11 PROCEDURE — 7100000000 HC PACU RECOVERY - FIRST 15 MIN: Performed by: NEUROLOGICAL SURGERY

## 2025-08-11 PROCEDURE — 86850 RBC ANTIBODY SCREEN: CPT

## 2025-08-11 PROCEDURE — 6360000002 HC RX W HCPCS: Performed by: NURSE ANESTHETIST, CERTIFIED REGISTERED

## 2025-08-11 PROCEDURE — 2000000000 HC ICU R&B

## 2025-08-11 DEVICE — IMPLANTABLE DEVICE: Type: IMPLANTABLE DEVICE | Site: CRANIAL | Status: FUNCTIONAL

## 2025-08-11 RX ORDER — LABETALOL HYDROCHLORIDE 5 MG/ML
10 INJECTION, SOLUTION INTRAVENOUS
Status: DISCONTINUED | OUTPATIENT
Start: 2025-08-11 | End: 2025-08-11 | Stop reason: HOSPADM

## 2025-08-11 RX ORDER — HYDROMORPHONE HYDROCHLORIDE 1 MG/ML
0.5 INJECTION, SOLUTION INTRAMUSCULAR; INTRAVENOUS; SUBCUTANEOUS
Status: DISCONTINUED | OUTPATIENT
Start: 2025-08-11 | End: 2025-08-13

## 2025-08-11 RX ORDER — SODIUM CHLORIDE 0.9 % (FLUSH) 0.9 %
5-40 SYRINGE (ML) INJECTION EVERY 12 HOURS SCHEDULED
Status: DISCONTINUED | OUTPATIENT
Start: 2025-08-11 | End: 2025-08-11 | Stop reason: HOSPADM

## 2025-08-11 RX ORDER — SODIUM CHLORIDE AND POTASSIUM CHLORIDE 150; 900 MG/100ML; MG/100ML
INJECTION, SOLUTION INTRAVENOUS CONTINUOUS
Status: DISCONTINUED | OUTPATIENT
Start: 2025-08-11 | End: 2025-08-12

## 2025-08-11 RX ORDER — SODIUM CHLORIDE, SODIUM LACTATE, POTASSIUM CHLORIDE, CALCIUM CHLORIDE 600; 310; 30; 20 MG/100ML; MG/100ML; MG/100ML; MG/100ML
INJECTION, SOLUTION INTRAVENOUS
Status: DISCONTINUED | OUTPATIENT
Start: 2025-08-11 | End: 2025-08-11 | Stop reason: SDUPTHER

## 2025-08-11 RX ORDER — SODIUM CHLORIDE 9 MG/ML
INJECTION, SOLUTION INTRAVENOUS PRN
Status: DISCONTINUED | OUTPATIENT
Start: 2025-08-11 | End: 2025-08-11 | Stop reason: HOSPADM

## 2025-08-11 RX ORDER — PROPOFOL 10 MG/ML
INJECTION, EMULSION INTRAVENOUS
Status: DISCONTINUED | OUTPATIENT
Start: 2025-08-11 | End: 2025-08-11 | Stop reason: SDUPTHER

## 2025-08-11 RX ORDER — SODIUM CHLORIDE 9 MG/ML
INJECTION, SOLUTION INTRAVENOUS CONTINUOUS
Status: DISCONTINUED | OUTPATIENT
Start: 2025-08-11 | End: 2025-08-11 | Stop reason: HOSPADM

## 2025-08-11 RX ORDER — LIDOCAINE HYDROCHLORIDE AND EPINEPHRINE 10; 10 MG/ML; UG/ML
INJECTION, SOLUTION INFILTRATION; PERINEURAL PRN
Status: DISCONTINUED | OUTPATIENT
Start: 2025-08-11 | End: 2025-08-11 | Stop reason: HOSPADM

## 2025-08-11 RX ORDER — BACITRACIN ZINC 500 [USP'U]/G
OINTMENT TOPICAL PRN
Status: DISCONTINUED | OUTPATIENT
Start: 2025-08-11 | End: 2025-08-11 | Stop reason: HOSPADM

## 2025-08-11 RX ORDER — SODIUM CHLORIDE 0.9 % (FLUSH) 0.9 %
5-40 SYRINGE (ML) INJECTION PRN
Status: DISCONTINUED | OUTPATIENT
Start: 2025-08-11 | End: 2025-08-11 | Stop reason: HOSPADM

## 2025-08-11 RX ORDER — SODIUM CHLORIDE 0.9 % (FLUSH) 0.9 %
5-40 SYRINGE (ML) INJECTION EVERY 12 HOURS SCHEDULED
Status: DISCONTINUED | OUTPATIENT
Start: 2025-08-11 | End: 2025-08-17 | Stop reason: HOSPADM

## 2025-08-11 RX ORDER — PHENYLEPHRINE HCL IN 0.9% NACL 0.4MG/10ML
SYRINGE (ML) INTRAVENOUS
Status: DISCONTINUED | OUTPATIENT
Start: 2025-08-11 | End: 2025-08-11 | Stop reason: SDUPTHER

## 2025-08-11 RX ORDER — LIDOCAINE HYDROCHLORIDE 20 MG/ML
INJECTION, SOLUTION EPIDURAL; INFILTRATION; INTRACAUDAL; PERINEURAL
Status: DISCONTINUED | OUTPATIENT
Start: 2025-08-11 | End: 2025-08-11 | Stop reason: SDUPTHER

## 2025-08-11 RX ORDER — GLIPIZIDE 5 MG/1
10 TABLET ORAL
Status: DISCONTINUED | OUTPATIENT
Start: 2025-08-12 | End: 2025-08-12

## 2025-08-11 RX ORDER — ACETAMINOPHEN 325 MG/1
650 TABLET ORAL EVERY 6 HOURS
Status: DISCONTINUED | OUTPATIENT
Start: 2025-08-11 | End: 2025-08-17 | Stop reason: HOSPADM

## 2025-08-11 RX ORDER — ROSUVASTATIN CALCIUM 10 MG/1
20 TABLET, COATED ORAL
Status: DISCONTINUED | OUTPATIENT
Start: 2025-08-11 | End: 2025-08-17 | Stop reason: HOSPADM

## 2025-08-11 RX ORDER — IOPAMIDOL 755 MG/ML
100 INJECTION, SOLUTION INTRAVASCULAR
Status: DISCONTINUED | OUTPATIENT
Start: 2025-08-11 | End: 2025-08-17 | Stop reason: HOSPADM

## 2025-08-11 RX ORDER — DEXTROSE MONOHYDRATE 100 MG/ML
INJECTION, SOLUTION INTRAVENOUS CONTINUOUS PRN
Status: DISCONTINUED | OUTPATIENT
Start: 2025-08-11 | End: 2025-08-12 | Stop reason: SDUPTHER

## 2025-08-11 RX ORDER — BENZONATATE 100 MG/1
200 CAPSULE ORAL 3 TIMES DAILY PRN
Status: DISCONTINUED | OUTPATIENT
Start: 2025-08-11 | End: 2025-08-17 | Stop reason: HOSPADM

## 2025-08-11 RX ORDER — SODIUM CHLORIDE, SODIUM LACTATE, POTASSIUM CHLORIDE, CALCIUM CHLORIDE 600; 310; 30; 20 MG/100ML; MG/100ML; MG/100ML; MG/100ML
INJECTION, SOLUTION INTRAVENOUS CONTINUOUS
Status: DISCONTINUED | OUTPATIENT
Start: 2025-08-11 | End: 2025-08-11 | Stop reason: HOSPADM

## 2025-08-11 RX ORDER — METOPROLOL SUCCINATE 50 MG/1
50 TABLET, EXTENDED RELEASE ORAL
Status: DISCONTINUED | OUTPATIENT
Start: 2025-08-11 | End: 2025-08-17 | Stop reason: HOSPADM

## 2025-08-11 RX ORDER — ONDANSETRON 2 MG/ML
INJECTION INTRAMUSCULAR; INTRAVENOUS
Status: DISCONTINUED | OUTPATIENT
Start: 2025-08-11 | End: 2025-08-11 | Stop reason: SDUPTHER

## 2025-08-11 RX ORDER — MYCOPHENOLATE MOFETIL 250 MG/1
1000 CAPSULE ORAL 2 TIMES DAILY
Status: DISCONTINUED | OUTPATIENT
Start: 2025-08-11 | End: 2025-08-17 | Stop reason: HOSPADM

## 2025-08-11 RX ORDER — FENTANYL CITRATE 50 UG/ML
25 INJECTION, SOLUTION INTRAMUSCULAR; INTRAVENOUS EVERY 5 MIN PRN
Status: DISCONTINUED | OUTPATIENT
Start: 2025-08-11 | End: 2025-08-11 | Stop reason: HOSPADM

## 2025-08-11 RX ORDER — ROCURONIUM BROMIDE 10 MG/ML
INJECTION, SOLUTION INTRAVENOUS
Status: DISCONTINUED | OUTPATIENT
Start: 2025-08-11 | End: 2025-08-11 | Stop reason: SDUPTHER

## 2025-08-11 RX ORDER — SODIUM CHLORIDE 0.9 % (FLUSH) 0.9 %
5-40 SYRINGE (ML) INJECTION PRN
Status: DISCONTINUED | OUTPATIENT
Start: 2025-08-11 | End: 2025-08-17 | Stop reason: HOSPADM

## 2025-08-11 RX ORDER — HYDROMORPHONE HYDROCHLORIDE 1 MG/ML
0.5 INJECTION, SOLUTION INTRAMUSCULAR; INTRAVENOUS; SUBCUTANEOUS EVERY 5 MIN PRN
Status: DISCONTINUED | OUTPATIENT
Start: 2025-08-11 | End: 2025-08-11 | Stop reason: HOSPADM

## 2025-08-11 RX ORDER — SODIUM CHLORIDE 9 MG/ML
INJECTION, SOLUTION INTRAVENOUS PRN
Status: DISCONTINUED | OUTPATIENT
Start: 2025-08-11 | End: 2025-08-17 | Stop reason: HOSPADM

## 2025-08-11 RX ORDER — FENTANYL CITRATE 50 UG/ML
INJECTION, SOLUTION INTRAMUSCULAR; INTRAVENOUS
Status: DISCONTINUED | OUTPATIENT
Start: 2025-08-11 | End: 2025-08-11 | Stop reason: SDUPTHER

## 2025-08-11 RX ORDER — DEXAMETHASONE SODIUM PHOSPHATE 4 MG/ML
4 INJECTION, SOLUTION INTRA-ARTICULAR; INTRALESIONAL; INTRAMUSCULAR; INTRAVENOUS; SOFT TISSUE EVERY 6 HOURS
Status: DISCONTINUED | OUTPATIENT
Start: 2025-08-11 | End: 2025-08-14

## 2025-08-11 RX ORDER — MANNITOL 20 G/100ML
INJECTION, SOLUTION INTRAVENOUS
Status: DISCONTINUED | OUTPATIENT
Start: 2025-08-11 | End: 2025-08-11 | Stop reason: SDUPTHER

## 2025-08-11 RX ORDER — LEVETIRACETAM 500 MG/5ML
INJECTION, SOLUTION, CONCENTRATE INTRAVENOUS
Status: DISCONTINUED | OUTPATIENT
Start: 2025-08-11 | End: 2025-08-11 | Stop reason: SDUPTHER

## 2025-08-11 RX ORDER — BISACODYL 5 MG/1
5 TABLET, DELAYED RELEASE ORAL DAILY PRN
Status: DISCONTINUED | OUTPATIENT
Start: 2025-08-11 | End: 2025-08-17 | Stop reason: HOSPADM

## 2025-08-11 RX ORDER — LEVETIRACETAM 500 MG/5ML
500 INJECTION, SOLUTION, CONCENTRATE INTRAVENOUS EVERY 12 HOURS
Status: DISCONTINUED | OUTPATIENT
Start: 2025-08-11 | End: 2025-08-14

## 2025-08-11 RX ORDER — MIDAZOLAM HYDROCHLORIDE 2 MG/2ML
2 INJECTION, SOLUTION INTRAMUSCULAR; INTRAVENOUS ONCE
Status: COMPLETED | OUTPATIENT
Start: 2025-08-11 | End: 2025-08-11

## 2025-08-11 RX ORDER — HYDROXYZINE HYDROCHLORIDE 25 MG/1
25 TABLET, FILM COATED ORAL 3 TIMES DAILY PRN
Status: DISCONTINUED | OUTPATIENT
Start: 2025-08-11 | End: 2025-08-13

## 2025-08-11 RX ORDER — EZETIMIBE 10 MG/1
10 TABLET ORAL DAILY
Status: DISCONTINUED | OUTPATIENT
Start: 2025-08-11 | End: 2025-08-17 | Stop reason: HOSPADM

## 2025-08-11 RX ORDER — PROCHLORPERAZINE EDISYLATE 5 MG/ML
5 INJECTION INTRAMUSCULAR; INTRAVENOUS
Status: DISCONTINUED | OUTPATIENT
Start: 2025-08-11 | End: 2025-08-11 | Stop reason: HOSPADM

## 2025-08-11 RX ORDER — INSULIN LISPRO 100 [IU]/ML
0-8 INJECTION, SOLUTION INTRAVENOUS; SUBCUTANEOUS
Status: DISCONTINUED | OUTPATIENT
Start: 2025-08-11 | End: 2025-08-13

## 2025-08-11 RX ORDER — OXYCODONE HYDROCHLORIDE 5 MG/1
10 TABLET ORAL EVERY 4 HOURS PRN
Status: DISCONTINUED | OUTPATIENT
Start: 2025-08-11 | End: 2025-08-17 | Stop reason: HOSPADM

## 2025-08-11 RX ORDER — ONDANSETRON 4 MG/1
4 TABLET, ORALLY DISINTEGRATING ORAL EVERY 8 HOURS PRN
Status: DISCONTINUED | OUTPATIENT
Start: 2025-08-11 | End: 2025-08-17 | Stop reason: HOSPADM

## 2025-08-11 RX ORDER — OXYCODONE HYDROCHLORIDE 5 MG/1
5 TABLET ORAL EVERY 4 HOURS PRN
Status: DISCONTINUED | OUTPATIENT
Start: 2025-08-11 | End: 2025-08-17 | Stop reason: HOSPADM

## 2025-08-11 RX ORDER — PYRIDOSTIGMINE BROMIDE 60 MG/1
60 TABLET ORAL 3 TIMES DAILY
Status: DISCONTINUED | OUTPATIENT
Start: 2025-08-11 | End: 2025-08-17 | Stop reason: HOSPADM

## 2025-08-11 RX ORDER — TRAZODONE HYDROCHLORIDE 50 MG/1
50 TABLET ORAL NIGHTLY
Status: DISCONTINUED | OUTPATIENT
Start: 2025-08-11 | End: 2025-08-17 | Stop reason: HOSPADM

## 2025-08-11 RX ORDER — DEXAMETHASONE SODIUM PHOSPHATE 4 MG/ML
INJECTION, SOLUTION INTRA-ARTICULAR; INTRALESIONAL; INTRAMUSCULAR; INTRAVENOUS; SOFT TISSUE
Status: DISCONTINUED | OUTPATIENT
Start: 2025-08-11 | End: 2025-08-11 | Stop reason: SDUPTHER

## 2025-08-11 RX ORDER — PANTOPRAZOLE SODIUM 40 MG/1
40 TABLET, DELAYED RELEASE ORAL
Status: DISCONTINUED | OUTPATIENT
Start: 2025-08-12 | End: 2025-08-17 | Stop reason: HOSPADM

## 2025-08-11 RX ORDER — ONDANSETRON 2 MG/ML
4 INJECTION INTRAMUSCULAR; INTRAVENOUS
Status: DISCONTINUED | OUTPATIENT
Start: 2025-08-11 | End: 2025-08-11 | Stop reason: HOSPADM

## 2025-08-11 RX ORDER — ONDANSETRON 2 MG/ML
4 INJECTION INTRAMUSCULAR; INTRAVENOUS EVERY 6 HOURS PRN
Status: DISCONTINUED | OUTPATIENT
Start: 2025-08-11 | End: 2025-08-17 | Stop reason: HOSPADM

## 2025-08-11 RX ORDER — SENNA AND DOCUSATE SODIUM 50; 8.6 MG/1; MG/1
1 TABLET, FILM COATED ORAL 2 TIMES DAILY
Status: DISCONTINUED | OUTPATIENT
Start: 2025-08-11 | End: 2025-08-17 | Stop reason: HOSPADM

## 2025-08-11 RX ORDER — MANNITOL 20 G/100ML
INJECTION, SOLUTION INTRAVENOUS
Status: DISCONTINUED | OUTPATIENT
Start: 2025-08-11 | End: 2025-08-11

## 2025-08-11 RX ORDER — SULFAMETHOXAZOLE AND TRIMETHOPRIM 800; 160 MG/1; MG/1
2 TABLET ORAL EVERY 12 HOURS SCHEDULED
Status: DISCONTINUED | OUTPATIENT
Start: 2025-08-11 | End: 2025-08-12

## 2025-08-11 RX ORDER — TAMSULOSIN HYDROCHLORIDE 0.4 MG/1
0.4 CAPSULE ORAL DAILY
Status: DISCONTINUED | OUTPATIENT
Start: 2025-08-12 | End: 2025-08-17 | Stop reason: HOSPADM

## 2025-08-11 RX ORDER — HYDRALAZINE HYDROCHLORIDE 20 MG/ML
10 INJECTION INTRAMUSCULAR; INTRAVENOUS EVERY 6 HOURS PRN
Status: DISCONTINUED | OUTPATIENT
Start: 2025-08-11 | End: 2025-08-17 | Stop reason: HOSPADM

## 2025-08-11 RX ORDER — MIDAZOLAM HYDROCHLORIDE 1 MG/ML
INJECTION, SOLUTION INTRAMUSCULAR; INTRAVENOUS
Status: DISCONTINUED | OUTPATIENT
Start: 2025-08-11 | End: 2025-08-11 | Stop reason: SDUPTHER

## 2025-08-11 RX ADMIN — SODIUM CHLORIDE, PRESERVATIVE FREE 10 ML: 5 INJECTION INTRAVENOUS at 21:19

## 2025-08-11 RX ADMIN — Medication 80 MCG: at 09:05

## 2025-08-11 RX ADMIN — FENTANYL CITRATE 50 MCG: 50 INJECTION INTRAMUSCULAR; INTRAVENOUS at 09:31

## 2025-08-11 RX ADMIN — OXYCODONE 10 MG: 5 TABLET ORAL at 21:17

## 2025-08-11 RX ADMIN — MANNITOL 350 ML: 20 INJECTION, SOLUTION INTRAVENOUS at 09:35

## 2025-08-11 RX ADMIN — NICARDIPINE HYDROCHLORIDE 5 MG/HR: 25 INJECTION INTRAVENOUS at 11:06

## 2025-08-11 RX ADMIN — WATER 2000 MG: 1 INJECTION INTRAMUSCULAR; INTRAVENOUS; SUBCUTANEOUS at 09:30

## 2025-08-11 RX ADMIN — SODIUM CHLORIDE, POTASSIUM CHLORIDE, SODIUM LACTATE AND CALCIUM CHLORIDE: 600; 310; 30; 20 INJECTION, SOLUTION INTRAVENOUS at 08:52

## 2025-08-11 RX ADMIN — MYCOPHENOLATE MOFETIL 1000 MG: 250 CAPSULE ORAL at 21:17

## 2025-08-11 RX ADMIN — MIDAZOLAM HYDROCHLORIDE 2 MG: 1 INJECTION, SOLUTION INTRAMUSCULAR; INTRAVENOUS at 08:40

## 2025-08-11 RX ADMIN — Medication 80 MCG: at 10:25

## 2025-08-11 RX ADMIN — LEVETIRACETAM 1000 MG: 100 INJECTION INTRAVENOUS at 09:26

## 2025-08-11 RX ADMIN — ROCURONIUM BROMIDE 10 MG: 10 INJECTION, SOLUTION INTRAVENOUS at 09:36

## 2025-08-11 RX ADMIN — DEXAMETHASONE SODIUM PHOSPHATE 4 MG: 4 INJECTION INTRA-ARTICULAR; INTRALESIONAL; INTRAMUSCULAR; INTRAVENOUS; SOFT TISSUE at 21:58

## 2025-08-11 RX ADMIN — MIDAZOLAM 1 MG: 1 INJECTION INTRAMUSCULAR; INTRAVENOUS at 08:56

## 2025-08-11 RX ADMIN — INSULIN LISPRO 2 UNITS: 100 INJECTION, SOLUTION INTRAVENOUS; SUBCUTANEOUS at 17:12

## 2025-08-11 RX ADMIN — SENNOSIDES AND DOCUSATE SODIUM 1 TABLET: 50; 8.6 TABLET ORAL at 13:35

## 2025-08-11 RX ADMIN — WATER 2000 MG: 1 INJECTION INTRAMUSCULAR; INTRAVENOUS; SUBCUTANEOUS at 19:54

## 2025-08-11 RX ADMIN — PROPOFOL 130 MG: 10 INJECTION, EMULSION INTRAVENOUS at 09:07

## 2025-08-11 RX ADMIN — INSULIN LISPRO 4 UNITS: 100 INJECTION, SOLUTION INTRAVENOUS; SUBCUTANEOUS at 21:16

## 2025-08-11 RX ADMIN — DEXAMETHASONE SODIUM PHOSPHATE 4 MG: 4 INJECTION INTRA-ARTICULAR; INTRALESIONAL; INTRAMUSCULAR; INTRAVENOUS; SOFT TISSUE at 16:15

## 2025-08-11 RX ADMIN — ACETAMINOPHEN 650 MG: 325 TABLET ORAL at 13:35

## 2025-08-11 RX ADMIN — DEXAMETHASONE SODIUM PHOSPHATE 10 MG: 4 INJECTION INTRA-ARTICULAR; INTRALESIONAL; INTRAMUSCULAR; INTRAVENOUS; SOFT TISSUE at 09:26

## 2025-08-11 RX ADMIN — PYRIDOSTIGMINE BROMIDE 60 MG: 60 TABLET ORAL at 21:19

## 2025-08-11 RX ADMIN — ROCURONIUM BROMIDE 40 MG: 10 INJECTION, SOLUTION INTRAVENOUS at 09:07

## 2025-08-11 RX ADMIN — METOPROLOL SUCCINATE 50 MG: 50 TABLET, EXTENDED RELEASE ORAL at 21:18

## 2025-08-11 RX ADMIN — Medication 80 MCG: at 09:50

## 2025-08-11 RX ADMIN — SUGAMMADEX 200 MG: 100 INJECTION, SOLUTION INTRAVENOUS at 11:17

## 2025-08-11 RX ADMIN — EZETIMIBE 10 MG: 10 TABLET ORAL at 13:35

## 2025-08-11 RX ADMIN — LIDOCAINE HYDROCHLORIDE 70 MG: 20 INJECTION, SOLUTION EPIDURAL; INFILTRATION; INTRACAUDAL; PERINEURAL at 09:05

## 2025-08-11 RX ADMIN — POTASSIUM CHLORIDE AND SODIUM CHLORIDE: 900; 150 INJECTION, SOLUTION INTRAVENOUS at 12:00

## 2025-08-11 RX ADMIN — OXYCODONE 10 MG: 5 TABLET ORAL at 14:14

## 2025-08-11 RX ADMIN — WATER 2000 MG: 1 INJECTION INTRAMUSCULAR; INTRAVENOUS; SUBCUTANEOUS at 13:35

## 2025-08-11 RX ADMIN — Medication 120 MCG: at 09:07

## 2025-08-11 RX ADMIN — PHENYLEPHRINE HYDROCHLORIDE 20 MCG/MIN: 10 INJECTION INTRAVENOUS at 09:48

## 2025-08-11 RX ADMIN — HYDROMORPHONE HYDROCHLORIDE 0.5 MG: 1 INJECTION, SOLUTION INTRAMUSCULAR; INTRAVENOUS; SUBCUTANEOUS at 11:04

## 2025-08-11 RX ADMIN — FENTANYL CITRATE 50 MCG: 50 INJECTION INTRAMUSCULAR; INTRAVENOUS at 09:05

## 2025-08-11 RX ADMIN — ONDANSETRON 4 MG: 2 INJECTION, SOLUTION INTRAMUSCULAR; INTRAVENOUS at 10:56

## 2025-08-11 RX ADMIN — MIDAZOLAM 1 MG: 1 INJECTION INTRAMUSCULAR; INTRAVENOUS at 11:25

## 2025-08-11 RX ADMIN — SODIUM CHLORIDE, SODIUM LACTATE, POTASSIUM CHLORIDE, CALCIUM CHLORIDE: 600; 310; 30; 20 INJECTION, SOLUTION INTRAVENOUS at 08:56

## 2025-08-11 RX ADMIN — SULFAMETHOXAZOLE AND TRIMETHOPRIM 2 TABLET: 800; 160 TABLET ORAL at 21:20

## 2025-08-11 RX ADMIN — LEVETIRACETAM 500 MG: 100 INJECTION INTRAVENOUS at 21:17

## 2025-08-11 RX ADMIN — HYDROMORPHONE HYDROCHLORIDE 0.5 MG: 1 INJECTION, SOLUTION INTRAMUSCULAR; INTRAVENOUS; SUBCUTANEOUS at 16:27

## 2025-08-11 RX ADMIN — ROSUVASTATIN 20 MG: 10 TABLET, FILM COATED ORAL at 21:18

## 2025-08-11 RX ADMIN — POTASSIUM CHLORIDE AND SODIUM CHLORIDE: 900; 150 INJECTION, SOLUTION INTRAVENOUS at 21:58

## 2025-08-11 RX ADMIN — MYCOPHENOLATE MOFETIL 1000 MG: 250 CAPSULE ORAL at 14:14

## 2025-08-11 RX ADMIN — PYRIDOSTIGMINE BROMIDE 60 MG: 60 TABLET ORAL at 13:35

## 2025-08-11 RX ADMIN — ACETAMINOPHEN 650 MG: 325 TABLET ORAL at 19:53

## 2025-08-11 RX ADMIN — Medication 200 MCG: at 09:10

## 2025-08-11 RX ADMIN — TRAZODONE HYDROCHLORIDE 50 MG: 50 TABLET ORAL at 21:18

## 2025-08-11 ASSESSMENT — PAIN DESCRIPTION - LOCATION: LOCATION: HEAD

## 2025-08-11 ASSESSMENT — PAIN - FUNCTIONAL ASSESSMENT
PAIN_FUNCTIONAL_ASSESSMENT: 0-10

## 2025-08-11 ASSESSMENT — PAIN SCALES - GENERAL
PAINLEVEL_OUTOF10: 7
PAINLEVEL_OUTOF10: 8

## 2025-08-11 ASSESSMENT — PAIN DESCRIPTION - ORIENTATION: ORIENTATION: RIGHT;POSTERIOR

## 2025-08-12 ENCOUNTER — APPOINTMENT (OUTPATIENT)
Facility: HOSPITAL | Age: 75
DRG: 025 | End: 2025-08-12
Attending: NEUROLOGICAL SURGERY
Payer: MEDICARE

## 2025-08-12 LAB
ANION GAP SERPL CALC-SCNC: 10 MMOL/L (ref 2–14)
ANION GAP SERPL CALC-SCNC: 10 MMOL/L (ref 2–14)
ANION GAP SERPL CALC-SCNC: 12 MMOL/L (ref 2–14)
ANION GAP SERPL CALC-SCNC: 9 MMOL/L (ref 2–14)
BASOPHILS # BLD: 0.07 K/UL (ref 0–0.1)
BASOPHILS NFR BLD: 0.2 % (ref 0–1)
BUN SERPL-MCNC: 18 MG/DL (ref 8–23)
BUN SERPL-MCNC: 20 MG/DL (ref 8–23)
BUN SERPL-MCNC: 22 MG/DL (ref 8–23)
BUN SERPL-MCNC: 23 MG/DL (ref 8–23)
BUN/CREAT SERPL: 20 (ref 12–20)
BUN/CREAT SERPL: 22 (ref 12–20)
BUN/CREAT SERPL: 24 (ref 12–20)
BUN/CREAT SERPL: 25 (ref 12–20)
CALCIUM SERPL-MCNC: 8.8 MG/DL (ref 8.8–10.2)
CALCIUM SERPL-MCNC: 9.3 MG/DL (ref 8.8–10.2)
CHLORIDE SERPL-SCNC: 100 MMOL/L (ref 98–107)
CHLORIDE SERPL-SCNC: 96 MMOL/L (ref 98–107)
CHLORIDE SERPL-SCNC: 99 MMOL/L (ref 98–107)
CHLORIDE SERPL-SCNC: 99 MMOL/L (ref 98–107)
CO2 SERPL-SCNC: 17 MMOL/L (ref 20–29)
CO2 SERPL-SCNC: 18 MMOL/L (ref 20–29)
CO2 SERPL-SCNC: 20 MMOL/L (ref 20–29)
CO2 SERPL-SCNC: 20 MMOL/L (ref 20–29)
CREAT SERPL-MCNC: 0.81 MG/DL (ref 0.7–1.2)
CREAT SERPL-MCNC: 0.89 MG/DL (ref 0.7–1.2)
CREAT SERPL-MCNC: 0.94 MG/DL (ref 0.7–1.2)
CREAT SERPL-MCNC: 1.02 MG/DL (ref 0.7–1.2)
DIFFERENTIAL METHOD BLD: ABNORMAL
EOSINOPHIL # BLD: 0 K/UL (ref 0–0.4)
EOSINOPHIL NFR BLD: 0 % (ref 0–7)
ERYTHROCYTE [DISTWIDTH] IN BLOOD BY AUTOMATED COUNT: 13.2 % (ref 11.5–14.5)
ERYTHROCYTE [DISTWIDTH] IN BLOOD BY AUTOMATED COUNT: 13.2 % (ref 11.5–14.5)
EST. AVERAGE GLUCOSE BLD GHB EST-MCNC: 146 MG/DL
GLUCOSE BLD STRIP.AUTO-MCNC: 199 MG/DL (ref 65–117)
GLUCOSE BLD STRIP.AUTO-MCNC: 212 MG/DL (ref 65–117)
GLUCOSE BLD STRIP.AUTO-MCNC: 229 MG/DL (ref 65–117)
GLUCOSE BLD STRIP.AUTO-MCNC: 232 MG/DL (ref 65–117)
GLUCOSE BLD STRIP.AUTO-MCNC: 293 MG/DL (ref 65–117)
GLUCOSE BLD STRIP.AUTO-MCNC: 294 MG/DL (ref 65–117)
GLUCOSE BLD STRIP.AUTO-MCNC: 355 MG/DL (ref 65–117)
GLUCOSE SERPL-MCNC: 185 MG/DL (ref 65–100)
GLUCOSE SERPL-MCNC: 198 MG/DL (ref 65–100)
GLUCOSE SERPL-MCNC: 216 MG/DL (ref 65–100)
GLUCOSE SERPL-MCNC: 231 MG/DL (ref 65–100)
HBA1C MFR BLD: 6.7 % (ref 4–5.6)
HCT VFR BLD AUTO: 37.1 % (ref 36.6–50.3)
HCT VFR BLD AUTO: 39.9 % (ref 36.6–50.3)
HGB BLD-MCNC: 12.7 G/DL (ref 12.1–17)
HGB BLD-MCNC: 13.8 G/DL (ref 12.1–17)
IMM GRANULOCYTES # BLD AUTO: 0.6 K/UL (ref 0–0.04)
IMM GRANULOCYTES NFR BLD AUTO: 1.8 % (ref 0–0.5)
LDH SERPL L TO P-CCNC: 258 U/L (ref 135–225)
LYMPHOCYTES # BLD: 0.43 K/UL (ref 0.8–3.5)
LYMPHOCYTES NFR BLD: 1.3 % (ref 12–49)
MAGNESIUM SERPL-MCNC: 2.1 MG/DL (ref 1.6–2.4)
MCH RBC QN AUTO: 29.8 PG (ref 26–34)
MCH RBC QN AUTO: 30 PG (ref 26–34)
MCHC RBC AUTO-ENTMCNC: 34.2 G/DL (ref 30–36.5)
MCHC RBC AUTO-ENTMCNC: 34.6 G/DL (ref 30–36.5)
MCV RBC AUTO: 86.2 FL (ref 80–99)
MCV RBC AUTO: 87.7 FL (ref 80–99)
MONOCYTES # BLD: 1.46 K/UL (ref 0–1)
MONOCYTES NFR BLD: 4.4 % (ref 5–13)
NEUTS SEG # BLD: 30.64 K/UL (ref 1.8–8)
NEUTS SEG NFR BLD: 92.3 % (ref 32–75)
NRBC # BLD: 0 K/UL (ref 0–0.01)
NRBC # BLD: 0 K/UL (ref 0–0.01)
NRBC BLD-RTO: 0 PER 100 WBC
NRBC BLD-RTO: 0 PER 100 WBC
OSMOLALITY SERPL: 280 MOSM/KG H2O
OSMOLALITY UR: 746 MOSM/KG H2O
PHOSPHATE SERPL-MCNC: 3.4 MG/DL (ref 2.5–4.5)
PLATELET # BLD AUTO: 438 K/UL (ref 150–400)
PLATELET # BLD AUTO: 537 K/UL (ref 150–400)
PMV BLD AUTO: 10.1 FL (ref 8.9–12.9)
PMV BLD AUTO: 9.2 FL (ref 8.9–12.9)
POTASSIUM SERPL-SCNC: 4.8 MMOL/L (ref 3.5–5.1)
POTASSIUM SERPL-SCNC: 5.2 MMOL/L (ref 3.5–5.1)
POTASSIUM SERPL-SCNC: 5.5 MMOL/L (ref 3.5–5.1)
POTASSIUM SERPL-SCNC: 5.9 MMOL/L (ref 3.5–5.1)
RBC # BLD AUTO: 4.23 M/UL (ref 4.1–5.7)
RBC # BLD AUTO: 4.63 M/UL (ref 4.1–5.7)
RBC MORPH BLD: ABNORMAL
SERVICE CMNT-IMP: ABNORMAL
SODIUM SERPL-SCNC: 126 MMOL/L (ref 136–145)
SODIUM SERPL-SCNC: 127 MMOL/L (ref 136–145)
SODIUM SERPL-SCNC: 128 MMOL/L (ref 136–145)
SODIUM SERPL-SCNC: 128 MMOL/L (ref 136–145)
SODIUM UR-SCNC: 173 MMOL/L
URATE SERPL-MCNC: 2.5 MG/DL (ref 3.4–7.2)
WBC # BLD AUTO: 33.2 K/UL (ref 4.1–11.1)
WBC # BLD AUTO: 35 K/UL (ref 4.1–11.1)

## 2025-08-12 PROCEDURE — 2500000003 HC RX 250 WO HCPCS: Performed by: NEUROLOGICAL SURGERY

## 2025-08-12 PROCEDURE — 82962 GLUCOSE BLOOD TEST: CPT

## 2025-08-12 PROCEDURE — 6360000002 HC RX W HCPCS: Performed by: NEUROLOGICAL SURGERY

## 2025-08-12 PROCEDURE — 83036 HEMOGLOBIN GLYCOSYLATED A1C: CPT

## 2025-08-12 PROCEDURE — 2000000000 HC ICU R&B

## 2025-08-12 PROCEDURE — 85025 COMPLETE CBC W/AUTO DIFF WBC: CPT

## 2025-08-12 PROCEDURE — 83735 ASSAY OF MAGNESIUM: CPT

## 2025-08-12 PROCEDURE — 87040 BLOOD CULTURE FOR BACTERIA: CPT

## 2025-08-12 PROCEDURE — 6360000002 HC RX W HCPCS

## 2025-08-12 PROCEDURE — 99024 POSTOP FOLLOW-UP VISIT: CPT | Performed by: NURSE PRACTITIONER

## 2025-08-12 PROCEDURE — 84550 ASSAY OF BLOOD/URIC ACID: CPT

## 2025-08-12 PROCEDURE — 6370000000 HC RX 637 (ALT 250 FOR IP)

## 2025-08-12 PROCEDURE — 83935 ASSAY OF URINE OSMOLALITY: CPT

## 2025-08-12 PROCEDURE — 83930 ASSAY OF BLOOD OSMOLALITY: CPT

## 2025-08-12 PROCEDURE — 84100 ASSAY OF PHOSPHORUS: CPT

## 2025-08-12 PROCEDURE — 80048 BASIC METABOLIC PNL TOTAL CA: CPT

## 2025-08-12 PROCEDURE — 97116 GAIT TRAINING THERAPY: CPT

## 2025-08-12 PROCEDURE — 85027 COMPLETE CBC AUTOMATED: CPT

## 2025-08-12 PROCEDURE — 6370000000 HC RX 637 (ALT 250 FOR IP): Performed by: NEUROLOGICAL SURGERY

## 2025-08-12 PROCEDURE — 83615 LACTATE (LD) (LDH) ENZYME: CPT

## 2025-08-12 PROCEDURE — 97530 THERAPEUTIC ACTIVITIES: CPT

## 2025-08-12 PROCEDURE — 70450 CT HEAD/BRAIN W/O DYE: CPT

## 2025-08-12 PROCEDURE — 6370000000 HC RX 637 (ALT 250 FOR IP): Performed by: NURSE PRACTITIONER

## 2025-08-12 PROCEDURE — 97165 OT EVAL LOW COMPLEX 30 MIN: CPT

## 2025-08-12 PROCEDURE — 97161 PT EVAL LOW COMPLEX 20 MIN: CPT

## 2025-08-12 PROCEDURE — 84300 ASSAY OF URINE SODIUM: CPT

## 2025-08-12 PROCEDURE — 2580000003 HC RX 258

## 2025-08-12 PROCEDURE — 94760 N-INVAS EAR/PLS OXIMETRY 1: CPT

## 2025-08-12 PROCEDURE — 6370000000 HC RX 637 (ALT 250 FOR IP): Performed by: PHYSICIAN ASSISTANT

## 2025-08-12 PROCEDURE — 2500000003 HC RX 250 WO HCPCS

## 2025-08-12 PROCEDURE — 2580000003 HC RX 258: Performed by: PHYSICIAN ASSISTANT

## 2025-08-12 PROCEDURE — 97535 SELF CARE MNGMENT TRAINING: CPT

## 2025-08-12 RX ORDER — CALCIUM GLUCONATE 20 MG/ML
1000 INJECTION, SOLUTION INTRAVENOUS ONCE
Status: DISCONTINUED | OUTPATIENT
Start: 2025-08-12 | End: 2025-08-12

## 2025-08-12 RX ORDER — DEXTROSE MONOHYDRATE 100 MG/ML
INJECTION, SOLUTION INTRAVENOUS CONTINUOUS PRN
Status: DISCONTINUED | OUTPATIENT
Start: 2025-08-12 | End: 2025-08-17 | Stop reason: HOSPADM

## 2025-08-12 RX ORDER — DEXTROSE MONOHYDRATE 100 MG/ML
INJECTION, SOLUTION INTRAVENOUS CONTINUOUS PRN
Status: DISCONTINUED | OUTPATIENT
Start: 2025-08-12 | End: 2025-08-13 | Stop reason: SDUPTHER

## 2025-08-12 RX ORDER — 0.9 % SODIUM CHLORIDE 0.9 %
500 INTRAVENOUS SOLUTION INTRAVENOUS ONCE
Status: COMPLETED | OUTPATIENT
Start: 2025-08-12 | End: 2025-08-12

## 2025-08-12 RX ADMIN — WATER 2000 MG: 1 INJECTION INTRAMUSCULAR; INTRAVENOUS; SUBCUTANEOUS at 03:31

## 2025-08-12 RX ADMIN — SODIUM CHLORIDE, PRESERVATIVE FREE 10 ML: 5 INJECTION INTRAVENOUS at 08:09

## 2025-08-12 RX ADMIN — ACETAMINOPHEN 650 MG: 325 TABLET ORAL at 01:42

## 2025-08-12 RX ADMIN — INSULIN LISPRO 4 UNITS: 100 INJECTION, SOLUTION INTRAVENOUS; SUBCUTANEOUS at 20:51

## 2025-08-12 RX ADMIN — ACETAMINOPHEN 650 MG: 325 TABLET ORAL at 13:00

## 2025-08-12 RX ADMIN — EZETIMIBE 10 MG: 10 TABLET ORAL at 08:08

## 2025-08-12 RX ADMIN — DEXAMETHASONE SODIUM PHOSPHATE 4 MG: 4 INJECTION INTRA-ARTICULAR; INTRALESIONAL; INTRAMUSCULAR; INTRAVENOUS; SOFT TISSUE at 21:41

## 2025-08-12 RX ADMIN — PYRIDOSTIGMINE BROMIDE 60 MG: 60 TABLET ORAL at 13:00

## 2025-08-12 RX ADMIN — SENNOSIDES AND DOCUSATE SODIUM 1 TABLET: 50; 8.6 TABLET ORAL at 20:52

## 2025-08-12 RX ADMIN — OXYCODONE 10 MG: 5 TABLET ORAL at 11:16

## 2025-08-12 RX ADMIN — SODIUM CHLORIDE, PRESERVATIVE FREE 10 ML: 5 INJECTION INTRAVENOUS at 20:52

## 2025-08-12 RX ADMIN — SENNOSIDES AND DOCUSATE SODIUM 1 TABLET: 50; 8.6 TABLET ORAL at 08:08

## 2025-08-12 RX ADMIN — INSULIN HUMAN 5 UNITS: 100 INJECTION, SOLUTION PARENTERAL at 07:15

## 2025-08-12 RX ADMIN — SODIUM CHLORIDE 500 ML: 0.9 INJECTION, SOLUTION INTRAVENOUS at 06:54

## 2025-08-12 RX ADMIN — LEVETIRACETAM 500 MG: 100 INJECTION INTRAVENOUS at 21:41

## 2025-08-12 RX ADMIN — DEXAMETHASONE SODIUM PHOSPHATE 4 MG: 4 INJECTION INTRA-ARTICULAR; INTRALESIONAL; INTRAMUSCULAR; INTRAVENOUS; SOFT TISSUE at 09:29

## 2025-08-12 RX ADMIN — METOPROLOL SUCCINATE 50 MG: 50 TABLET, EXTENDED RELEASE ORAL at 20:52

## 2025-08-12 RX ADMIN — ACETAMINOPHEN 650 MG: 325 TABLET ORAL at 07:00

## 2025-08-12 RX ADMIN — DEXTROSE 250 ML: 10 SOLUTION INTRAVENOUS at 15:06

## 2025-08-12 RX ADMIN — SODIUM ZIRCONIUM CYCLOSILICATE 10 G: 5 POWDER, FOR SUSPENSION ORAL at 17:37

## 2025-08-12 RX ADMIN — SODIUM ZIRCONIUM CYCLOSILICATE 10 G: 5 POWDER, FOR SUSPENSION ORAL at 07:19

## 2025-08-12 RX ADMIN — ROSUVASTATIN 20 MG: 10 TABLET, FILM COATED ORAL at 20:52

## 2025-08-12 RX ADMIN — INSULIN HUMAN 10 UNITS: 100 INJECTION, SOLUTION PARENTERAL at 15:08

## 2025-08-12 RX ADMIN — MYCOPHENOLATE MOFETIL 1000 MG: 250 CAPSULE ORAL at 08:08

## 2025-08-12 RX ADMIN — DEXAMETHASONE SODIUM PHOSPHATE 4 MG: 4 INJECTION INTRA-ARTICULAR; INTRALESIONAL; INTRAMUSCULAR; INTRAVENOUS; SOFT TISSUE at 15:25

## 2025-08-12 RX ADMIN — TAMSULOSIN HYDROCHLORIDE 0.4 MG: 0.4 CAPSULE ORAL at 08:08

## 2025-08-12 RX ADMIN — WATER 2000 MG: 1 INJECTION INTRAMUSCULAR; INTRAVENOUS; SUBCUTANEOUS at 09:29

## 2025-08-12 RX ADMIN — ACETAMINOPHEN 650 MG: 325 TABLET ORAL at 18:58

## 2025-08-12 RX ADMIN — MYCOPHENOLATE MOFETIL 1000 MG: 250 CAPSULE ORAL at 20:51

## 2025-08-12 RX ADMIN — PANTOPRAZOLE SODIUM 40 MG: 40 TABLET, DELAYED RELEASE ORAL at 07:00

## 2025-08-12 RX ADMIN — TRAZODONE HYDROCHLORIDE 50 MG: 50 TABLET ORAL at 20:52

## 2025-08-12 RX ADMIN — OXYCODONE 10 MG: 5 TABLET ORAL at 22:23

## 2025-08-12 RX ADMIN — OXYCODONE 10 MG: 5 TABLET ORAL at 17:36

## 2025-08-12 RX ADMIN — DEXAMETHASONE SODIUM PHOSPHATE 4 MG: 4 INJECTION INTRA-ARTICULAR; INTRALESIONAL; INTRAMUSCULAR; INTRAVENOUS; SOFT TISSUE at 03:32

## 2025-08-12 RX ADMIN — OXYCODONE 10 MG: 5 TABLET ORAL at 03:31

## 2025-08-12 RX ADMIN — PYRIDOSTIGMINE BROMIDE 60 MG: 60 TABLET ORAL at 20:52

## 2025-08-12 RX ADMIN — INSULIN LISPRO 2 UNITS: 100 INJECTION, SOLUTION INTRAVENOUS; SUBCUTANEOUS at 10:48

## 2025-08-12 RX ADMIN — LEVETIRACETAM 500 MG: 100 INJECTION INTRAVENOUS at 08:08

## 2025-08-12 RX ADMIN — INSULIN LISPRO 4 UNITS: 100 INJECTION, SOLUTION INTRAVENOUS; SUBCUTANEOUS at 16:25

## 2025-08-12 RX ADMIN — DEXTROSE 250 ML: 10 SOLUTION INTRAVENOUS at 07:19

## 2025-08-12 RX ADMIN — PYRIDOSTIGMINE BROMIDE 60 MG: 60 TABLET ORAL at 08:08

## 2025-08-12 ASSESSMENT — PAIN - FUNCTIONAL ASSESSMENT
PAIN_FUNCTIONAL_ASSESSMENT: 0-10

## 2025-08-12 ASSESSMENT — PAIN SCALES - GENERAL
PAINLEVEL_OUTOF10: 7
PAINLEVEL_OUTOF10: 6
PAINLEVEL_OUTOF10: 3
PAINLEVEL_OUTOF10: 3
PAINLEVEL_OUTOF10: 0
PAINLEVEL_OUTOF10: 6

## 2025-08-12 ASSESSMENT — PAIN DESCRIPTION - ORIENTATION
ORIENTATION: RIGHT;POSTERIOR
ORIENTATION: RIGHT

## 2025-08-12 ASSESSMENT — PAIN DESCRIPTION - LOCATION
LOCATION: HEAD

## 2025-08-12 ASSESSMENT — PAIN DESCRIPTION - DESCRIPTORS
DESCRIPTORS: ACHING
DESCRIPTORS: ACHING

## 2025-08-13 LAB
ALBUMIN SERPL-MCNC: 3.1 G/DL (ref 3.5–5.2)
ALBUMIN/GLOB SERPL: 1.1 (ref 1.1–2.2)
ALP SERPL-CCNC: 65 U/L (ref 40–129)
ALT SERPL-CCNC: 23 U/L (ref 10–50)
ANION GAP SERPL CALC-SCNC: 10 MMOL/L (ref 2–14)
AST SERPL-CCNC: 19 U/L (ref 10–50)
BASOPHILS # BLD: 0 K/UL (ref 0–0.1)
BASOPHILS NFR BLD: 0 % (ref 0–1)
BILIRUB SERPL-MCNC: 0.6 MG/DL (ref 0–1.2)
BUN SERPL-MCNC: 17 MG/DL (ref 8–23)
CALCIUM SERPL-MCNC: 9.1 MG/DL (ref 8.8–10.2)
CHLORIDE SERPL-SCNC: 98 MMOL/L (ref 98–107)
CO2 SERPL-SCNC: 21 MMOL/L (ref 20–29)
CORTIS AM PEAK SERPL-MCNC: 0.6 UG/DL (ref 4.8–19.5)
CORTIS SERPL-MCNC: 0.7 UG/DL
CORTIS SERPL-MCNC: 3 UG/DL
CORTIS SERPL-MCNC: 5.5 UG/DL
CREAT SERPL-MCNC: 0.79 MG/DL (ref 0.7–1.2)
DIFFERENTIAL METHOD BLD: ABNORMAL
EOSINOPHIL # BLD: 0.29 K/UL (ref 0–0.4)
EOSINOPHIL NFR BLD: 1 % (ref 0–7)
ERYTHROCYTE [DISTWIDTH] IN BLOOD BY AUTOMATED COUNT: 13.2 % (ref 11.5–14.5)
GLOBULIN SER CALC-MCNC: 2.9 G/DL (ref 2–4)
GLUCOSE BLD STRIP.AUTO-MCNC: 195 MG/DL (ref 65–117)
GLUCOSE BLD STRIP.AUTO-MCNC: 207 MG/DL (ref 65–117)
GLUCOSE BLD STRIP.AUTO-MCNC: 250 MG/DL (ref 65–117)
GLUCOSE BLD STRIP.AUTO-MCNC: 268 MG/DL (ref 65–117)
GLUCOSE SERPL-MCNC: 180 MG/DL (ref 65–100)
HCT VFR BLD AUTO: 38 % (ref 36.6–50.3)
HGB BLD-MCNC: 12.5 G/DL (ref 12.1–17)
IMM GRANULOCYTES # BLD AUTO: 0 K/UL
IMM GRANULOCYTES NFR BLD AUTO: 0 %
LYMPHOCYTES # BLD: 0 K/UL (ref 0.8–3.5)
LYMPHOCYTES NFR BLD: 0 % (ref 12–49)
MAGNESIUM SERPL-MCNC: 2.1 MG/DL (ref 1.6–2.4)
MCH RBC QN AUTO: 29.6 PG (ref 26–34)
MCHC RBC AUTO-ENTMCNC: 32.9 G/DL (ref 30–36.5)
MCV RBC AUTO: 89.8 FL (ref 80–99)
MONOCYTES # BLD: 0.87 K/UL (ref 0–1)
MONOCYTES NFR BLD: 3 % (ref 5–13)
MYELOCYTES NFR BLD MANUAL: 1 %
NEUTS SEG # BLD: 27.65 K/UL (ref 1.8–8)
NEUTS SEG NFR BLD: 95 % (ref 32–75)
NRBC # BLD: 0 K/UL (ref 0–0.01)
NRBC BLD-RTO: 0 PER 100 WBC
PHOSPHATE SERPL-MCNC: 3.1 MG/DL (ref 2.5–4.5)
PLATELET # BLD AUTO: 510 K/UL (ref 150–400)
PMV BLD AUTO: 9.6 FL (ref 8.9–12.9)
POTASSIUM SERPL-SCNC: 5.1 MMOL/L (ref 3.5–5.1)
POTASSIUM SERPL-SCNC: 5.3 MMOL/L (ref 3.5–5.1)
PROT SERPL-MCNC: 6 G/DL (ref 6.4–8.3)
RBC # BLD AUTO: 4.23 M/UL (ref 4.1–5.7)
RBC MORPH BLD: ABNORMAL
SERVICE CMNT-IMP: ABNORMAL
SODIUM SERPL-SCNC: 129 MMOL/L (ref 136–145)
TSH, 3RD GENERATION: 0.35 UIU/ML (ref 0.27–4.2)
WBC # BLD AUTO: 29.1 K/UL (ref 4.1–11.1)

## 2025-08-13 PROCEDURE — 6370000000 HC RX 637 (ALT 250 FOR IP): Performed by: NURSE PRACTITIONER

## 2025-08-13 PROCEDURE — 97535 SELF CARE MNGMENT TRAINING: CPT

## 2025-08-13 PROCEDURE — 82962 GLUCOSE BLOOD TEST: CPT

## 2025-08-13 PROCEDURE — 6360000002 HC RX W HCPCS: Performed by: NEUROLOGICAL SURGERY

## 2025-08-13 PROCEDURE — 80053 COMPREHEN METABOLIC PANEL: CPT

## 2025-08-13 PROCEDURE — 97112 NEUROMUSCULAR REEDUCATION: CPT

## 2025-08-13 PROCEDURE — 84100 ASSAY OF PHOSPHORUS: CPT

## 2025-08-13 PROCEDURE — 2500000003 HC RX 250 WO HCPCS

## 2025-08-13 PROCEDURE — 2580000003 HC RX 258: Performed by: PHYSICIAN ASSISTANT

## 2025-08-13 PROCEDURE — 82533 TOTAL CORTISOL: CPT

## 2025-08-13 PROCEDURE — 6370000000 HC RX 637 (ALT 250 FOR IP): Performed by: NEUROLOGICAL SURGERY

## 2025-08-13 PROCEDURE — 97116 GAIT TRAINING THERAPY: CPT

## 2025-08-13 PROCEDURE — 6370000000 HC RX 637 (ALT 250 FOR IP)

## 2025-08-13 PROCEDURE — 6360000002 HC RX W HCPCS

## 2025-08-13 PROCEDURE — 85025 COMPLETE CBC W/AUTO DIFF WBC: CPT

## 2025-08-13 PROCEDURE — 2060000000 HC ICU INTERMEDIATE R&B

## 2025-08-13 PROCEDURE — 84443 ASSAY THYROID STIM HORMONE: CPT

## 2025-08-13 PROCEDURE — 94760 N-INVAS EAR/PLS OXIMETRY 1: CPT

## 2025-08-13 PROCEDURE — 83735 ASSAY OF MAGNESIUM: CPT

## 2025-08-13 PROCEDURE — 84132 ASSAY OF SERUM POTASSIUM: CPT

## 2025-08-13 PROCEDURE — 2500000003 HC RX 250 WO HCPCS: Performed by: NEUROLOGICAL SURGERY

## 2025-08-13 RX ORDER — INSULIN LISPRO 100 [IU]/ML
0-16 INJECTION, SOLUTION INTRAVENOUS; SUBCUTANEOUS
Status: DISCONTINUED | OUTPATIENT
Start: 2025-08-13 | End: 2025-08-17 | Stop reason: HOSPADM

## 2025-08-13 RX ORDER — SODIUM CHLORIDE 9 MG/ML
INJECTION, SOLUTION INTRAVENOUS CONTINUOUS
Status: DISCONTINUED | OUTPATIENT
Start: 2025-08-13 | End: 2025-08-13

## 2025-08-13 RX ORDER — COSYNTROPIN 0.25 MG/ML
250 INJECTION, POWDER, FOR SOLUTION INTRAMUSCULAR; INTRAVENOUS ONCE
Status: COMPLETED | OUTPATIENT
Start: 2025-08-13 | End: 2025-08-13

## 2025-08-13 RX ADMIN — METOPROLOL SUCCINATE 50 MG: 50 TABLET, EXTENDED RELEASE ORAL at 21:12

## 2025-08-13 RX ADMIN — ACETAMINOPHEN 650 MG: 325 TABLET ORAL at 06:44

## 2025-08-13 RX ADMIN — INSULIN LISPRO 4 UNITS: 100 INJECTION, SOLUTION INTRAVENOUS; SUBCUTANEOUS at 17:40

## 2025-08-13 RX ADMIN — MYCOPHENOLATE MOFETIL 1000 MG: 250 CAPSULE ORAL at 21:14

## 2025-08-13 RX ADMIN — INSULIN LISPRO 4 UNITS: 100 INJECTION, SOLUTION INTRAVENOUS; SUBCUTANEOUS at 10:33

## 2025-08-13 RX ADMIN — INSULIN LISPRO 8 UNITS: 100 INJECTION, SOLUTION INTRAVENOUS; SUBCUTANEOUS at 21:10

## 2025-08-13 RX ADMIN — DEXAMETHASONE SODIUM PHOSPHATE 4 MG: 4 INJECTION INTRA-ARTICULAR; INTRALESIONAL; INTRAMUSCULAR; INTRAVENOUS; SOFT TISSUE at 21:11

## 2025-08-13 RX ADMIN — ROSUVASTATIN 20 MG: 10 TABLET, FILM COATED ORAL at 21:11

## 2025-08-13 RX ADMIN — SODIUM CHLORIDE: 0.9 INJECTION, SOLUTION INTRAVENOUS at 06:58

## 2025-08-13 RX ADMIN — DEXAMETHASONE SODIUM PHOSPHATE 4 MG: 4 INJECTION INTRA-ARTICULAR; INTRALESIONAL; INTRAMUSCULAR; INTRAVENOUS; SOFT TISSUE at 04:14

## 2025-08-13 RX ADMIN — BISACODYL 5 MG: 5 TABLET, COATED ORAL at 07:43

## 2025-08-13 RX ADMIN — PYRIDOSTIGMINE BROMIDE 60 MG: 60 TABLET ORAL at 13:50

## 2025-08-13 RX ADMIN — OXYCODONE 5 MG: 5 TABLET ORAL at 21:14

## 2025-08-13 RX ADMIN — SENNOSIDES AND DOCUSATE SODIUM 1 TABLET: 50; 8.6 TABLET ORAL at 07:43

## 2025-08-13 RX ADMIN — MYCOPHENOLATE MOFETIL 1000 MG: 250 CAPSULE ORAL at 07:43

## 2025-08-13 RX ADMIN — LEVETIRACETAM 500 MG: 100 INJECTION INTRAVENOUS at 21:11

## 2025-08-13 RX ADMIN — COSYNTROPIN 250 MCG: 0.25 INJECTION, POWDER, LYOPHILIZED, FOR SOLUTION INTRAMUSCULAR; INTRAVENOUS at 09:00

## 2025-08-13 RX ADMIN — DEXAMETHASONE SODIUM PHOSPHATE 4 MG: 4 INJECTION INTRA-ARTICULAR; INTRALESIONAL; INTRAMUSCULAR; INTRAVENOUS; SOFT TISSUE at 15:40

## 2025-08-13 RX ADMIN — ACETAMINOPHEN 650 MG: 325 TABLET ORAL at 18:42

## 2025-08-13 RX ADMIN — OXYCODONE 5 MG: 5 TABLET ORAL at 15:43

## 2025-08-13 RX ADMIN — ACETAMINOPHEN 650 MG: 325 TABLET ORAL at 01:27

## 2025-08-13 RX ADMIN — LEVETIRACETAM 500 MG: 100 INJECTION INTRAVENOUS at 07:43

## 2025-08-13 RX ADMIN — DEXAMETHASONE SODIUM PHOSPHATE 4 MG: 4 INJECTION INTRA-ARTICULAR; INTRALESIONAL; INTRAMUSCULAR; INTRAVENOUS; SOFT TISSUE at 10:33

## 2025-08-13 RX ADMIN — ACETAMINOPHEN 650 MG: 325 TABLET ORAL at 13:50

## 2025-08-13 RX ADMIN — PANTOPRAZOLE SODIUM 40 MG: 40 TABLET, DELAYED RELEASE ORAL at 06:44

## 2025-08-13 RX ADMIN — PYRIDOSTIGMINE BROMIDE 60 MG: 60 TABLET ORAL at 21:15

## 2025-08-13 RX ADMIN — OXYCODONE 10 MG: 5 TABLET ORAL at 10:06

## 2025-08-13 RX ADMIN — PYRIDOSTIGMINE BROMIDE 60 MG: 60 TABLET ORAL at 07:43

## 2025-08-13 RX ADMIN — OXYCODONE 10 MG: 5 TABLET ORAL at 04:27

## 2025-08-13 RX ADMIN — EZETIMIBE 10 MG: 10 TABLET ORAL at 07:43

## 2025-08-13 RX ADMIN — TAMSULOSIN HYDROCHLORIDE 0.4 MG: 0.4 CAPSULE ORAL at 07:43

## 2025-08-13 RX ADMIN — SODIUM CHLORIDE, PRESERVATIVE FREE 10 ML: 5 INJECTION INTRAVENOUS at 07:43

## 2025-08-13 RX ADMIN — INSULIN LISPRO 2 UNITS: 100 INJECTION, SOLUTION INTRAVENOUS; SUBCUTANEOUS at 06:44

## 2025-08-13 RX ADMIN — WATER 2000 MG: 1 INJECTION INTRAMUSCULAR; INTRAVENOUS; SUBCUTANEOUS at 07:42

## 2025-08-13 ASSESSMENT — PAIN DESCRIPTION - DESCRIPTORS: DESCRIPTORS: ACHING;CRAMPING

## 2025-08-13 ASSESSMENT — PAIN SCALES - GENERAL
PAINLEVEL_OUTOF10: 4
PAINLEVEL_OUTOF10: 0
PAINLEVEL_OUTOF10: 0
PAINLEVEL_OUTOF10: 6
PAINLEVEL_OUTOF10: 5
PAINLEVEL_OUTOF10: 7
PAINLEVEL_OUTOF10: 0

## 2025-08-13 ASSESSMENT — PAIN - FUNCTIONAL ASSESSMENT
PAIN_FUNCTIONAL_ASSESSMENT: 0-10

## 2025-08-13 ASSESSMENT — PAIN DESCRIPTION - LOCATION
LOCATION: PERINEUM
LOCATION: HEAD

## 2025-08-14 LAB
ANION GAP SERPL CALC-SCNC: 10 MMOL/L (ref 2–14)
BASOPHILS # BLD: 0.02 K/UL (ref 0–0.1)
BASOPHILS NFR BLD: 0.1 % (ref 0–1)
BUN SERPL-MCNC: 22 MG/DL (ref 8–23)
BUN/CREAT SERPL: 36 (ref 12–20)
CALCIUM SERPL-MCNC: 9.1 MG/DL (ref 8.8–10.2)
CHLORIDE SERPL-SCNC: 97 MMOL/L (ref 98–107)
CO2 SERPL-SCNC: 22 MMOL/L (ref 20–29)
COMMENT:: NORMAL
CREAT SERPL-MCNC: 0.62 MG/DL (ref 0.7–1.2)
DIFFERENTIAL METHOD BLD: ABNORMAL
EOSINOPHIL # BLD: 0.01 K/UL (ref 0–0.4)
EOSINOPHIL NFR BLD: 0.1 % (ref 0–7)
ERYTHROCYTE [DISTWIDTH] IN BLOOD BY AUTOMATED COUNT: 13.3 % (ref 11.5–14.5)
GLUCOSE BLD STRIP.AUTO-MCNC: 192 MG/DL (ref 65–117)
GLUCOSE BLD STRIP.AUTO-MCNC: 204 MG/DL (ref 65–117)
GLUCOSE BLD STRIP.AUTO-MCNC: 219 MG/DL (ref 65–117)
GLUCOSE BLD STRIP.AUTO-MCNC: 244 MG/DL (ref 65–117)
GLUCOSE SERPL-MCNC: 169 MG/DL (ref 65–100)
HCT VFR BLD AUTO: 38.1 % (ref 36.6–50.3)
HGB BLD-MCNC: 12.5 G/DL (ref 12.1–17)
IMM GRANULOCYTES # BLD AUTO: 0.24 K/UL (ref 0–0.04)
IMM GRANULOCYTES NFR BLD AUTO: 1.3 % (ref 0–0.5)
LYMPHOCYTES # BLD: 0.4 K/UL (ref 0.8–3.5)
LYMPHOCYTES NFR BLD: 2.1 % (ref 12–49)
MCH RBC QN AUTO: 29.6 PG (ref 26–34)
MCHC RBC AUTO-ENTMCNC: 32.8 G/DL (ref 30–36.5)
MCV RBC AUTO: 90.1 FL (ref 80–99)
MONOCYTES # BLD: 1.1 K/UL (ref 0–1)
MONOCYTES NFR BLD: 5.9 % (ref 5–13)
NEUTS SEG # BLD: 16.96 K/UL (ref 1.8–8)
NEUTS SEG NFR BLD: 90.5 % (ref 32–75)
NRBC # BLD: 0 K/UL (ref 0–0.01)
NRBC BLD-RTO: 0 PER 100 WBC
PLATELET # BLD AUTO: 480 K/UL (ref 150–400)
PMV BLD AUTO: 9.9 FL (ref 8.9–12.9)
POTASSIUM SERPL-SCNC: 5.3 MMOL/L (ref 3.5–5.1)
RBC # BLD AUTO: 4.23 M/UL (ref 4.1–5.7)
SERVICE CMNT-IMP: ABNORMAL
SODIUM SERPL-SCNC: 130 MMOL/L (ref 136–145)
SPECIMEN HOLD: NORMAL
WBC # BLD AUTO: 18.7 K/UL (ref 4.1–11.1)

## 2025-08-14 PROCEDURE — 97116 GAIT TRAINING THERAPY: CPT

## 2025-08-14 PROCEDURE — 6370000000 HC RX 637 (ALT 250 FOR IP)

## 2025-08-14 PROCEDURE — 99024 POSTOP FOLLOW-UP VISIT: CPT | Performed by: NURSE PRACTITIONER

## 2025-08-14 PROCEDURE — 80048 BASIC METABOLIC PNL TOTAL CA: CPT

## 2025-08-14 PROCEDURE — 85025 COMPLETE CBC W/AUTO DIFF WBC: CPT

## 2025-08-14 PROCEDURE — 2500000003 HC RX 250 WO HCPCS

## 2025-08-14 PROCEDURE — 2500000003 HC RX 250 WO HCPCS: Performed by: NEUROLOGICAL SURGERY

## 2025-08-14 PROCEDURE — 82962 GLUCOSE BLOOD TEST: CPT

## 2025-08-14 PROCEDURE — 6370000000 HC RX 637 (ALT 250 FOR IP): Performed by: NEUROLOGICAL SURGERY

## 2025-08-14 PROCEDURE — 97530 THERAPEUTIC ACTIVITIES: CPT

## 2025-08-14 PROCEDURE — 6370000000 HC RX 637 (ALT 250 FOR IP): Performed by: NURSE PRACTITIONER

## 2025-08-14 PROCEDURE — 6370000000 HC RX 637 (ALT 250 FOR IP): Performed by: HOSPITALIST

## 2025-08-14 PROCEDURE — 51702 INSERT TEMP BLADDER CATH: CPT

## 2025-08-14 PROCEDURE — 6360000002 HC RX W HCPCS: Performed by: NEUROLOGICAL SURGERY

## 2025-08-14 PROCEDURE — 6360000002 HC RX W HCPCS

## 2025-08-14 PROCEDURE — 2060000000 HC ICU INTERMEDIATE R&B

## 2025-08-14 RX ORDER — DEXAMETHASONE 4 MG/1
4 TABLET ORAL EVERY 8 HOURS SCHEDULED
Status: COMPLETED | OUTPATIENT
Start: 2025-08-14 | End: 2025-08-15

## 2025-08-14 RX ORDER — DEXAMETHASONE 4 MG/1
4 TABLET ORAL EVERY 12 HOURS SCHEDULED
Status: DISCONTINUED | OUTPATIENT
Start: 2025-08-16 | End: 2025-08-17 | Stop reason: HOSPADM

## 2025-08-14 RX ORDER — DEXAMETHASONE 0.5 MG/1
2 TABLET ORAL DAILY
Status: DISCONTINUED | OUTPATIENT
Start: 2025-08-20 | End: 2025-08-17 | Stop reason: HOSPADM

## 2025-08-14 RX ORDER — DEXAMETHASONE 0.5 MG/1
2 TABLET ORAL DAILY
Status: DISCONTINUED | OUTPATIENT
Start: 2025-08-20 | End: 2025-08-14

## 2025-08-14 RX ORDER — DEXAMETHASONE 4 MG/1
4 TABLET ORAL EVERY 8 HOURS SCHEDULED
Status: DISCONTINUED | OUTPATIENT
Start: 2025-08-14 | End: 2025-08-14

## 2025-08-14 RX ORDER — DEXAMETHASONE 0.5 MG/1
2 TABLET ORAL EVERY 12 HOURS SCHEDULED
Status: DISCONTINUED | OUTPATIENT
Start: 2025-08-18 | End: 2025-08-14

## 2025-08-14 RX ORDER — DEXAMETHASONE 4 MG/1
4 TABLET ORAL EVERY 12 HOURS SCHEDULED
Status: DISCONTINUED | OUTPATIENT
Start: 2025-08-16 | End: 2025-08-14

## 2025-08-14 RX ORDER — TROSPIUM CHLORIDE 20 MG/1
20 TABLET, FILM COATED ORAL
Status: DISCONTINUED | OUTPATIENT
Start: 2025-08-14 | End: 2025-08-15

## 2025-08-14 RX ORDER — DEXAMETHASONE 0.5 MG/1
2 TABLET ORAL EVERY 12 HOURS SCHEDULED
Status: DISCONTINUED | OUTPATIENT
Start: 2025-08-18 | End: 2025-08-17 | Stop reason: HOSPADM

## 2025-08-14 RX ORDER — LEVETIRACETAM 500 MG/1
500 TABLET ORAL 2 TIMES DAILY
Status: DISCONTINUED | OUTPATIENT
Start: 2025-08-14 | End: 2025-08-17 | Stop reason: HOSPADM

## 2025-08-14 RX ADMIN — SODIUM CHLORIDE, PRESERVATIVE FREE 10 ML: 5 INJECTION INTRAVENOUS at 09:10

## 2025-08-14 RX ADMIN — DEXAMETHASONE SODIUM PHOSPHATE 4 MG: 4 INJECTION INTRA-ARTICULAR; INTRALESIONAL; INTRAMUSCULAR; INTRAVENOUS; SOFT TISSUE at 03:11

## 2025-08-14 RX ADMIN — PANTOPRAZOLE SODIUM 40 MG: 40 TABLET, DELAYED RELEASE ORAL at 06:39

## 2025-08-14 RX ADMIN — OXYCODONE 5 MG: 5 TABLET ORAL at 22:07

## 2025-08-14 RX ADMIN — INSULIN LISPRO 4 UNITS: 100 INJECTION, SOLUTION INTRAVENOUS; SUBCUTANEOUS at 17:33

## 2025-08-14 RX ADMIN — DEXAMETHASONE 4 MG: 4 TABLET ORAL at 12:13

## 2025-08-14 RX ADMIN — EZETIMIBE 10 MG: 10 TABLET ORAL at 09:09

## 2025-08-14 RX ADMIN — ACETAMINOPHEN 650 MG: 325 TABLET ORAL at 06:39

## 2025-08-14 RX ADMIN — ACETAMINOPHEN 650 MG: 325 TABLET ORAL at 03:11

## 2025-08-14 RX ADMIN — ACETAMINOPHEN 650 MG: 325 TABLET ORAL at 19:29

## 2025-08-14 RX ADMIN — PYRIDOSTIGMINE BROMIDE 60 MG: 60 TABLET ORAL at 22:06

## 2025-08-14 RX ADMIN — WATER 2000 MG: 1 INJECTION INTRAMUSCULAR; INTRAVENOUS; SUBCUTANEOUS at 09:09

## 2025-08-14 RX ADMIN — INSULIN LISPRO 4 UNITS: 100 INJECTION, SOLUTION INTRAVENOUS; SUBCUTANEOUS at 09:10

## 2025-08-14 RX ADMIN — MYCOPHENOLATE MOFETIL 1000 MG: 250 CAPSULE ORAL at 09:10

## 2025-08-14 RX ADMIN — PYRIDOSTIGMINE BROMIDE 60 MG: 60 TABLET ORAL at 13:30

## 2025-08-14 RX ADMIN — INSULIN LISPRO 4 UNITS: 100 INJECTION, SOLUTION INTRAVENOUS; SUBCUTANEOUS at 11:54

## 2025-08-14 RX ADMIN — TROSPIUM CHLORIDE 20 MG: 20 TABLET, FILM COATED ORAL at 17:33

## 2025-08-14 RX ADMIN — DEXAMETHASONE 4 MG: 4 TABLET ORAL at 22:07

## 2025-08-14 RX ADMIN — INSULIN LISPRO 4 UNITS: 100 INJECTION, SOLUTION INTRAVENOUS; SUBCUTANEOUS at 22:08

## 2025-08-14 RX ADMIN — SODIUM ZIRCONIUM CYCLOSILICATE 10 G: 10 POWDER, FOR SUSPENSION ORAL at 09:10

## 2025-08-14 RX ADMIN — LEVETIRACETAM 500 MG: 500 TABLET, FILM COATED ORAL at 22:07

## 2025-08-14 RX ADMIN — TAMSULOSIN HYDROCHLORIDE 0.4 MG: 0.4 CAPSULE ORAL at 09:09

## 2025-08-14 RX ADMIN — MYCOPHENOLATE MOFETIL 1000 MG: 250 CAPSULE ORAL at 22:06

## 2025-08-14 RX ADMIN — ACETAMINOPHEN 650 MG: 325 TABLET ORAL at 13:29

## 2025-08-14 RX ADMIN — ROSUVASTATIN 20 MG: 10 TABLET, FILM COATED ORAL at 22:07

## 2025-08-14 RX ADMIN — PYRIDOSTIGMINE BROMIDE 60 MG: 60 TABLET ORAL at 09:09

## 2025-08-14 RX ADMIN — LEVETIRACETAM 500 MG: 100 INJECTION INTRAVENOUS at 09:09

## 2025-08-14 RX ADMIN — TROSPIUM CHLORIDE 20 MG: 20 TABLET, FILM COATED ORAL at 09:17

## 2025-08-14 ASSESSMENT — PAIN SCALES - GENERAL: PAINLEVEL_OUTOF10: 6

## 2025-08-14 ASSESSMENT — PAIN - FUNCTIONAL ASSESSMENT
PAIN_FUNCTIONAL_ASSESSMENT: 0-10

## 2025-08-15 ENCOUNTER — ANESTHESIA (OUTPATIENT)
Facility: HOSPITAL | Age: 75
End: 2025-08-15
Payer: MEDICARE

## 2025-08-15 ENCOUNTER — APPOINTMENT (OUTPATIENT)
Facility: HOSPITAL | Age: 75
DRG: 025 | End: 2025-08-15
Attending: NEUROLOGICAL SURGERY
Payer: MEDICARE

## 2025-08-15 ENCOUNTER — ANESTHESIA EVENT (OUTPATIENT)
Facility: HOSPITAL | Age: 75
End: 2025-08-15
Payer: MEDICARE

## 2025-08-15 LAB
ANION GAP SERPL CALC-SCNC: 12 MMOL/L (ref 2–14)
APPEARANCE UR: CLEAR
BACTERIA URNS QL MICRO: NEGATIVE /HPF
BASOPHILS # BLD: 0.02 K/UL (ref 0–0.1)
BASOPHILS NFR BLD: 0.1 % (ref 0–1)
BILIRUB UR QL: NEGATIVE
BUN SERPL-MCNC: 28 MG/DL (ref 8–23)
BUN SERPL-MCNC: 28 MG/DL (ref 8–23)
BUN SERPL-MCNC: 29 MG/DL (ref 8–23)
BUN/CREAT SERPL: 43 (ref 12–20)
BUN/CREAT SERPL: 43 (ref 12–20)
BUN/CREAT SERPL: 44 (ref 12–20)
CALCIUM SERPL-MCNC: 9.1 MG/DL (ref 8.8–10.2)
CALCIUM SERPL-MCNC: 9.3 MG/DL (ref 8.8–10.2)
CALCIUM SERPL-MCNC: 9.4 MG/DL (ref 8.8–10.2)
CHLORIDE SERPL-SCNC: 96 MMOL/L (ref 98–107)
CHLORIDE SERPL-SCNC: 97 MMOL/L (ref 98–107)
CHLORIDE SERPL-SCNC: 97 MMOL/L (ref 98–107)
CO2 SERPL-SCNC: 21 MMOL/L (ref 20–29)
CO2 SERPL-SCNC: 21 MMOL/L (ref 20–29)
CO2 SERPL-SCNC: 22 MMOL/L (ref 20–29)
COLOR UR: ABNORMAL
CREAT SERPL-MCNC: 0.63 MG/DL (ref 0.7–1.2)
CREAT SERPL-MCNC: 0.64 MG/DL (ref 0.7–1.2)
CREAT SERPL-MCNC: 0.68 MG/DL (ref 0.7–1.2)
CREAT UR-MCNC: 34.2 MG/DL (ref 39–259)
DIFFERENTIAL METHOD BLD: ABNORMAL
EOSINOPHIL # BLD: 0.02 K/UL (ref 0–0.4)
EOSINOPHIL NFR BLD: 0.1 % (ref 0–7)
EPITH CASTS URNS QL MICRO: ABNORMAL /LPF
ERYTHROCYTE [DISTWIDTH] IN BLOOD BY AUTOMATED COUNT: 13.3 % (ref 11.5–14.5)
GLUCOSE BLD STRIP.AUTO-MCNC: 173 MG/DL (ref 65–117)
GLUCOSE BLD STRIP.AUTO-MCNC: 254 MG/DL (ref 65–117)
GLUCOSE BLD STRIP.AUTO-MCNC: 280 MG/DL (ref 65–117)
GLUCOSE SERPL-MCNC: 165 MG/DL (ref 65–100)
GLUCOSE SERPL-MCNC: 175 MG/DL (ref 65–100)
GLUCOSE SERPL-MCNC: 198 MG/DL (ref 65–100)
GLUCOSE UR STRIP.AUTO-MCNC: >1000 MG/DL
HCT VFR BLD AUTO: 38.2 % (ref 36.6–50.3)
HGB BLD-MCNC: 13.3 G/DL (ref 12.1–17)
HGB UR QL STRIP: ABNORMAL
HYALINE CASTS URNS QL MICRO: ABNORMAL /LPF (ref 0–5)
IMM GRANULOCYTES # BLD AUTO: 0.22 K/UL (ref 0–0.04)
IMM GRANULOCYTES NFR BLD AUTO: 1.3 % (ref 0–0.5)
KETONES UR QL STRIP.AUTO: NEGATIVE MG/DL
LEUKOCYTE ESTERASE UR QL STRIP.AUTO: ABNORMAL
LYMPHOCYTES # BLD: 0.55 K/UL (ref 0.8–3.5)
LYMPHOCYTES NFR BLD: 3.3 % (ref 12–49)
MCH RBC QN AUTO: 30 PG (ref 26–34)
MCHC RBC AUTO-ENTMCNC: 34.8 G/DL (ref 30–36.5)
MCV RBC AUTO: 86.2 FL (ref 80–99)
MONOCYTES # BLD: 0.91 K/UL (ref 0–1)
MONOCYTES NFR BLD: 5.5 % (ref 5–13)
NEUTS SEG # BLD: 14.88 K/UL (ref 1.8–8)
NEUTS SEG NFR BLD: 89.7 % (ref 32–75)
NITRITE UR QL STRIP.AUTO: NEGATIVE
NRBC # BLD: 0 K/UL (ref 0–0.01)
NRBC BLD-RTO: 0 PER 100 WBC
PH UR STRIP: 6 (ref 5–8)
PLATELET # BLD AUTO: 518 K/UL (ref 150–400)
PMV BLD AUTO: 9.5 FL (ref 8.9–12.9)
POTASSIUM SERPL-SCNC: 5.5 MMOL/L (ref 3.5–5.1)
POTASSIUM SERPL-SCNC: 5.5 MMOL/L (ref 3.5–5.1)
POTASSIUM SERPL-SCNC: 6.2 MMOL/L (ref 3.5–5.1)
POTASSIUM UR-SCNC: 40 MMOL/L
PROT UR STRIP-MCNC: 100 MG/DL
RBC # BLD AUTO: 4.43 M/UL (ref 4.1–5.7)
RBC #/AREA URNS HPF: >100 /HPF (ref 0–5)
RBC MORPH BLD: ABNORMAL
SERVICE CMNT-IMP: ABNORMAL
SODIUM SERPL-SCNC: 129 MMOL/L (ref 136–145)
SODIUM SERPL-SCNC: 130 MMOL/L (ref 136–145)
SODIUM SERPL-SCNC: 131 MMOL/L (ref 136–145)
SODIUM UR-SCNC: 114 MMOL/L
SP GR UR REFRACTOMETRY: 1.02 (ref 1–1.03)
UROBILINOGEN UR QL STRIP.AUTO: 1 EU/DL (ref 0.2–1)
WBC # BLD AUTO: 16.6 K/UL (ref 4.1–11.1)
WBC URNS QL MICRO: ABNORMAL /HPF (ref 0–4)

## 2025-08-15 PROCEDURE — 00B70ZZ EXCISION OF CEREBRAL HEMISPHERE, OPEN APPROACH: ICD-10-PCS | Performed by: NEUROLOGICAL SURGERY

## 2025-08-15 PROCEDURE — 6370000000 HC RX 637 (ALT 250 FOR IP): Performed by: INTERNAL MEDICINE

## 2025-08-15 PROCEDURE — 85025 COMPLETE CBC W/AUTO DIFF WBC: CPT

## 2025-08-15 PROCEDURE — 80048 BASIC METABOLIC PNL TOTAL CA: CPT

## 2025-08-15 PROCEDURE — 6370000000 HC RX 637 (ALT 250 FOR IP): Performed by: NEUROLOGICAL SURGERY

## 2025-08-15 PROCEDURE — 2580000003 HC RX 258: Performed by: ANESTHESIOLOGY

## 2025-08-15 PROCEDURE — 84133 ASSAY OF URINE POTASSIUM: CPT

## 2025-08-15 PROCEDURE — 2500000003 HC RX 250 WO HCPCS: Performed by: UROLOGY

## 2025-08-15 PROCEDURE — 99024 POSTOP FOLLOW-UP VISIT: CPT | Performed by: NURSE PRACTITIONER

## 2025-08-15 PROCEDURE — 6360000002 HC RX W HCPCS: Performed by: UROLOGY

## 2025-08-15 PROCEDURE — 6360000002 HC RX W HCPCS: Performed by: ANESTHESIOLOGY

## 2025-08-15 PROCEDURE — C2617 STENT, NON-COR, TEM W/O DEL: HCPCS | Performed by: UROLOGY

## 2025-08-15 PROCEDURE — 2709999900 HC NON-CHARGEABLE SUPPLY: Performed by: UROLOGY

## 2025-08-15 PROCEDURE — 94760 N-INVAS EAR/PLS OXIMETRY 1: CPT

## 2025-08-15 PROCEDURE — 3600000014 HC SURGERY LEVEL 4 ADDTL 15MIN: Performed by: UROLOGY

## 2025-08-15 PROCEDURE — 6370000000 HC RX 637 (ALT 250 FOR IP): Performed by: UROLOGY

## 2025-08-15 PROCEDURE — 7100000000 HC PACU RECOVERY - FIRST 15 MIN: Performed by: UROLOGY

## 2025-08-15 PROCEDURE — 7100000001 HC PACU RECOVERY - ADDTL 15 MIN: Performed by: UROLOGY

## 2025-08-15 PROCEDURE — 82962 GLUCOSE BLOOD TEST: CPT

## 2025-08-15 PROCEDURE — 3700000001 HC ADD 15 MINUTES (ANESTHESIA): Performed by: UROLOGY

## 2025-08-15 PROCEDURE — 0FD48ZX EXTRACTION OF GALLBLADDER, VIA NATURAL OR ARTIFICIAL OPENING ENDOSCOPIC, DIAGNOSTIC: ICD-10-PCS | Performed by: UROLOGY

## 2025-08-15 PROCEDURE — 00U10JZ SUPPLEMENT CEREBRAL MENINGES WITH SYNTHETIC SUBSTITUTE, OPEN APPROACH: ICD-10-PCS | Performed by: NEUROLOGICAL SURGERY

## 2025-08-15 PROCEDURE — 6370000000 HC RX 637 (ALT 250 FOR IP): Performed by: NURSE PRACTITIONER

## 2025-08-15 PROCEDURE — 0TP98DZ REMOVAL OF INTRALUMINAL DEVICE FROM URETER, VIA NATURAL OR ARTIFICIAL OPENING ENDOSCOPIC: ICD-10-PCS | Performed by: UROLOGY

## 2025-08-15 PROCEDURE — 3600000004 HC SURGERY LEVEL 4 BASE: Performed by: UROLOGY

## 2025-08-15 PROCEDURE — 0T778DZ DILATION OF LEFT URETER WITH INTRALUMINAL DEVICE, VIA NATURAL OR ARTIFICIAL OPENING ENDOSCOPIC: ICD-10-PCS | Performed by: UROLOGY

## 2025-08-15 PROCEDURE — 81001 URINALYSIS AUTO W/SCOPE: CPT

## 2025-08-15 PROCEDURE — 6360000002 HC RX W HCPCS: Performed by: NEUROLOGICAL SURGERY

## 2025-08-15 PROCEDURE — 3700000000 HC ANESTHESIA ATTENDED CARE: Performed by: UROLOGY

## 2025-08-15 PROCEDURE — 2500000003 HC RX 250 WO HCPCS: Performed by: HOSPITALIST

## 2025-08-15 PROCEDURE — 83935 ASSAY OF URINE OSMOLALITY: CPT

## 2025-08-15 PROCEDURE — BT1F1ZZ FLUOROSCOPY OF LEFT KIDNEY, URETER AND BLADDER USING LOW OSMOLAR CONTRAST: ICD-10-PCS | Performed by: UROLOGY

## 2025-08-15 PROCEDURE — 6360000002 HC RX W HCPCS: Performed by: HOSPITALIST

## 2025-08-15 PROCEDURE — 84300 ASSAY OF URINE SODIUM: CPT

## 2025-08-15 PROCEDURE — 82570 ASSAY OF URINE CREATININE: CPT

## 2025-08-15 PROCEDURE — 2060000000 HC ICU INTERMEDIATE R&B

## 2025-08-15 DEVICE — STENT URET 6FR L26CM PERCFLX HYDR+ TAPR TIP GRAD: Type: IMPLANTABLE DEVICE | Site: URETER | Status: FUNCTIONAL

## 2025-08-15 RX ORDER — SODIUM CHLORIDE 0.9 % (FLUSH) 0.9 %
5-40 SYRINGE (ML) INJECTION EVERY 12 HOURS SCHEDULED
Status: DISCONTINUED | OUTPATIENT
Start: 2025-08-15 | End: 2025-08-15 | Stop reason: HOSPADM

## 2025-08-15 RX ORDER — SODIUM CHLORIDE 0.9 % (FLUSH) 0.9 %
5-40 SYRINGE (ML) INJECTION PRN
Status: DISCONTINUED | OUTPATIENT
Start: 2025-08-15 | End: 2025-08-15 | Stop reason: HOSPADM

## 2025-08-15 RX ORDER — PROCHLORPERAZINE EDISYLATE 5 MG/ML
5 INJECTION INTRAMUSCULAR; INTRAVENOUS
Status: DISCONTINUED | OUTPATIENT
Start: 2025-08-15 | End: 2025-08-15 | Stop reason: HOSPADM

## 2025-08-15 RX ORDER — FENTANYL CITRATE 50 UG/ML
INJECTION, SOLUTION INTRAMUSCULAR; INTRAVENOUS
Status: DISCONTINUED | OUTPATIENT
Start: 2025-08-15 | End: 2025-08-15 | Stop reason: SDUPTHER

## 2025-08-15 RX ORDER — FENTANYL CITRATE 50 UG/ML
25 INJECTION, SOLUTION INTRAMUSCULAR; INTRAVENOUS EVERY 5 MIN PRN
Status: DISCONTINUED | OUTPATIENT
Start: 2025-08-15 | End: 2025-08-15 | Stop reason: HOSPADM

## 2025-08-15 RX ORDER — ONDANSETRON 2 MG/ML
INJECTION INTRAMUSCULAR; INTRAVENOUS
Status: DISCONTINUED | OUTPATIENT
Start: 2025-08-15 | End: 2025-08-15 | Stop reason: SDUPTHER

## 2025-08-15 RX ORDER — FENTANYL CITRATE 0.05 MG/ML
100 INJECTION, SOLUTION INTRAMUSCULAR; INTRAVENOUS
Status: DISCONTINUED | OUTPATIENT
Start: 2025-08-15 | End: 2025-08-15 | Stop reason: HOSPADM

## 2025-08-15 RX ORDER — OXYCODONE HYDROCHLORIDE 5 MG/1
5 TABLET ORAL EVERY 4 HOURS PRN
Qty: 30 TABLET | Refills: 0 | Status: SHIPPED | OUTPATIENT
Start: 2025-08-15 | End: 2025-08-22

## 2025-08-15 RX ORDER — SODIUM CHLORIDE 9 MG/ML
INJECTION, SOLUTION INTRAVENOUS PRN
Status: DISCONTINUED | OUTPATIENT
Start: 2025-08-15 | End: 2025-08-15 | Stop reason: HOSPADM

## 2025-08-15 RX ORDER — MIDAZOLAM HYDROCHLORIDE 2 MG/2ML
2 INJECTION, SOLUTION INTRAMUSCULAR; INTRAVENOUS PRN
Status: DISCONTINUED | OUTPATIENT
Start: 2025-08-15 | End: 2025-08-15 | Stop reason: HOSPADM

## 2025-08-15 RX ORDER — SODIUM CHLORIDE, SODIUM LACTATE, POTASSIUM CHLORIDE, CALCIUM CHLORIDE 600; 310; 30; 20 MG/100ML; MG/100ML; MG/100ML; MG/100ML
INJECTION, SOLUTION INTRAVENOUS
Status: DISCONTINUED | OUTPATIENT
Start: 2025-08-15 | End: 2025-08-15 | Stop reason: SDUPTHER

## 2025-08-15 RX ORDER — LIDOCAINE HYDROCHLORIDE 20 MG/ML
INJECTION, SOLUTION EPIDURAL; INFILTRATION; INTRACAUDAL; PERINEURAL
Status: DISCONTINUED | OUTPATIENT
Start: 2025-08-15 | End: 2025-08-15 | Stop reason: SDUPTHER

## 2025-08-15 RX ORDER — OXYCODONE HYDROCHLORIDE 5 MG/1
5 TABLET ORAL
Status: DISCONTINUED | OUTPATIENT
Start: 2025-08-15 | End: 2025-08-15 | Stop reason: HOSPADM

## 2025-08-15 RX ORDER — ACETAMINOPHEN 500 MG
1000 TABLET ORAL ONCE
Status: DISCONTINUED | OUTPATIENT
Start: 2025-08-15 | End: 2025-08-15 | Stop reason: HOSPADM

## 2025-08-15 RX ORDER — HYDROMORPHONE HYDROCHLORIDE 1 MG/ML
0.5 INJECTION, SOLUTION INTRAMUSCULAR; INTRAVENOUS; SUBCUTANEOUS EVERY 5 MIN PRN
Status: DISCONTINUED | OUTPATIENT
Start: 2025-08-15 | End: 2025-08-15 | Stop reason: HOSPADM

## 2025-08-15 RX ORDER — HYDRALAZINE HYDROCHLORIDE 20 MG/ML
10 INJECTION INTRAMUSCULAR; INTRAVENOUS
Status: DISCONTINUED | OUTPATIENT
Start: 2025-08-15 | End: 2025-08-15 | Stop reason: HOSPADM

## 2025-08-15 RX ORDER — DEXAMETHASONE SODIUM PHOSPHATE 4 MG/ML
INJECTION, SOLUTION INTRA-ARTICULAR; INTRALESIONAL; INTRAMUSCULAR; INTRAVENOUS; SOFT TISSUE
Status: DISCONTINUED | OUTPATIENT
Start: 2025-08-15 | End: 2025-08-15 | Stop reason: SDUPTHER

## 2025-08-15 RX ORDER — LEVETIRACETAM 500 MG/1
500 TABLET ORAL 2 TIMES DAILY
Qty: 60 TABLET | Refills: 1 | Status: SHIPPED | OUTPATIENT
Start: 2025-08-15

## 2025-08-15 RX ORDER — LIDOCAINE HYDROCHLORIDE 10 MG/ML
1 INJECTION, SOLUTION EPIDURAL; INFILTRATION; INTRACAUDAL; PERINEURAL
Status: DISCONTINUED | OUTPATIENT
Start: 2025-08-15 | End: 2025-08-15 | Stop reason: HOSPADM

## 2025-08-15 RX ORDER — PROPOFOL 10 MG/ML
INJECTION, EMULSION INTRAVENOUS
Status: DISCONTINUED | OUTPATIENT
Start: 2025-08-15 | End: 2025-08-15 | Stop reason: SDUPTHER

## 2025-08-15 RX ORDER — DEXAMETHASONE 2 MG/1
TABLET ORAL
Qty: 20 TABLET | Refills: 0 | Status: SHIPPED | OUTPATIENT
Start: 2025-08-15 | End: 2025-08-25

## 2025-08-15 RX ORDER — SODIUM CHLORIDE, SODIUM LACTATE, POTASSIUM CHLORIDE, CALCIUM CHLORIDE 600; 310; 30; 20 MG/100ML; MG/100ML; MG/100ML; MG/100ML
INJECTION, SOLUTION INTRAVENOUS CONTINUOUS
Status: DISCONTINUED | OUTPATIENT
Start: 2025-08-15 | End: 2025-08-15 | Stop reason: HOSPADM

## 2025-08-15 RX ORDER — ONDANSETRON 2 MG/ML
4 INJECTION INTRAMUSCULAR; INTRAVENOUS
Status: DISCONTINUED | OUTPATIENT
Start: 2025-08-15 | End: 2025-08-15 | Stop reason: HOSPADM

## 2025-08-15 RX ORDER — MIDAZOLAM HYDROCHLORIDE 1 MG/ML
INJECTION, SOLUTION INTRAMUSCULAR; INTRAVENOUS
Status: DISCONTINUED | OUTPATIENT
Start: 2025-08-15 | End: 2025-08-15 | Stop reason: SDUPTHER

## 2025-08-15 RX ADMIN — LIDOCAINE HYDROCHLORIDE 70 MG: 20 INJECTION, SOLUTION EPIDURAL; INFILTRATION; INTRACAUDAL; PERINEURAL at 10:56

## 2025-08-15 RX ADMIN — PYRIDOSTIGMINE BROMIDE 60 MG: 60 TABLET ORAL at 20:46

## 2025-08-15 RX ADMIN — TRAZODONE HYDROCHLORIDE 50 MG: 50 TABLET ORAL at 20:44

## 2025-08-15 RX ADMIN — ACETAMINOPHEN 650 MG: 325 TABLET ORAL at 01:12

## 2025-08-15 RX ADMIN — ONDANSETRON 4 MG: 2 INJECTION, SOLUTION INTRAMUSCULAR; INTRAVENOUS at 11:51

## 2025-08-15 RX ADMIN — SENNOSIDES AND DOCUSATE SODIUM 1 TABLET: 50; 8.6 TABLET ORAL at 20:47

## 2025-08-15 RX ADMIN — OXYCODONE 5 MG: 5 TABLET ORAL at 06:01

## 2025-08-15 RX ADMIN — SODIUM CHLORIDE, SODIUM LACTATE, POTASSIUM CHLORIDE, AND CALCIUM CHLORIDE: 600; 310; 30; 20 INJECTION, SOLUTION INTRAVENOUS at 10:51

## 2025-08-15 RX ADMIN — PYRIDOSTIGMINE BROMIDE 60 MG: 60 TABLET ORAL at 14:26

## 2025-08-15 RX ADMIN — OXYCODONE 5 MG: 5 TABLET ORAL at 14:26

## 2025-08-15 RX ADMIN — SODIUM CHLORIDE, PRESERVATIVE FREE 10 ML: 5 INJECTION INTRAVENOUS at 20:46

## 2025-08-15 RX ADMIN — ACETAMINOPHEN 650 MG: 325 TABLET ORAL at 06:01

## 2025-08-15 RX ADMIN — FENTANYL CITRATE 50 MCG: 50 INJECTION INTRAMUSCULAR; INTRAVENOUS at 11:18

## 2025-08-15 RX ADMIN — TROSPIUM CHLORIDE 20 MG: 20 TABLET, FILM COATED ORAL at 06:01

## 2025-08-15 RX ADMIN — DEXAMETHASONE SODIUM PHOSPHATE 4 MG: 4 INJECTION INTRA-ARTICULAR; INTRALESIONAL; INTRAMUSCULAR; INTRAVENOUS; SOFT TISSUE at 10:56

## 2025-08-15 RX ADMIN — DEXAMETHASONE 4 MG: 4 TABLET ORAL at 14:28

## 2025-08-15 RX ADMIN — ACETAMINOPHEN 650 MG: 325 TABLET ORAL at 14:27

## 2025-08-15 RX ADMIN — MIDAZOLAM 2 MG: 1 INJECTION INTRAMUSCULAR; INTRAVENOUS at 10:47

## 2025-08-15 RX ADMIN — MYCOPHENOLATE MOFETIL 1000 MG: 250 CAPSULE ORAL at 20:46

## 2025-08-15 RX ADMIN — SODIUM ZIRCONIUM CYCLOSILICATE 10 G: 10 POWDER, FOR SUSPENSION ORAL at 18:49

## 2025-08-15 RX ADMIN — INSULIN LISPRO 8 UNITS: 100 INJECTION, SOLUTION INTRAVENOUS; SUBCUTANEOUS at 20:50

## 2025-08-15 RX ADMIN — METOPROLOL SUCCINATE 50 MG: 50 TABLET, EXTENDED RELEASE ORAL at 20:44

## 2025-08-15 RX ADMIN — DEXAMETHASONE 4 MG: 4 TABLET ORAL at 20:46

## 2025-08-15 RX ADMIN — INSULIN LISPRO 8 UNITS: 100 INJECTION, SOLUTION INTRAVENOUS; SUBCUTANEOUS at 18:48

## 2025-08-15 RX ADMIN — OXYCODONE 5 MG: 5 TABLET ORAL at 21:23

## 2025-08-15 RX ADMIN — PROPOFOL 80 MG: 10 INJECTION, EMULSION INTRAVENOUS at 10:56

## 2025-08-15 RX ADMIN — TRAZODONE HYDROCHLORIDE 50 MG: 50 TABLET ORAL at 01:12

## 2025-08-15 RX ADMIN — WATER 2000 MG: 1 INJECTION INTRAMUSCULAR; INTRAVENOUS; SUBCUTANEOUS at 11:02

## 2025-08-15 RX ADMIN — ROSUVASTATIN 20 MG: 10 TABLET, FILM COATED ORAL at 20:44

## 2025-08-15 RX ADMIN — DEXAMETHASONE 4 MG: 4 TABLET ORAL at 06:02

## 2025-08-15 RX ADMIN — PANTOPRAZOLE SODIUM 40 MG: 40 TABLET, DELAYED RELEASE ORAL at 06:01

## 2025-08-15 RX ADMIN — LEVETIRACETAM 500 MG: 500 TABLET, FILM COATED ORAL at 20:44

## 2025-08-15 RX ADMIN — ACETAMINOPHEN 650 MG: 325 TABLET ORAL at 18:49

## 2025-08-15 RX ADMIN — MYCOPHENOLATE MOFETIL 1000 MG: 250 CAPSULE ORAL at 09:00

## 2025-08-15 RX ADMIN — FENTANYL CITRATE 50 MCG: 50 INJECTION INTRAMUSCULAR; INTRAVENOUS at 10:56

## 2025-08-15 ASSESSMENT — PAIN - FUNCTIONAL ASSESSMENT
PAIN_FUNCTIONAL_ASSESSMENT: 0-10

## 2025-08-15 ASSESSMENT — PAIN SCALES - GENERAL
PAINLEVEL_OUTOF10: 0
PAINLEVEL_OUTOF10: 4

## 2025-08-15 ASSESSMENT — ENCOUNTER SYMPTOMS: SHORTNESS OF BREATH: 1

## 2025-08-16 LAB
ANION GAP SERPL CALC-SCNC: 10 MMOL/L (ref 2–14)
BUN SERPL-MCNC: 26 MG/DL (ref 8–23)
BUN/CREAT SERPL: 45 (ref 12–20)
CALCIUM SERPL-MCNC: 9.2 MG/DL (ref 8.8–10.2)
CHLORIDE SERPL-SCNC: 96 MMOL/L (ref 98–107)
CO2 SERPL-SCNC: 24 MMOL/L (ref 20–29)
CREAT SERPL-MCNC: 0.57 MG/DL (ref 0.7–1.2)
GLUCOSE BLD STRIP.AUTO-MCNC: 177 MG/DL (ref 65–117)
GLUCOSE BLD STRIP.AUTO-MCNC: 191 MG/DL (ref 65–117)
GLUCOSE BLD STRIP.AUTO-MCNC: 192 MG/DL (ref 65–117)
GLUCOSE BLD STRIP.AUTO-MCNC: 235 MG/DL (ref 65–117)
GLUCOSE SERPL-MCNC: 134 MG/DL (ref 65–100)
OSMOLALITY UR: 738 MOSM/KG H2O
POTASSIUM SERPL-SCNC: 5.2 MMOL/L (ref 3.5–5.1)
SERVICE CMNT-IMP: ABNORMAL
SODIUM SERPL-SCNC: 130 MMOL/L (ref 136–145)

## 2025-08-16 PROCEDURE — 6360000002 HC RX W HCPCS: Performed by: UROLOGY

## 2025-08-16 PROCEDURE — 6370000000 HC RX 637 (ALT 250 FOR IP): Performed by: UROLOGY

## 2025-08-16 PROCEDURE — 2060000000 HC ICU INTERMEDIATE R&B

## 2025-08-16 PROCEDURE — 2500000003 HC RX 250 WO HCPCS: Performed by: UROLOGY

## 2025-08-16 PROCEDURE — 82962 GLUCOSE BLOOD TEST: CPT

## 2025-08-16 PROCEDURE — 80048 BASIC METABOLIC PNL TOTAL CA: CPT

## 2025-08-16 RX ADMIN — MYCOPHENOLATE MOFETIL 1000 MG: 250 CAPSULE ORAL at 20:49

## 2025-08-16 RX ADMIN — LEVETIRACETAM 500 MG: 500 TABLET, FILM COATED ORAL at 10:01

## 2025-08-16 RX ADMIN — OXYCODONE 5 MG: 5 TABLET ORAL at 13:59

## 2025-08-16 RX ADMIN — SENNOSIDES AND DOCUSATE SODIUM 1 TABLET: 50; 8.6 TABLET ORAL at 10:00

## 2025-08-16 RX ADMIN — EZETIMIBE 10 MG: 10 TABLET ORAL at 10:01

## 2025-08-16 RX ADMIN — LEVETIRACETAM 500 MG: 500 TABLET, FILM COATED ORAL at 20:49

## 2025-08-16 RX ADMIN — ACETAMINOPHEN 650 MG: 325 TABLET ORAL at 02:04

## 2025-08-16 RX ADMIN — WATER 2000 MG: 1 INJECTION INTRAMUSCULAR; INTRAVENOUS; SUBCUTANEOUS at 10:00

## 2025-08-16 RX ADMIN — ACETAMINOPHEN 650 MG: 325 TABLET ORAL at 13:00

## 2025-08-16 RX ADMIN — INSULIN LISPRO 4 UNITS: 100 INJECTION, SOLUTION INTRAVENOUS; SUBCUTANEOUS at 13:00

## 2025-08-16 RX ADMIN — BISACODYL 5 MG: 5 TABLET, COATED ORAL at 16:35

## 2025-08-16 RX ADMIN — OXYCODONE 5 MG: 5 TABLET ORAL at 03:45

## 2025-08-16 RX ADMIN — DEXAMETHASONE 4 MG: 4 TABLET ORAL at 10:01

## 2025-08-16 RX ADMIN — ACETAMINOPHEN 650 MG: 325 TABLET ORAL at 05:41

## 2025-08-16 RX ADMIN — BISACODYL 5 MG: 5 TABLET, COATED ORAL at 05:46

## 2025-08-16 RX ADMIN — PYRIDOSTIGMINE BROMIDE 60 MG: 60 TABLET ORAL at 10:00

## 2025-08-16 RX ADMIN — TAMSULOSIN HYDROCHLORIDE 0.4 MG: 0.4 CAPSULE ORAL at 10:01

## 2025-08-16 RX ADMIN — PANTOPRAZOLE SODIUM 40 MG: 40 TABLET, DELAYED RELEASE ORAL at 05:41

## 2025-08-16 RX ADMIN — ACETAMINOPHEN 650 MG: 325 TABLET ORAL at 19:14

## 2025-08-16 RX ADMIN — OXYCODONE 5 MG: 5 TABLET ORAL at 22:16

## 2025-08-16 RX ADMIN — METOPROLOL SUCCINATE 50 MG: 50 TABLET, EXTENDED RELEASE ORAL at 20:50

## 2025-08-16 RX ADMIN — PYRIDOSTIGMINE BROMIDE 60 MG: 60 TABLET ORAL at 20:50

## 2025-08-16 RX ADMIN — TRAZODONE HYDROCHLORIDE 50 MG: 50 TABLET ORAL at 20:50

## 2025-08-16 RX ADMIN — MYCOPHENOLATE MOFETIL 1000 MG: 250 CAPSULE ORAL at 10:02

## 2025-08-16 RX ADMIN — SODIUM CHLORIDE, PRESERVATIVE FREE 10 ML: 5 INJECTION INTRAVENOUS at 20:54

## 2025-08-16 RX ADMIN — INSULIN LISPRO 4 UNITS: 100 INJECTION, SOLUTION INTRAVENOUS; SUBCUTANEOUS at 09:58

## 2025-08-16 RX ADMIN — ROSUVASTATIN 20 MG: 10 TABLET, FILM COATED ORAL at 20:49

## 2025-08-16 RX ADMIN — SENNOSIDES AND DOCUSATE SODIUM 1 TABLET: 50; 8.6 TABLET ORAL at 20:49

## 2025-08-16 RX ADMIN — INSULIN LISPRO 4 UNITS: 100 INJECTION, SOLUTION INTRAVENOUS; SUBCUTANEOUS at 16:36

## 2025-08-16 RX ADMIN — PYRIDOSTIGMINE BROMIDE 60 MG: 60 TABLET ORAL at 14:01

## 2025-08-16 RX ADMIN — DEXAMETHASONE 4 MG: 4 TABLET ORAL at 20:50

## 2025-08-16 ASSESSMENT — PAIN - FUNCTIONAL ASSESSMENT
PAIN_FUNCTIONAL_ASSESSMENT: 0-10

## 2025-08-16 ASSESSMENT — PAIN DESCRIPTION - LOCATION
LOCATION: PENIS
LOCATION: PENIS

## 2025-08-16 ASSESSMENT — PAIN SCALES - GENERAL
PAINLEVEL_OUTOF10: 3
PAINLEVEL_OUTOF10: 0
PAINLEVEL_OUTOF10: 0
PAINLEVEL_OUTOF10: 6
PAINLEVEL_OUTOF10: 3
PAINLEVEL_OUTOF10: 0
PAINLEVEL_OUTOF10: 0

## 2025-08-16 ASSESSMENT — PAIN DESCRIPTION - DESCRIPTORS
DESCRIPTORS: BURNING
DESCRIPTORS: BURNING

## 2025-08-16 ASSESSMENT — PAIN DESCRIPTION - ORIENTATION: ORIENTATION: ANTERIOR

## 2025-08-17 VITALS
HEIGHT: 65 IN | TEMPERATURE: 97.9 F | OXYGEN SATURATION: 97 % | SYSTOLIC BLOOD PRESSURE: 123 MMHG | RESPIRATION RATE: 13 BRPM | WEIGHT: 160.05 LBS | HEART RATE: 61 BPM | DIASTOLIC BLOOD PRESSURE: 75 MMHG | BODY MASS INDEX: 26.67 KG/M2

## 2025-08-17 LAB
ANION GAP SERPL CALC-SCNC: 10 MMOL/L (ref 2–14)
BACTERIA SPEC CULT: NORMAL
BACTERIA SPEC CULT: NORMAL
BUN SERPL-MCNC: 29 MG/DL (ref 8–23)
BUN/CREAT SERPL: 39 (ref 12–20)
CALCIUM SERPL-MCNC: 9 MG/DL (ref 8.8–10.2)
CHLORIDE SERPL-SCNC: 96 MMOL/L (ref 98–107)
CO2 SERPL-SCNC: 23 MMOL/L (ref 20–29)
CREAT SERPL-MCNC: 0.75 MG/DL (ref 0.7–1.2)
ERYTHROCYTE [DISTWIDTH] IN BLOOD BY AUTOMATED COUNT: 13.2 % (ref 11.5–14.5)
GLUCOSE BLD STRIP.AUTO-MCNC: 170 MG/DL (ref 65–117)
GLUCOSE BLD STRIP.AUTO-MCNC: 304 MG/DL (ref 65–117)
GLUCOSE SERPL-MCNC: 198 MG/DL (ref 65–100)
HCT VFR BLD AUTO: 39.2 % (ref 36.6–50.3)
HGB BLD-MCNC: 13.3 G/DL (ref 12.1–17)
MCH RBC QN AUTO: 30 PG (ref 26–34)
MCHC RBC AUTO-ENTMCNC: 33.9 G/DL (ref 30–36.5)
MCV RBC AUTO: 88.3 FL (ref 80–99)
NRBC # BLD: 0 K/UL (ref 0–0.01)
NRBC BLD-RTO: 0 PER 100 WBC
PLATELET # BLD AUTO: 489 K/UL (ref 150–400)
PMV BLD AUTO: 9.1 FL (ref 8.9–12.9)
POTASSIUM SERPL-SCNC: 5.2 MMOL/L (ref 3.5–5.1)
RBC # BLD AUTO: 4.44 M/UL (ref 4.1–5.7)
SERVICE CMNT-IMP: ABNORMAL
SERVICE CMNT-IMP: ABNORMAL
SERVICE CMNT-IMP: NORMAL
SERVICE CMNT-IMP: NORMAL
SODIUM SERPL-SCNC: 130 MMOL/L (ref 136–145)
WBC # BLD AUTO: 15 K/UL (ref 4.1–11.1)

## 2025-08-17 PROCEDURE — 85027 COMPLETE CBC AUTOMATED: CPT

## 2025-08-17 PROCEDURE — 6370000000 HC RX 637 (ALT 250 FOR IP): Performed by: UROLOGY

## 2025-08-17 PROCEDURE — 99024 POSTOP FOLLOW-UP VISIT: CPT | Performed by: NURSE PRACTITIONER

## 2025-08-17 PROCEDURE — 82962 GLUCOSE BLOOD TEST: CPT

## 2025-08-17 PROCEDURE — 6360000002 HC RX W HCPCS: Performed by: UROLOGY

## 2025-08-17 PROCEDURE — 2500000003 HC RX 250 WO HCPCS: Performed by: UROLOGY

## 2025-08-17 PROCEDURE — 80048 BASIC METABOLIC PNL TOTAL CA: CPT

## 2025-08-17 RX ADMIN — OXYCODONE 5 MG: 5 TABLET ORAL at 10:40

## 2025-08-17 RX ADMIN — TAMSULOSIN HYDROCHLORIDE 0.4 MG: 0.4 CAPSULE ORAL at 10:40

## 2025-08-17 RX ADMIN — LEVETIRACETAM 500 MG: 500 TABLET, FILM COATED ORAL at 10:41

## 2025-08-17 RX ADMIN — SENNOSIDES AND DOCUSATE SODIUM 1 TABLET: 50; 8.6 TABLET ORAL at 10:40

## 2025-08-17 RX ADMIN — ACETAMINOPHEN 650 MG: 325 TABLET ORAL at 06:56

## 2025-08-17 RX ADMIN — SODIUM CHLORIDE, PRESERVATIVE FREE 10 ML: 5 INJECTION INTRAVENOUS at 10:41

## 2025-08-17 RX ADMIN — PYRIDOSTIGMINE BROMIDE 60 MG: 60 TABLET ORAL at 10:41

## 2025-08-17 RX ADMIN — EZETIMIBE 10 MG: 10 TABLET ORAL at 10:41

## 2025-08-17 RX ADMIN — INSULIN LISPRO 0 UNITS: 100 INJECTION, SOLUTION INTRAVENOUS; SUBCUTANEOUS at 08:00

## 2025-08-17 RX ADMIN — ACETAMINOPHEN 650 MG: 325 TABLET ORAL at 01:45

## 2025-08-17 RX ADMIN — ACETAMINOPHEN 650 MG: 325 TABLET ORAL at 15:15

## 2025-08-17 RX ADMIN — PANTOPRAZOLE SODIUM 40 MG: 40 TABLET, DELAYED RELEASE ORAL at 06:56

## 2025-08-17 RX ADMIN — PYRIDOSTIGMINE BROMIDE 60 MG: 60 TABLET ORAL at 15:15

## 2025-08-17 RX ADMIN — MYCOPHENOLATE MOFETIL 1000 MG: 250 CAPSULE ORAL at 10:41

## 2025-08-17 RX ADMIN — DEXAMETHASONE 4 MG: 4 TABLET ORAL at 10:41

## 2025-08-17 ASSESSMENT — PAIN - FUNCTIONAL ASSESSMENT: PAIN_FUNCTIONAL_ASSESSMENT: 0-10

## 2025-08-17 ASSESSMENT — PAIN SCALES - GENERAL
PAINLEVEL_OUTOF10: 5
PAINLEVEL_OUTOF10: 0

## 2025-09-03 ENCOUNTER — OFFICE VISIT (OUTPATIENT)
Age: 75
End: 2025-09-03
Payer: MEDICARE

## 2025-09-03 ENCOUNTER — HOSPITAL ENCOUNTER (OUTPATIENT)
Facility: HOSPITAL | Age: 75
Setting detail: INFUSION SERIES
Discharge: HOME | End: 2025-09-03
Payer: MEDICARE

## 2025-09-03 ENCOUNTER — CLINICAL DOCUMENTATION (OUTPATIENT)
Age: 75
End: 2025-09-03

## 2025-09-03 VITALS
OXYGEN SATURATION: 96 % | DIASTOLIC BLOOD PRESSURE: 64 MMHG | SYSTOLIC BLOOD PRESSURE: 118 MMHG | HEART RATE: 88 BPM | TEMPERATURE: 97.8 F | RESPIRATION RATE: 18 BRPM

## 2025-09-03 VITALS
TEMPERATURE: 97.8 F | SYSTOLIC BLOOD PRESSURE: 118 MMHG | OXYGEN SATURATION: 96 % | WEIGHT: 159 LBS | BODY MASS INDEX: 26.46 KG/M2 | HEART RATE: 88 BPM | DIASTOLIC BLOOD PRESSURE: 64 MMHG | RESPIRATION RATE: 18 BRPM

## 2025-09-03 DIAGNOSIS — C61 MALIGNANT NEOPLASM OF PROSTATE (HCC): Primary | ICD-10-CM

## 2025-09-03 DIAGNOSIS — G70.00 MYASTHENIA GRAVIS (HCC): ICD-10-CM

## 2025-09-03 DIAGNOSIS — C79.40 MALIGNANT NEOPLASM OF PROSTATE METASTATIC TO CENTRAL NERVOUS SYSTEM (HCC): Primary | ICD-10-CM

## 2025-09-03 DIAGNOSIS — C61 MALIGNANT NEOPLASM OF PROSTATE METASTATIC TO CENTRAL NERVOUS SYSTEM (HCC): Primary | ICD-10-CM

## 2025-09-03 DIAGNOSIS — C79.31 METASTASIS TO BRAIN (HCC): ICD-10-CM

## 2025-09-03 LAB — PSA SERPL-MCNC: 3.1 NG/ML (ref 0–4.4)

## 2025-09-03 PROCEDURE — 3017F COLORECTAL CA SCREEN DOC REV: CPT | Performed by: INTERNAL MEDICINE

## 2025-09-03 PROCEDURE — 1126F AMNT PAIN NOTED NONE PRSNT: CPT | Performed by: INTERNAL MEDICINE

## 2025-09-03 PROCEDURE — 6360000002 HC RX W HCPCS

## 2025-09-03 PROCEDURE — G8417 CALC BMI ABV UP PARAM F/U: HCPCS | Performed by: INTERNAL MEDICINE

## 2025-09-03 PROCEDURE — 1159F MED LIST DOCD IN RCRD: CPT | Performed by: INTERNAL MEDICINE

## 2025-09-03 PROCEDURE — 99215 OFFICE O/P EST HI 40 MIN: CPT | Performed by: INTERNAL MEDICINE

## 2025-09-03 PROCEDURE — 96402 CHEMO HORMON ANTINEOPL SQ/IM: CPT

## 2025-09-03 PROCEDURE — 84153 ASSAY OF PSA TOTAL: CPT

## 2025-09-03 PROCEDURE — 1123F ACP DISCUSS/DSCN MKR DOCD: CPT | Performed by: INTERNAL MEDICINE

## 2025-09-03 PROCEDURE — G8428 CUR MEDS NOT DOCUMENT: HCPCS | Performed by: INTERNAL MEDICINE

## 2025-09-03 PROCEDURE — 1111F DSCHRG MED/CURRENT MED MERGE: CPT | Performed by: INTERNAL MEDICINE

## 2025-09-03 PROCEDURE — 1036F TOBACCO NON-USER: CPT | Performed by: INTERNAL MEDICINE

## 2025-09-03 RX ORDER — ALBUTEROL SULFATE 90 UG/1
4 INHALANT RESPIRATORY (INHALATION) PRN
OUTPATIENT
Start: 2025-11-23

## 2025-09-03 RX ORDER — EPINEPHRINE 1 MG/ML
0.3 INJECTION, SOLUTION INTRAMUSCULAR; SUBCUTANEOUS PRN
OUTPATIENT
Start: 2025-11-23

## 2025-09-03 RX ORDER — PROCHLORPERAZINE MALEATE 5 MG/1
5 TABLET ORAL EVERY 6 HOURS PRN
Qty: 30 TABLET | Refills: 1 | Status: SHIPPED | OUTPATIENT
Start: 2025-09-03

## 2025-09-03 RX ORDER — ONDANSETRON 2 MG/ML
8 INJECTION INTRAMUSCULAR; INTRAVENOUS
OUTPATIENT
Start: 2025-11-23

## 2025-09-03 RX ORDER — ACETAMINOPHEN 325 MG/1
650 TABLET ORAL
OUTPATIENT
Start: 2025-11-23

## 2025-09-03 RX ORDER — DIPHENHYDRAMINE HYDROCHLORIDE 50 MG/ML
50 INJECTION, SOLUTION INTRAMUSCULAR; INTRAVENOUS
OUTPATIENT
Start: 2025-11-23

## 2025-09-03 RX ORDER — DEXAMETHASONE 4 MG/1
8 TABLET ORAL 2 TIMES DAILY WITH MEALS
Qty: 32 TABLET | Refills: 0 | Status: SHIPPED | OUTPATIENT
Start: 2025-09-03

## 2025-09-03 RX ORDER — ONDANSETRON 8 MG/1
8 TABLET, ORALLY DISINTEGRATING ORAL EVERY 8 HOURS PRN
Qty: 30 TABLET | Refills: 2 | Status: SHIPPED | OUTPATIENT
Start: 2025-09-03

## 2025-09-03 RX ORDER — HYDROCORTISONE SODIUM SUCCINATE 100 MG/2ML
100 INJECTION INTRAMUSCULAR; INTRAVENOUS
OUTPATIENT
Start: 2025-11-23

## 2025-09-03 RX ORDER — SODIUM CHLORIDE 9 MG/ML
INJECTION, SOLUTION INTRAVENOUS PRN
OUTPATIENT
Start: 2025-11-23

## 2025-09-03 RX ORDER — LIDOCAINE AND PRILOCAINE 25; 25 MG/G; MG/G
CREAM TOPICAL
Qty: 30 G | Refills: 0 | Status: SHIPPED | OUTPATIENT
Start: 2025-09-03

## 2025-09-03 RX ADMIN — LEUPROLIDE ACETATE 22.5 MG: KIT at 12:30

## 2025-09-03 ASSESSMENT — PAIN SCALES - GENERAL: PAINLEVEL_OUTOF10: 0

## (undated) DEVICE — SOLUTION IRRIG 1000ML H2O PIC PLAS SHATTERPROOF CONTAINER

## (undated) DEVICE — SOLUTION IRRIGATION STRL H2O 1000 ML UROMATIC CONTAINER

## (undated) DEVICE — SUTURE VICRYL SZ 0 L18IN ABSRB VLT L36MM CT-1 1/2 CIR J740D

## (undated) DEVICE — SOLUTION IV 1000 ML 0.9 NACL INJ USP EXCEL PLAS CONTAINER

## (undated) DEVICE — STOCKINETTE ORTH W6XL54IN COT 2 PLY HLLW FOR HANDLING LMB

## (undated) DEVICE — BUR SURG BALL 5 MM 9 CM FLUT LG BOR MIDAS REX 8

## (undated) DEVICE — Device

## (undated) DEVICE — CATHETER URET 5FR L70CM 0.038IN OPN END FOR DRNGE RG

## (undated) DEVICE — BOSTON SCIENTIFIC

## (undated) DEVICE — MARKER SKIN XR REFLCT LF SPHR DISP

## (undated) DEVICE — SPONGE GZ W4XL4IN 100% COT 16 PLY RADPQ HIGHLY ABSRB

## (undated) DEVICE — STENT URET 6FR L26CM PERCFLX HYDR+ TAPR TIP GRAD: Type: IMPLANTABLE DEVICE | Site: URETER | Status: NON-FUNCTIONAL

## (undated) DEVICE — SUTURE VICRYL RAPIDE SZ 3-0 L18IN ABSRB UD PS-2 L19MM 3/8 CIR VR497

## (undated) DEVICE — COVER LT HNDL PLAS RIG 1 PER PK

## (undated) DEVICE — SYRINGE MED 10ML LUERLOCK TIP W/O SFTY DISP

## (undated) DEVICE — SOLUTION SURG PREP 26 CC PURPREP

## (undated) DEVICE — SURGIFOAM SPNG SZ 100

## (undated) DEVICE — DRAPE UNDERBUTTOCK W40XL44IN POLYPR STD SFT W/ GRAD PCH PRT

## (undated) DEVICE — SET IRRIG L81IN DIA0.195IN UROLOGY Y TYP 2 LD W/ DRIP CHMBR

## (undated) DEVICE — DRAPE FLD WRM W44XL66IN C6L FOR INTRATEMP SYS THERMABASIN

## (undated) DEVICE — AGENT HEMOSTATIC SURG ORIGINAL ABS 2X14IN LOOSE KNIT 12/CA

## (undated) DEVICE — GLOVE ORANGE PI 7 1/2   MSG9075

## (undated) DEVICE — SET IRRIG L81IN 10GTT STR TYP REG CLMP DEHP NONPYROGENIC

## (undated) DEVICE — SUTURE VICRYL + SZ 2-0 L18IN ABSRB UD CP-2 L26MM 1/2 CIR REV VCP762D

## (undated) DEVICE — SOL IRR SOD CHL 0.9% TITAN XL CNTNR 3000ML

## (undated) DEVICE — SOLUTION IV 250ML 0.9% SOD CHL CLR INJ FLX BG CONT PRT CLSR

## (undated) DEVICE — SEALANT HEMOSTATIC FAST PREP 10.75X5.75X13.25 IN 10 CC

## (undated) DEVICE — SUTURE NRLN SZ 4-0 L18IN NONABSORBABLE BLK L13MM TF 1/2 CIR C584D